# Patient Record
Sex: MALE | Race: OTHER | HISPANIC OR LATINO | Employment: FULL TIME | URBAN - METROPOLITAN AREA
[De-identification: names, ages, dates, MRNs, and addresses within clinical notes are randomized per-mention and may not be internally consistent; named-entity substitution may affect disease eponyms.]

---

## 2019-12-12 ENCOUNTER — OFFICE VISIT (OUTPATIENT)
Dept: FAMILY MEDICINE | Facility: CLINIC | Age: 44
End: 2019-12-12
Payer: COMMERCIAL

## 2019-12-12 VITALS
BODY MASS INDEX: 34.59 KG/M2 | WEIGHT: 261 LBS | OXYGEN SATURATION: 97 % | TEMPERATURE: 98 F | HEART RATE: 66 BPM | DIASTOLIC BLOOD PRESSURE: 90 MMHG | HEIGHT: 73 IN | SYSTOLIC BLOOD PRESSURE: 139 MMHG

## 2019-12-12 DIAGNOSIS — I10 ESSENTIAL HYPERTENSION: ICD-10-CM

## 2019-12-12 DIAGNOSIS — E05.90 HYPERTHYROIDISM: Primary | ICD-10-CM

## 2019-12-12 DIAGNOSIS — Z13.6 ENCOUNTER FOR LIPID SCREENING FOR CARDIOVASCULAR DISEASE: ICD-10-CM

## 2019-12-12 DIAGNOSIS — Z13.220 ENCOUNTER FOR LIPID SCREENING FOR CARDIOVASCULAR DISEASE: ICD-10-CM

## 2019-12-12 DIAGNOSIS — F32.A DEPRESSION, UNSPECIFIED DEPRESSION TYPE: ICD-10-CM

## 2019-12-12 DIAGNOSIS — K43.9 HERNIA OF ABDOMINAL WALL: ICD-10-CM

## 2019-12-12 DIAGNOSIS — M19.90 ARTHRITIS: ICD-10-CM

## 2019-12-12 DIAGNOSIS — M10.9 GOUT, UNSPECIFIED CAUSE, UNSPECIFIED CHRONICITY, UNSPECIFIED SITE: ICD-10-CM

## 2019-12-12 DIAGNOSIS — L60.0 INGROWN TOENAIL OF LEFT FOOT WITH INFECTION: ICD-10-CM

## 2019-12-12 PROCEDURE — 3008F PR BODY MASS INDEX (BMI) DOCUMENTED: ICD-10-PCS | Mod: S$GLB,,, | Performed by: NURSE PRACTITIONER

## 2019-12-12 PROCEDURE — 99203 OFFICE O/P NEW LOW 30 MIN: CPT | Mod: S$GLB,,, | Performed by: NURSE PRACTITIONER

## 2019-12-12 PROCEDURE — 99203 PR OFFICE/OUTPT VISIT, NEW, LEVL III, 30-44 MIN: ICD-10-PCS | Mod: S$GLB,,, | Performed by: NURSE PRACTITIONER

## 2019-12-12 PROCEDURE — 3008F BODY MASS INDEX DOCD: CPT | Mod: S$GLB,,, | Performed by: NURSE PRACTITIONER

## 2019-12-12 RX ORDER — LOSARTAN POTASSIUM 50 MG/1
50 TABLET ORAL DAILY
Qty: 30 TABLET | Refills: 1 | Status: SHIPPED | OUTPATIENT
Start: 2019-12-12 | End: 2020-04-13

## 2019-12-12 RX ORDER — INDOMETHACIN 50 MG/1
50 CAPSULE ORAL 2 TIMES DAILY
COMMUNITY
Start: 2016-02-24 | End: 2019-12-12 | Stop reason: SDUPTHER

## 2019-12-12 RX ORDER — INDOMETHACIN 50 MG/1
50 CAPSULE ORAL DAILY
Qty: 30 CAPSULE | Refills: 1 | Status: SHIPPED | OUTPATIENT
Start: 2019-12-12 | End: 2020-01-30 | Stop reason: SDUPTHER

## 2019-12-12 RX ORDER — FEBUXOSTAT 40 MG/1
40 TABLET, FILM COATED ORAL DAILY
Qty: 30 TABLET | Refills: 1 | Status: SHIPPED | OUTPATIENT
Start: 2019-12-12 | End: 2019-12-27

## 2019-12-12 RX ORDER — BACITRACIN 500 [USP'U]/G
OINTMENT TOPICAL 2 TIMES DAILY
Qty: 14 G | Refills: 0 | Status: SHIPPED | OUTPATIENT
Start: 2019-12-12 | End: 2020-01-30

## 2019-12-12 RX ORDER — LOSARTAN POTASSIUM 50 MG/1
50 TABLET ORAL DAILY
COMMUNITY
End: 2019-12-12 | Stop reason: SDUPTHER

## 2019-12-12 RX ORDER — ESCITALOPRAM OXALATE 10 MG/1
10 TABLET ORAL DAILY
Qty: 30 TABLET | Refills: 1 | Status: SHIPPED | OUTPATIENT
Start: 2019-12-12 | End: 2020-01-30 | Stop reason: SDUPTHER

## 2019-12-12 RX ORDER — IBUPROFEN 800 MG/1
800 TABLET ORAL EVERY 4 HOURS PRN
COMMUNITY
End: 2019-12-12 | Stop reason: SDUPTHER

## 2019-12-12 RX ORDER — METHIMAZOLE 10 MG/1
10 TABLET ORAL DAILY
Qty: 30 TABLET | Refills: 1 | Status: SHIPPED | OUTPATIENT
Start: 2019-12-12 | End: 2020-04-13

## 2019-12-12 RX ORDER — IBUPROFEN 800 MG/1
800 TABLET ORAL 3 TIMES DAILY PRN
Qty: 60 TABLET | Refills: 0 | Status: SHIPPED | OUTPATIENT
Start: 2019-12-12 | End: 2020-02-24 | Stop reason: ALTCHOICE

## 2019-12-12 RX ORDER — FEBUXOSTAT 40 MG/1
40 TABLET, FILM COATED ORAL DAILY
COMMUNITY
End: 2019-12-12 | Stop reason: SDUPTHER

## 2019-12-12 RX ORDER — SULFAMETHOXAZOLE AND TRIMETHOPRIM 800; 160 MG/1; MG/1
1 TABLET ORAL 2 TIMES DAILY
Qty: 10 TABLET | Refills: 0 | Status: SHIPPED | OUTPATIENT
Start: 2019-12-12 | End: 2019-12-17

## 2019-12-12 RX ORDER — METHIMAZOLE 10 MG/1
10 TABLET ORAL DAILY
COMMUNITY
End: 2019-12-12 | Stop reason: SDUPTHER

## 2019-12-12 NOTE — PATIENT INSTRUCTIONS
Gout Diet  Gout is a painful condition caused by an excess of uric acid, a waste product made by the body. Uric acid forms crystals that collect in the joints. The immune response to these crystals brings on symptoms of joint pain and swelling. This is called a gout attack. Often, medications and diet changes are combined to manage gout. Below are some guidelines for changing your diet to help you manage gout and prevent attacks. Your health care provider will help you determine the best eating plan for you.     Eating to manage gout  Weight loss for those who are overweight may help reduce gout attacks.  Eat less of these foods  Eating too many foods containing purines may raise the levels of uric acid in your body. This raises your risk for a gout attack. Try to limit these foods and drinks:  · Alcohol, such as beer and red wine. You may be told to avoid alcohol completely.  · Soft drinks that contain sugar or high fructose corn syrup  · Certain fish, including anchovies, sardines, fish eggs, and herring  · Shellfish  · Certain meats, such as red meat, hot dogs, luncheon meats, and turkey  · Organ meats, such as liver, kidneys, and sweetbreads  · Legumes, such as dried beans and peas  · Other high fat foods such as gravy, whole milk, and high fat cheeses  · Vegetables such as asparagus, cauliflower, spinach, and mushrooms used to be thought to contribute to an increased risk for a gout attack, but recent studies show that high purine vegetables don't increase the risk for a gout attack.  Eat more of these foods  Other foods may be helpful for people with gout. Add some of these foods to your diet:  · Cherries contain chemicals that may lower uric acid.  · Omega fatty acids. These are found in some fatty fish such as salmon, certain oils (flax, olive, or nut), and nuts themselves. Omega fatty acids may help prevent inflammation due to gout.  · Dairy products that are low-fat or fat-free, such as cheese and  yogurt  · Complex carbohydrate foods, including whole grains, brown rice, oats, and beans  · Coffee, in moderation  · Water, approximately 64 ounces per day  Follow-up care  Follow up with your healthcare provider as advised.  When to seek medical advice  Call your healthcare provider right away if any of these occur:  · Return of gout symptoms, usually at night:  · Severe pain, swelling, and heat in a joint, especially the base of the big toe  · Affected joint is hard to move  · Skin of the affected joint is purple or red  · Fever of 100.4°F (38°C) or higher  · Pain that doesn't get better even with prescribed medicine   Date Last Reviewed: 1/12/2016  © 4114-8761 [employer]. [address] [place] [address], [place] [address]. All rights reserved. This information is not intended as a substitute for professional medical care. Always follow your healthcare professional's instructions.      Depression  Depression is one of the most common mental health problems today. It is not just a state of unhappiness or sadness. It is a true disease. The cause seems to be related to a decrease in chemicals that transmit signals in the brain. Having a family history of depression, alcoholism, or suicide increases the risk. Chronic illness, chronic pain, migraine headaches and high emotional stress also increase the risk.  Depression is something we tend to recognize in others, but may have a hard time seeing in ourselves. It can show in many physical and emotional ways:  · Loss of appetite  · Over-eating  · Not being able to sleep  · Sleeping too much  · Tiredness not related to physical exertion  · Restlessness or irritability  · Slowness of movement or speech  · Feeling depressed or withdrawn  · Loss of interest in things you once enjoyed  · Trouble concentrating, poor memory, trouble making decisions  · Thoughts of harming or killing oneself, or thoughts that life is not worth living  · Low self-esteem  The treatment for  depression may include both medicine and psychotherapy. Antidepressants can reduce suffering and can improve the ability to function during the depressed period. Therapy can offer emotional support and help you understand emotional factors that may be causing the depression.  Home care  · On-going care and support helps people manage this disease.  Find a healthcare provider and therapist who meet your needs. Seek help when you feel like you may be getting ill.  · Be kind to yourself. Make it a point to do things that you enjoy (gardening, walking in nature, going to a movie, etc.). Reward yourself for small successes.  · Take care of your physical body. Eat a balanced diet (low in saturated fat and high in fruits and vegetables). Exercise at least 3 times a week for 30 minutes. Even mild-moderate exercise (like brisk walking) can make you feel better.  · Avoid alcohol, which can make depression worse.  · Take medicine as prescribed.  · Tell each of your healthcare providers about all of the prescription drugs, over-the-counter medicines, vitamins, and supplements you take. Certain supplements interact with medicines and can result in dangerous side effects. Ask your pharmacist when you have questions about drug interactions.  · Talk with your family and trusted friends about your feelings and thoughts. Ask them to help you recognize behavior changes early so you can get help and, if needed, medicine can be adjusted.  Follow-up care  Follow up with your healthcare provider, or as advised.  Call 911  Call 911 if you:  · Have suicidal thoughts, a suicide plan, and the means to carry out the plan  · Have trouble breathing  · Are very confused  · Feel very drowsy or have trouble awakening  · Faint or lose consciousness  · Have new chest pain that becomes more severe, lasts longer, or spreads into your shoulder, arm, neck, jaw or back  When to seek medical advice  Call your healthcare provider right away if any of these  occur:  · Feeling extreme depression, fear, anxiety, or anger toward yourself or others  · Feeling out of control  · Feeling that you may try to harm yourself or another  · Hearing voices that others do not hear  · Seeing things that others do not see  · Cant sleep or eat for 3 days in a row  · Friends or family express concern over your behavior and ask you to seek help  Date Last Reviewed: 9/29/2015  © 9950-2502 [employer]. [address] [place] [address], [place] [address]. All rights reserved. This information is not intended as a substitute for professional medical care. Always follow your healthcare professional's instructions.

## 2019-12-12 NOTE — PROGRESS NOTES
SUBJECTIVE:      Patient ID: Jose Fox is a 44 y.o. male.    Chief Complaint: Establish Care    Pt presents as a new patient to establish care with the practice but wants to see Dr. Duran. He has a PMH of hypertension, gout, arthritis, and hyperthyroidism. He has not seen a doctor since moving to the area in February of this year. He has a history of gout but isn't compliant with a gout diet. He is a  at the [employer]. He ran out of his blood pressure medicine so didn't take it this morning. He used to follow with an endocrinologist with his history of hyperthyroidism but hasn't been established with an endocrinologist since moving here. Reports ingrown toenail which he pulled away from the skin on his own and has been using Dr. Guillaume's ingrown toenail relief cream on it. Reports a lump on his abdomen which is sometimes slightly tender and feels it protrude more in different positions. He also reports some depression since moving to the area in February. Reports in the past he has been treated for this successfully with Lexapro daily but he stopped a couple years back. Otherwise doing well with no complaints. Denies abdominal pain, suicidal ideations or plan, CP, SOB, wheezing, nausea, vomiting, dizziness, numbness, weakness, palpitations, fevers, or any other concerns at this time.   Depression   Visit Type: initial  Onset of symptoms: 1 to 6 months ago  Progression since onset: unchanged  Patient presents with the following symptoms: anhedonia, depressed mood and malaise.  Patient is not experiencing: chest pain, choking sensation, compulsions, confusion, decreased concentration, dizziness, dry mouth, excessive worry, fatigue, feelings of hopelessness, feelings of worthlessness, hypersomnia, hyperventilation, impotence, insomnia, irritability, memory impairment, muscle tension, nausea, nervousness/anxiety, obsessions, palpitations, panic, psychomotor agitation, psychomotor retardation,  restlessness, shortness of breath, suicidal ideas, suicidal planning, thoughts of death, weight gain and weight loss.  Frequency of symptoms: most days   Severity: moderate   Exacerbated by: move to the area.  Risk factors: major life event  Patient has a history of: depression and hyperthyroidism  No history of: anemia, anxiety/panic attacks, arrhythmia, asthma, bipolar disorder, CAD, CHF, chronic lung disease, fibromyalgia, suicide attempt, mental illness and substance abuse  Treatment tried: SSRI      Foot Injury    The incident occurred at home. There was no injury mechanism. Pain location: L great toe. Pertinent negatives include no inability to bear weight, loss of motion, loss of sensation, muscle weakness, numbness or tingling. He reports no foreign bodies present. The symptoms are aggravated by palpation. Treatments tried: Dr. Guillaume's ingrown toenail cream. The treatment provided no relief.       Past Surgical History:   Procedure Laterality Date    LEG SURGERY       Family History   Problem Relation Age of Onset    Hypertension Mother     Hypertension Father     Diabetes Father       Social History     Socioeconomic History    Marital status: Single     Spouse name: Not on file    Number of children: Not on file    Years of education: Not on file    Highest education level: Not on file   Occupational History    Not on file   Social Needs    Financial resource strain: Not on file    Food insecurity:     Worry: Not on file     Inability: Not on file    Transportation needs:     Medical: Not on file     Non-medical: Not on file   Tobacco Use    Smoking status: Never Smoker    Smokeless tobacco: Never Used   Substance and Sexual Activity    Alcohol use: Never     Frequency: Never    Drug use: Never    Sexual activity: Not on file   Lifestyle    Physical activity:     Days per week: Not on file     Minutes per session: Not on file    Stress: Only a little   Relationships    Social  connections:     Talks on phone: Not on file     Gets together: Not on file     Attends Bahai service: Not on file     Active member of club or organization: Not on file     Attends meetings of clubs or organizations: Not on file     Relationship status: Not on file   Other Topics Concern    Not on file   Social History Narrative    Not on file     Current Outpatient Medications   Medication Sig Dispense Refill    febuxostat (ULORIC) 40 mg Tab Take 1 tablet (40 mg total) by mouth once daily. 30 tablet 1    ibuprofen (ADVIL,MOTRIN) 800 MG tablet Take 1 tablet (800 mg total) by mouth 3 (three) times daily as needed for Pain. With meals 60 tablet 0    indomethacin (INDOCIN) 50 MG capsule Take 1 capsule (50 mg total) by mouth once daily. With meal 30 capsule 1    losartan (COZAAR) 50 MG tablet Take 1 tablet (50 mg total) by mouth once daily. 30 tablet 1    methIMAzole (TAPAZOLE) 10 MG Tab Take 1 tablet (10 mg total) by mouth once daily. 30 tablet 1    bacitracin 500 unit/gram ointment Apply topically 2 (two) times daily. 14 g 0    escitalopram oxalate (LEXAPRO) 10 MG tablet Take 1 tablet (10 mg total) by mouth once daily. 30 tablet 1    sulfamethoxazole-trimethoprim 800-160mg (BACTRIM DS) 800-160 mg Tab Take 1 tablet by mouth 2 (two) times daily. for 5 days 10 tablet 0     No current facility-administered medications for this visit.      Review of patient's allergies indicates:  No Known Allergies   Past Medical History:   Diagnosis Date    Gout     Hypertension     Thyroid disease      Past Surgical History:   Procedure Laterality Date    LEG SURGERY         Review of Systems   Constitutional: Negative for activity change, appetite change, chills, fatigue, fever, irritability, unexpected weight change, weight gain and weight loss.   HENT: Negative for congestion, ear pain, mouth sores, nosebleeds, rhinorrhea, sinus pressure, sore throat and trouble swallowing.    Eyes: Negative for pain and visual  "disturbance.   Respiratory: Negative for apnea, cough, choking, chest tightness, shortness of breath, wheezing and stridor.    Cardiovascular: Negative for chest pain, palpitations and leg swelling.   Gastrointestinal: Negative for abdominal pain, anal bleeding, blood in stool, constipation, diarrhea, nausea and vomiting.        Lump on abdomen   Genitourinary: Negative for dysuria, flank pain, frequency, hematuria and impotence.   Musculoskeletal: Negative for arthralgias, myalgias and neck stiffness.   Skin: Positive for wound. Negative for color change and rash.   Neurological: Negative for dizziness, tingling, tremors, seizures, syncope, numbness and headaches.   Hematological: Negative for adenopathy.   Psychiatric/Behavioral: Positive for depression. Negative for confusion, decreased concentration, self-injury, sleep disturbance, substance abuse and suicidal ideas. The patient is not nervous/anxious and does not have insomnia.         Depression      OBJECTIVE:      Vitals:    12/12/19 0835 12/12/19 0937   BP: (!) 138/92 (!) 139/90   BP Location:  Left arm   Patient Position:  Sitting   Pulse: 66    Temp: 97.5 °F (36.4 °C)    SpO2: 97%    Weight: 118.4 kg (261 lb)    Height: 6' 1" (1.854 m)      Physical Exam   Constitutional: He is oriented to person, place, and time. He appears well-developed and well-nourished. No distress.   HENT:   Head: Normocephalic and atraumatic.   Right Ear: Hearing, tympanic membrane, external ear and ear canal normal.   Left Ear: Hearing, tympanic membrane, external ear and ear canal normal.   Nose: Nose normal. No mucosal edema or rhinorrhea.   Mouth/Throat: Uvula is midline, oropharynx is clear and moist and mucous membranes are normal. No oropharyngeal exudate, posterior oropharyngeal edema or posterior oropharyngeal erythema.   Eyes: Pupils are equal, round, and reactive to light. Conjunctivae, EOM and lids are normal. Right eye exhibits no discharge. Left eye exhibits no " discharge. No scleral icterus.   Neck: Trachea normal, normal range of motion, full passive range of motion without pain and phonation normal. Neck supple. No tracheal deviation present. No thyromegaly present.   Cardiovascular: Normal rate, regular rhythm, normal heart sounds, intact distal pulses and normal pulses. Exam reveals no gallop and no friction rub.   No murmur heard.  Pulmonary/Chest: Effort normal and breath sounds normal. No stridor. No respiratory distress. He has no decreased breath sounds. He has no wheezes. He has no rhonchi. He has no rales.   Abdominal: Soft. Normal appearance and bowel sounds are normal. He exhibits no distension and no mass. There is no hepatosplenomegaly. There is no tenderness. There is no rigidity and no guarding. A hernia is present.       Musculoskeletal: Normal range of motion. He exhibits no edema.   Feet:   Left Foot:   Skin Integrity: Positive for erythema and warmth.   Lymphadenopathy:     He has no cervical adenopathy.        Right: No supraclavicular adenopathy present.        Left: No supraclavicular adenopathy present.   Neurological: He is alert and oriented to person, place, and time.   Skin: Skin is warm, dry and intact. Capillary refill takes less than 2 seconds. No rash noted. He is not diaphoretic.   Warmth, redness and drainage noted to great L toe, open areas noted where pt pulled the toenail from the skin   Psychiatric: He has a normal mood and affect. His speech is normal and behavior is normal. Judgment and thought content normal. Cognition and memory are normal. He expresses no suicidal plans.   Vitals reviewed.     Assessment:       1. Hyperthyroidism    2. Essential hypertension    3. Ingrown toenail of left foot with infection    4. Depression, unspecified depression type    5. Gout, unspecified cause, unspecified chronicity, unspecified site    6. Arthritis    7. Encounter for lipid screening for cardiovascular disease    8. Hernia of abdominal  wall        Plan:       Hyperthyroidism  Will send to endocrinology for further follow up since he was seeing endocrinology prior to moving here. Checking thyroid panel for levels. Refills given for methimazole as he was previously taking.  -     Ambulatory referral/consult to Endocrinology; Future; Expected date: 12/12/2019  -     TSH; Future; Expected date: 12/12/2019  -     T4, free; Future; Expected date: 12/12/2019  -     T3, free; Future; Expected date: 12/12/2019  -     methIMAzole (TAPAZOLE) 10 MG Tab; Take 1 tablet (10 mg total) by mouth once daily.  Dispense: 30 tablet; Refill: 1    Essential hypertension  BP is borderline at this time, however, pt reports he did not take his medication this morning yet. Will continue him on losartan as previously prescribed. Checking labs. Will call with results. F/U with Dr. Duran in 4 weeks to assess BP control with compliance.  -     CBC auto differential; Future; Expected date: 12/12/2019  -     losartan (COZAAR) 50 MG tablet; Take 1 tablet (50 mg total) by mouth once daily.  Dispense: 30 tablet; Refill: 1    Ingrown toenail of left foot with infection  Referring to podiatry for further treatment. Treating with Bactrim x 5 days for purulent skin infection. Bacitracin twice daily with instructions to keep covered when in shoes or socks. Can remove bandage overnight. F/U if symptoms worsen or don't improve.  -     Ambulatory referral to Podiatry  -     bacitracin 500 unit/gram ointment; Apply topically 2 (two) times daily.  Dispense: 14 g; Refill: 0  -     sulfamethoxazole-trimethoprim 800-160mg (BACTRIM DS) 800-160 mg Tab; Take 1 tablet by mouth 2 (two) times daily. for 5 days  Dispense: 10 tablet; Refill: 0    Depression, unspecified depression type  Starting on 10mg Lexapro to initiate dosage again and assess for control of depression. F/U in 4 weeks with Dr. Duran.  -     escitalopram oxalate (LEXAPRO) 10 MG tablet; Take 1 tablet (10 mg total) by mouth once daily.   Dispense: 30 tablet; Refill: 1    Gout, unspecified cause, unspecified chronicity, unspecified site  Checking labs and will call with results. Refilling medications as previously taken. Reports he was started on Uloric because allopurinol wasn't effective for him. Instructed to follow gout diet.  -     Comprehensive metabolic panel; Future; Expected date: 12/12/2019  -     Uric acid; Future; Expected date: 12/12/2019  -     febuxostat (ULORIC) 40 mg Tab; Take 1 tablet (40 mg total) by mouth once daily.  Dispense: 30 tablet; Refill: 1  -     indomethacin (INDOCIN) 50 MG capsule; Take 1 capsule (50 mg total) by mouth once daily. With meal  Dispense: 30 capsule; Refill: 1    Arthritis  Refilled his ibuprofen which he takes for arthritic type pain. Instructed he should not be taking the indomethacin and ibuprofen at the same time. Verbalized understanding.  -     ibuprofen (ADVIL,MOTRIN) 800 MG tablet; Take 1 tablet (800 mg total) by mouth 3 (three) times daily as needed for Pain. With meals  Dispense: 60 tablet; Refill: 0    Encounter for lipid screening for cardiovascular disease  -     Lipid panel; Future; Expected date: 12/12/2019    Hernia of abdominal wall  Pt has a hernia of his abdominal wall just superior to his umbilicus. Sending referral to general surgery for their recommendations.  -     Ambulatory Referral to General Surgery        Follow up in about 4 weeks (around 1/9/2020) for F/U depression with Dr. Duran.      12/12/2019 Kennedi Horn, LEENA, FNP

## 2019-12-23 ENCOUNTER — TELEPHONE (OUTPATIENT)
Dept: FAMILY MEDICINE | Facility: CLINIC | Age: 44
End: 2019-12-23

## 2019-12-23 NOTE — TELEPHONE ENCOUNTER
----- Message from Miriam Bey, Patient Care Assistant sent at 12/23/2019 10:26 AM CST -----  Regarding: Referral Order  Contact: [phone]  Referral: for pt to see Dr. Rouse    Status: unable to contact pt    We attempted to call this patient four times to schedule an appointment for this referral to no avail. Please be advised, we will be removing this from our Queue.    Thank You,  Miriam

## 2019-12-27 ENCOUNTER — OFFICE VISIT (OUTPATIENT)
Dept: FAMILY MEDICINE | Facility: CLINIC | Age: 44
End: 2019-12-27
Payer: COMMERCIAL

## 2019-12-27 VITALS
WEIGHT: 258.88 LBS | SYSTOLIC BLOOD PRESSURE: 134 MMHG | DIASTOLIC BLOOD PRESSURE: 72 MMHG | HEIGHT: 73 IN | OXYGEN SATURATION: 98 % | BODY MASS INDEX: 34.31 KG/M2 | HEART RATE: 78 BPM | TEMPERATURE: 98 F

## 2019-12-27 DIAGNOSIS — M62.830 SPASM OF LUMBAR PARASPINOUS MUSCLE: ICD-10-CM

## 2019-12-27 DIAGNOSIS — M54.41 ACUTE RIGHT-SIDED LOW BACK PAIN WITH RIGHT-SIDED SCIATICA: Primary | ICD-10-CM

## 2019-12-27 PROCEDURE — 96372 THER/PROPH/DIAG INJ SC/IM: CPT | Mod: S$GLB,,, | Performed by: NURSE PRACTITIONER

## 2019-12-27 PROCEDURE — 3008F BODY MASS INDEX DOCD: CPT | Mod: S$GLB,,, | Performed by: NURSE PRACTITIONER

## 2019-12-27 PROCEDURE — 3008F PR BODY MASS INDEX (BMI) DOCUMENTED: ICD-10-PCS | Mod: S$GLB,,, | Performed by: NURSE PRACTITIONER

## 2019-12-27 PROCEDURE — 96372 PR INJECTION,THERAP/PROPH/DIAG2ST, IM OR SUBCUT: ICD-10-PCS | Mod: S$GLB,,, | Performed by: NURSE PRACTITIONER

## 2019-12-27 PROCEDURE — 99213 OFFICE O/P EST LOW 20 MIN: CPT | Mod: 25,S$GLB,, | Performed by: NURSE PRACTITIONER

## 2019-12-27 PROCEDURE — 99213 PR OFFICE/OUTPT VISIT, EST, LEVL III, 20-29 MIN: ICD-10-PCS | Mod: 25,S$GLB,, | Performed by: NURSE PRACTITIONER

## 2019-12-27 RX ORDER — METHYLPREDNISOLONE SODIUM SUCCINATE 125 MG/2ML
125 INJECTION INTRAMUSCULAR; INTRAVENOUS ONCE
Status: COMPLETED | OUTPATIENT
Start: 2019-12-27 | End: 2019-12-27

## 2019-12-27 RX ORDER — CYCLOBENZAPRINE HCL 10 MG
10 TABLET ORAL 3 TIMES DAILY PRN
Qty: 30 TABLET | Refills: 0 | Status: SHIPPED | OUTPATIENT
Start: 2019-12-27 | End: 2020-01-06

## 2019-12-27 RX ADMIN — METHYLPREDNISOLONE SODIUM SUCCINATE 125 MG: 125 INJECTION INTRAMUSCULAR; INTRAVENOUS at 01:12

## 2019-12-27 NOTE — PATIENT INSTRUCTIONS
Relieving Back Pain  Back pain is a common problem. You can strain back muscles by lifting too much weight or just by moving the wrong way. Back strain can be uncomfortable, even painful. And it can take weeks or months to improve. To help yourself feel better and prevent future back strains, try these tips.  Important Note: Do not give aspirin to children or teens without first discussing it with your healthcare provider.      ? Ice    Ice reduces muscle pain and swelling. It helps most during the first 24 to 48 hours after an injury.  · Wrap an ice pack or a bag of frozen peas in a thin towel. (Never place ice directly on your skin.)  · Place the ice where your back hurts the most.  · Dont ice for more than 20 minutes at a time.  · You can use ice several times a day.  ? Medicines  Over-the-counter pain relievers can include acetaminophen and anti-inflammatory medicines, which includes aspirin or ibuprofen. They can help ease discomfort. Some also reduce swelling.  · Tell your healthcare provider about any medicines you are already taking.  · Take medicines only as directed.  ? Heat  After the first 48 hours, heat can relax sore muscles and improve blood flow.  · Try a warm bath or shower. Or use a heating pad set on low. To prevent a burn, keep a cloth between you and the heating pad.  · Dont use a heating pad for more than 15 minutes at a time. Never sleep on a heating pad.  Date Last Reviewed: 9/1/2015  © 2693-0030 [employer]. [address] [place] [address]. All rights reserved. This information is not intended as a substitute for professional medical care. Always follow your healthcare professional's instructions.        Back Spasm (No Trauma)    Spasm of the back muscles can occur after a sudden forceful twisting or bending force (such as in a car accident), after a simple awkward movement, or after lifting something heavy with poor body positioning. In any case, muscle spasm  adds to the pain. Sleeping in an awkward position or on a poor quality mattress can also cause this. Some people respond to emotional stress by tensing the muscles of their back.  Pain that continues may need further evaluation or other types of treatment such as physical therapy.  You don't always need X-rays for the initial evaluation of back pain, unless you had a physical injury such as from a car accident or fall. If your pain continues and doesn't respond to medical treatment, X-rays and other tests may then be done.   Home care  · As soon as possible, start sitting or walking again to avoid problems from prolonged bed rest (muscle weakness, worsening back stiffness and pain, blood clots in the legs).  · When in bed, try to find a position of comfort. A firm mattress is best. Try lying flat on your back with pillows under your knees. You can also try lying on your side with your knees bent up toward your chest and a pillow between your knees.  · Avoid prolonged sitting, long car rides, or travel. This puts more stress on the lower back than standing or walking.   · During the first 24 to 72 hours after an injury or flare-up, apply an ice pack to the painful area for 20 minutes, then remove it for 20 minutes. Do this over a period of 60 to 90 minutes or several times a day. This will reduce swelling and pain. Always wrap ice packs in a thin towel.  · You can start with ice, then switch to heat. Heat (hot shower, hot bath, or heating pad) reduces pain, and works well for muscle spasms. Apply heat to the painful area for 20 minutes, then remove it for 20 minutes. Do this over a period of 60 to 90 minutes or several times a day. Do not sleep on a heating pad as it can burn or damage skin.  · Alternate ice and heat therapies.  · Be aware of safe lifting methods and do not lift anything over 15 pounds until all the pain is gone.  Gentle stretching will help your back heal faster. Do this simple routine 2 to 3 times  a day until your back is feeling better.  · Lie on your back with your knees bent and both feet on the ground  · Slowly raise your left knee to your chest as you flatten your lower back against the floor. Hold for 20 to 30 seconds.  · Relax and repeat the exercise with your right knee.  · Do 2 to 3 of these exercises for each leg.  · Repeat, hugging both knees to your chest at the same time.  · Do not bounce, but use a gentle pull.  Medicines  Talk to your doctor before using medicine, especially if you have other medical problems or are taking other medicines.  You may use acetaminophen or ibuprofen to control pain, unless your healthcare provider prescribed another pain medicine. If you have a chronic condition such as diabetes, liver or kidney disease, stomach ulcer, or gastrointestinal bleeding, or are taking blood thinners, talk with your healthcare provider before taking any medicines.  Be careful if you are given prescription pain medicine, narcotics, or medicine for muscle spasm. They can cause drowsiness, affect your coordination, reflexes, or judgment. Do not drive or operate heavy machinery when taking these medicines. Take pain medicine only as prescribed by your healthcare provider.  Follow-up care  Follow up with your doctor, or as advised. Physical therapy or further tests may be needed.  If X-rays were taken, they may be reviewed by a radiologist. You will be notified of any new findings that may affect your care.  Call 911  Seek emergency medical care if any of these occur:  · Trouble breathing  · Confusion  · Drowsiness or trouble awakening  · Fainting or loss of consciousness  · Rapid or very slow heart rate  · Loss of bowel or bladder control  When to seek medical advice  Call your healthcare provider right away if any of these occur:  · Pain becomes worse or spreads to your legs  · Weakness or numbness in one or both legs  · Numbness in the groin or genital area  · Unexplained fever over 100.4ºF  (38.0ºC)  · Burning or pain when passing urine  Date Last Reviewed: 6/1/2016  © 1679-0857 [employer]. [address] [place] [address], [place] [address]. All rights reserved. This information is not intended as a substitute for professional medical care. Always follow your healthcare professional's instructions.        Exercises to Strengthen Your Lower Back  Strong lower back and abdominal muscles work together to support your spine. The exercises below will help strengthen the lower back. It is important that you begin exercising slowly and increase levels gradually.  Always begin any exercise program with stretching. If you feel pain while doing any of these exercises, stop and talk to your doctor about a more specific exercise program that better suits your condition.   Low back stretch  The point of stretching is to make you more flexible and increase your range of motion. Stretch only as much as you are able. Stretch slowly. Do not push your stretch to the limit. If at any point you feel pain while stretching, this is your (temporary) limit.  · Lie on your back with your knees bent and both feet on the ground.  · Slowly raise your left knee to your chest as you flatten your lower back against the floor. Hold for 5 seconds.  · Relax and repeat the exercise with your right knee.  · Do 10 of these exercises for each leg.  · Repeat hugging both knees to your chest at the same time.  Building lower back strength  Start your exercise routine with 10 to 30 minutes a day, 1 to 3 times a day.  Initial exercises  Lying on your back:  1. Ankle pumps: Move your foot up and down, towards your head, and then away. Repeat 10 times with each foot.  2. Heel slides: Slowly bend your knee, drawing the heel of your foot towards you. Then slide your heel/foot from you, straightening your knee. Do not lift your foot off the floor (this is not a leg lift).  3. Abdominal contraction: Bend your knees and put your hands on your  stomach. Tighten your stomach muscles. Hold for 5 seconds, then relax. Repeat 10 times.  4. Straight leg raise: Bend one leg at the knee and keep the other leg straight. Tighten your stomach muscles. Slowly lift your straight leg 6 to 12 inches off the floor and hold for up to 5 seconds. Repeat 10 times on each side.  Standin. Wall squats: Stand with your back against the wall. Move your feet about 12 inches away from the wall. Tighten your stomach muscles, and slowly bend your knees until they are at about a 45 degree angle. Do not go down too far. Hold about 5 seconds. Then slowly return to your starting position. Repeat 10 times.  2. Heel raises: Stand facing the wall. Slowly raise the heels of your feet up and down, while keeping your toes on the floor. If you have trouble balancing, you can touch the wall with your hands. Repeat 10 times.  More advanced exercises  When you feel comfortable enough, try these exercises.  1. Kneeling lumbar extension: Begin on your hands and knees. At the same time, raise and straighten your right arm and left leg until they are parallel to the ground. Hold for 2 seconds and come back slowly to a starting position. Repeat with left arm and right leg, alternating 10 times.  2. Prone lumbar extension: Lie face down, arms extended overhead, palms on the floor. At the same time, raise your right arm and left leg as high as comfortably possible. Hold for 10 seconds and slowly return to start. Repeat with left arm and right leg, alternating 10 times. Gradually build up to 20 times. (Advanced: Repeat this exercise raising both arms and both legs a few inches off the floor at the same time. Hold for 5 seconds and release.)  3. Pelvic tilt: Lie on the floor on your back with your knees bent at 90 degrees. Your feet should be flat on the floor. Inhale, exhale, then slowly contract your abdominal muscles bringing your navel toward your spine. Let your pelvis rock back until your lower  back is flat on the floor. Hold for 10 seconds while breathing smoothly.  4. Abdominal crunch: Perform a pelvic tilt (above) flattening your lower back against the floor. Holding the tension in your abdominal muscles, take another breath and raise your shoulder blades off the ground (this is not a full sit-up). Keep your head in line with your body (dont bend your neck forward). Hold for 2 seconds, then slowly lower.  Date Last Reviewed: 6/1/2016  © 3882-4724 [employer]. [address] [place] [address]. All rights reserved. This information is not intended as a substitute for professional medical care. Always follow your healthcare professional's instructions.

## 2019-12-27 NOTE — PROGRESS NOTES
SUBJECTIVE:      Patient ID: Jose Fox is a 44 y.o. male.    Chief Complaint: Spasms (right lower back x 2 weeks, no known injury)    Established patient here for c/o lower back spasms and sharp pain over the last few weeks. He denies any trauma, falls or injury preceding the pain. He has been taking NSAIDS prn with some improvement.     Back Pain   This is a new problem. The current episode started 1 to 4 weeks ago. The problem occurs intermittently. The problem has been waxing and waning since onset. The pain is present in the lumbar spine (right > left ). The quality of the pain is described as cramping and stabbing. The pain does not radiate. The pain is at a severity of 8/10. The pain is worse during the night. The symptoms are aggravated by bending, standing and twisting. Stiffness is present in the morning. Pertinent negatives include no abdominal pain, bladder incontinence, bowel incontinence, chest pain, dysuria, fever, headaches, leg pain, numbness, paresthesias, pelvic pain, tingling or weakness. Risk factors include lack of exercise and obesity. He has tried NSAIDs (stretching ) for the symptoms. The treatment provided mild relief.       Family History   Problem Relation Age of Onset    Hypertension Mother     Hypertension Father     Diabetes Father       Social History     Socioeconomic History    Marital status: Single     Spouse name: Not on file    Number of children: Not on file    Years of education: Not on file    Highest education level: Not on file   Occupational History    Not on file   Social Needs    Financial resource strain: Not on file    Food insecurity:     Worry: Not on file     Inability: Not on file    Transportation needs:     Medical: Not on file     Non-medical: Not on file   Tobacco Use    Smoking status: Never Smoker    Smokeless tobacco: Never Used   Substance and Sexual Activity    Alcohol use: Never     Frequency: Never    Drug use: Never    Sexual  activity: Not on file   Lifestyle    Physical activity:     Days per week: Not on file     Minutes per session: Not on file    Stress: Only a little   Relationships    Social connections:     Talks on phone: Not on file     Gets together: Not on file     Attends Rastafari service: Not on file     Active member of club or organization: Not on file     Attends meetings of clubs or organizations: Not on file     Relationship status: Not on file   Other Topics Concern    Not on file   Social History Narrative    Not on file     Current Outpatient Medications   Medication Sig Dispense Refill    bacitracin 500 unit/gram ointment Apply topically 2 (two) times daily. 14 g 0    escitalopram oxalate (LEXAPRO) 10 MG tablet Take 1 tablet (10 mg total) by mouth once daily. 30 tablet 1    ibuprofen (ADVIL,MOTRIN) 800 MG tablet Take 1 tablet (800 mg total) by mouth 3 (three) times daily as needed for Pain. With meals 60 tablet 0    indomethacin (INDOCIN) 50 MG capsule Take 1 capsule (50 mg total) by mouth once daily. With meal 30 capsule 1    losartan (COZAAR) 50 MG tablet Take 1 tablet (50 mg total) by mouth once daily. 30 tablet 1    methIMAzole (TAPAZOLE) 10 MG Tab Take 1 tablet (10 mg total) by mouth once daily. 30 tablet 1    cyclobenzaprine (FLEXERIL) 10 MG tablet Take 1 tablet (10 mg total) by mouth 3 (three) times daily as needed for Muscle spasms. 30 tablet 0     No current facility-administered medications for this visit.      Review of patient's allergies indicates:  No Known Allergies   Past Medical History:   Diagnosis Date    Gout     Hypertension     Thyroid disease      Past Surgical History:   Procedure Laterality Date    LEG SURGERY         Review of Systems   Constitutional: Negative for appetite change, chills, fever and unexpected weight change.   HENT: Negative for congestion and sore throat.    Respiratory: Negative for shortness of breath.    Cardiovascular: Negative for chest pain and leg  "swelling.   Gastrointestinal: Negative for abdominal pain, blood in stool, bowel incontinence, diarrhea, nausea and vomiting.   Genitourinary: Negative for bladder incontinence, dysuria, flank pain, frequency, hematuria, pelvic pain and urgency.   Musculoskeletal: Positive for back pain. Negative for arthralgias and myalgias.   Skin: Negative for rash and wound.   Neurological: Negative for tingling, weakness, numbness, headaches and paresthesias.      OBJECTIVE:      Vitals:    12/27/19 1328   BP: 134/72   BP Location: Left arm   Patient Position: Sitting   BP Method: X-Large (Manual)   Pulse: 78   Temp: 98.2 °F (36.8 °C)   TempSrc: Oral   SpO2: 98%   Weight: 117.4 kg (258 lb 14.4 oz)   Height: 6' 1" (1.854 m)     Physical Exam   Constitutional: He is oriented to person, place, and time. He appears well-developed and well-nourished. No distress.   HENT:   Head: Normocephalic.   Mouth/Throat: Oropharynx is clear and moist. No oropharyngeal exudate.   Eyes: Pupils are equal, round, and reactive to light.   Cardiovascular: Normal rate, regular rhythm and normal heart sounds.   No murmur heard.  Pulmonary/Chest: Effort normal and breath sounds normal. He has no wheezes. He has no rales.   Abdominal: Soft. Bowel sounds are normal.   Musculoskeletal:        Lumbar back: He exhibits decreased range of motion (with extension/lat rotation at mid range ) and spasm (right lower lumbar paraspinal ). He exhibits no tenderness, no swelling, no edema, no deformity, no laceration and no pain.   Mild Pos SLR right    Neurological: He is alert and oriented to person, place, and time. He has normal strength. No cranial nerve deficit or sensory deficit. He exhibits normal muscle tone. Coordination normal.   Reflex Scores:       Patellar reflexes are 1+ on the right side and 1+ on the left side.       Achilles reflexes are 1+ on the right side and 1+ on the left side.  Skin: Skin is warm and dry. No rash noted.   Psychiatric: He has a " normal mood and affect. His behavior is normal.   Nursing note and vitals reviewed.     Assessment:       1. Acute right-sided low back pain with right-sided sciatica    2. Spasm of lumbar paraspinous muscle        Plan:       Acute right-sided low back pain with right-sided sciatica  -     methylPREDNISolone sodium succinate injection 125 mg    Spasm of lumbar paraspinous muscle  -     cyclobenzaprine (FLEXERIL) 10 MG tablet; Take 1 tablet (10 mg total) by mouth 3 (three) times daily as needed for Muscle spasms.  Dispense: 30 tablet; Refill: 0    *will tx of IM steroids and continue NSAIDS and prn muscle relaxers sent today; he refuses PT referral today- will do stretching, ice and heat and if pain persists, we will send to PT     Follow up if symptoms worsen or fail to improve.      12/27/2019 LEENA Watson, FNP

## 2019-12-27 NOTE — LETTER
December 27, 2019      [facility]  [address]  [facility] [address]  Phone: [phone]  Fax: [phone]       Patient: Jose Fox   YOB: 1975  Date of Visit: 12/27/2019    To Whom It May Concern:    Sanjeev Fox  was at [facility] on 12/27/2019. He may return to work/school on 12/27/19 with light duty and no heavy lifting x 2 weeks. If you have any questions or concerns, or if I can be of further assistance, please do not hesitate to contact me.    Sincerely,    VIOLET Beaulieu

## 2019-12-28 LAB
ALBUMIN SERPL-MCNC: 4.5 G/DL (ref 3.5–5.5)
ALBUMIN/GLOB SERPL: 1.5 {RATIO} (ref 1.2–2.2)
ALP SERPL-CCNC: 77 IU/L (ref 39–117)
ALT SERPL-CCNC: 26 IU/L (ref 0–44)
AST SERPL-CCNC: 25 IU/L (ref 0–40)
BASOPHILS # BLD AUTO: 0.1 X10E3/UL (ref 0–0.2)
BASOPHILS NFR BLD AUTO: 1 %
BILIRUB SERPL-MCNC: 0.9 MG/DL (ref 0–1.2)
BUN SERPL-MCNC: 15 MG/DL (ref 6–24)
BUN/CREAT SERPL: 12 (ref 9–20)
CALCIUM SERPL-MCNC: 9.4 MG/DL (ref 8.7–10.2)
CHLORIDE SERPL-SCNC: 105 MMOL/L (ref 96–106)
CHOLEST SERPL-MCNC: 174 MG/DL (ref 100–199)
CO2 SERPL-SCNC: 23 MMOL/L (ref 20–29)
CREAT SERPL-MCNC: 1.3 MG/DL (ref 0.76–1.27)
EOSINOPHIL # BLD AUTO: 0.2 X10E3/UL (ref 0–0.4)
EOSINOPHIL NFR BLD AUTO: 3 %
ERYTHROCYTE [DISTWIDTH] IN BLOOD BY AUTOMATED COUNT: 14.6 % (ref 12.3–15.4)
GLOBULIN SER CALC-MCNC: 3 G/DL (ref 1.5–4.5)
GLUCOSE SERPL-MCNC: 114 MG/DL (ref 65–99)
HCT VFR BLD AUTO: 46.4 % (ref 37.5–51)
HDLC SERPL-MCNC: 32 MG/DL
HGB BLD-MCNC: 15.7 G/DL (ref 13–17.7)
IMM GRANULOCYTES # BLD AUTO: 0.1 X10E3/UL (ref 0–0.1)
IMM GRANULOCYTES NFR BLD AUTO: 1 %
LDLC SERPL CALC-MCNC: 121 MG/DL (ref 0–99)
LYMPHOCYTES # BLD AUTO: 2.6 X10E3/UL (ref 0.7–3.1)
LYMPHOCYTES NFR BLD AUTO: 31 %
MCH RBC QN AUTO: 30.3 PG (ref 26.6–33)
MCHC RBC AUTO-ENTMCNC: 33.8 G/DL (ref 31.5–35.7)
MCV RBC AUTO: 90 FL (ref 79–97)
MONOCYTES # BLD AUTO: 0.6 X10E3/UL (ref 0.1–0.9)
MONOCYTES NFR BLD AUTO: 7 %
NEUTROPHILS # BLD AUTO: 4.9 X10E3/UL (ref 1.4–7)
NEUTROPHILS NFR BLD AUTO: 57 %
PLATELET # BLD AUTO: 236 X10E3/UL (ref 150–450)
POTASSIUM SERPL-SCNC: 4.7 MMOL/L (ref 3.5–5.2)
PROT SERPL-MCNC: 7.5 G/DL (ref 6–8.5)
RBC # BLD AUTO: 5.18 X10E6/UL (ref 4.14–5.8)
SODIUM SERPL-SCNC: 142 MMOL/L (ref 134–144)
T3FREE SERPL-MCNC: 2.9 PG/ML (ref 2–4.4)
T4 FREE SERPL-MCNC: 1.46 NG/DL (ref 0.82–1.77)
TRIGL SERPL-MCNC: 104 MG/DL (ref 0–149)
TSH SERPL DL<=0.005 MIU/L-ACNC: 2.53 UIU/ML (ref 0.45–4.5)
URATE SERPL-MCNC: 10.4 MG/DL (ref 3.7–8.6)
VLDLC SERPL CALC-MCNC: 21 MG/DL (ref 5–40)
WBC # BLD AUTO: 8.4 X10E3/UL (ref 3.4–10.8)

## 2019-12-30 ENCOUNTER — TELEPHONE (OUTPATIENT)
Dept: FAMILY MEDICINE | Facility: CLINIC | Age: 44
End: 2019-12-30

## 2019-12-30 NOTE — TELEPHONE ENCOUNTER
Called patient and left message via voicemail, instructing patient to give the clinic a call in regards to his recent lab work.

## 2019-12-30 NOTE — TELEPHONE ENCOUNTER
----- Message from VIOLET Roque sent at 12/30/2019  1:41 PM CST -----  Please call patient. Labs look ok but shows an increased uric acid level. He must remain compliant with his gout medications as prescribed and continue to follow a gout diet closely. F/U with Dr. Duran as scheduled.

## 2019-12-30 NOTE — PROGRESS NOTES
Please call patient. Labs look ok but shows an increased uric acid level. He must remain compliant with his gout medications as prescribed and continue to follow a gout diet closely. F/U with Dr. Duran as scheduled.

## 2020-01-02 ENCOUNTER — OFFICE VISIT (OUTPATIENT)
Dept: PODIATRY | Facility: CLINIC | Age: 45
End: 2020-01-02
Payer: COMMERCIAL

## 2020-01-02 VITALS
HEIGHT: 73 IN | HEART RATE: 67 BPM | BODY MASS INDEX: 34.19 KG/M2 | WEIGHT: 258 LBS | DIASTOLIC BLOOD PRESSURE: 89 MMHG | SYSTOLIC BLOOD PRESSURE: 131 MMHG

## 2020-01-02 DIAGNOSIS — L60.0 INGROWN NAIL: Primary | ICD-10-CM

## 2020-01-02 PROCEDURE — 3008F BODY MASS INDEX DOCD: CPT | Mod: S$GLB,,, | Performed by: PODIATRIST

## 2020-01-02 PROCEDURE — 3075F PR MOST RECENT SYSTOLIC BLOOD PRESS GE 130-139MM HG: ICD-10-PCS | Mod: S$GLB,,, | Performed by: PODIATRIST

## 2020-01-02 PROCEDURE — 3079F DIAST BP 80-89 MM HG: CPT | Mod: S$GLB,,, | Performed by: PODIATRIST

## 2020-01-02 PROCEDURE — 11730 AVULSION NAIL PLATE SIMPLE 1: CPT | Mod: TA,S$GLB,, | Performed by: PODIATRIST

## 2020-01-02 PROCEDURE — 3008F PR BODY MASS INDEX (BMI) DOCUMENTED: ICD-10-PCS | Mod: S$GLB,,, | Performed by: PODIATRIST

## 2020-01-02 PROCEDURE — 99203 OFFICE O/P NEW LOW 30 MIN: CPT | Mod: 25,S$GLB,, | Performed by: PODIATRIST

## 2020-01-02 PROCEDURE — 99203 PR OFFICE/OUTPT VISIT, NEW, LEVL III, 30-44 MIN: ICD-10-PCS | Mod: 25,S$GLB,, | Performed by: PODIATRIST

## 2020-01-02 PROCEDURE — 3075F SYST BP GE 130 - 139MM HG: CPT | Mod: S$GLB,,, | Performed by: PODIATRIST

## 2020-01-02 PROCEDURE — 3079F PR MOST RECENT DIASTOLIC BLOOD PRESSURE 80-89 MM HG: ICD-10-PCS | Mod: S$GLB,,, | Performed by: PODIATRIST

## 2020-01-02 PROCEDURE — 11730 NAIL REMOVAL: ICD-10-PCS | Mod: TA,S$GLB,, | Performed by: PODIATRIST

## 2020-01-02 NOTE — LETTER
January 2, 2020      Kennedi Horn, FNP  [address] [facility]  [address]  [place] [address]           [facility] - Podiatry  [address]  [place] [address]  Phone: [phone]  Fax: [phone]          Patient: Jose Fox   MR Number: [id number]   YOB: 1975   Date of Visit: 1/2/2020       Dear Kennedi Horn:    Thank you for referring Jose Fox to me for evaluation. Attached you will find relevant portions of my assessment and plan of care.    If you have questions, please do not hesitate to call me. I look forward to following Jose Fox along with you.    Sincerely,    Otoniel Owens, EVELIN    Enclosure  CC:  No Recipients    If you would like to receive this communication electronically, please contact [email or web address] or [phone] to request more information on [employer] Link access.    For providers and/or their staff who would like to refer a patient to [facility], please contact us through our one-stop-shop provider referral line, [facility], at [phone].    If you feel you have received this communication in error or would no longer like to receive these types of communications, please e-mail [email or web address]

## 2020-01-02 NOTE — PROGRESS NOTES
[address] [facility] [address]  [place] [address]  Phone: [phone]   [phone]    Patient's PCP:Willie Duran MD  Referring Provider: Kennedi Horn    Subjective:      Chief Complaint:: Ingrown Toenail (left first toenail)    PHANI Fox is a 44 y.o. male who presents with a complaint of  Ingrow first left nail lasting for awhile. Onset of the symptoms was pain.  Current symptoms include inflammation, discharge, warmth to touch, sensitivity to applied pressure .  Aggravating factors prolonged walking, close toe shoe. Symptoms have decreased.Treatment to date have included trimming, antibiotics, cream. Patients rates pain 3/10 on pain scale.      There were no vitals filed for this visit.  Shoe Size: 12    Past Surgical History:   Procedure Laterality Date    LEG SURGERY       Past Medical History:   Diagnosis Date    Gout     Hypertension     Thyroid disease      Family History   Problem Relation Age of Onset    Hypertension Mother     Hypertension Father     Diabetes Father         Social History:   Marital Status: Single  Alcohol History:  reports that he does not drink alcohol.  Tobacco History:  reports that he has never smoked. He has never used smokeless tobacco.  Drug History:  reports that he does not use drugs.    Review of patient's allergies indicates:  No Known Allergies    Current Outpatient Medications   Medication Sig Dispense Refill    bacitracin 500 unit/gram ointment Apply topically 2 (two) times daily. 14 g 0    cyclobenzaprine (FLEXERIL) 10 MG tablet Take 1 tablet (10 mg total) by mouth 3 (three) times daily as needed for Muscle spasms. 30 tablet 0    escitalopram oxalate (LEXAPRO) 10 MG tablet Take 1 tablet (10 mg total) by mouth once daily. 30 tablet 1    ibuprofen (ADVIL,MOTRIN) 800 MG tablet Take 1 tablet (800 mg total) by mouth 3 (three) times daily as needed for Pain. With meals 60 tablet 0    indomethacin (INDOCIN) 50 MG capsule Take 1 capsule (50 mg  total) by mouth once daily. With meal 30 capsule 1    losartan (COZAAR) 50 MG tablet Take 1 tablet (50 mg total) by mouth once daily. 30 tablet 1    methIMAzole (TAPAZOLE) 10 MG Tab Take 1 tablet (10 mg total) by mouth once daily. 30 tablet 1     No current facility-administered medications for this visit.        Review of Systems      Objective:        Physical Exam:   Foot Exam    General  General Appearance: appears stated age and healthy   Orientation: alert and oriented to person, place, and time   Affect: appropriate   Gait: unimpaired       Right Foot/Ankle     Neurovascular  Dorsalis pedis: 2+  Posterior tibial: 2+  Saphenous nerve sensation: normal  Tibial nerve sensation: normal  Superficial peroneal nerve sensation: normal  Deep peroneal nerve sensation: normal  Sural nerve sensation: normal      Left Foot/Ankle      Neurovascular  Dorsalis pedis: 2+  Posterior tibial: 2+  Saphenous nerve sensation: normal  Tibial nerve sensation: normal  Superficial peroneal nerve sensation: normal  Deep peroneal nerve sensation: normal  Sural nerve sensation: normal          Physical Exam   Cardiovascular:   Pulses:       Dorsalis pedis pulses are 2+ on the right side, and 2+ on the left side.        Posterior tibial pulses are 2+ on the right side, and 2+ on the left side.   Musculoskeletal:        Feet:            Imaging:            Assessment:       1. Ingrown nail - Left Foot      Plan:   Ingrown nail - Left Foot     Since is is 1st occurrence of an ingrown toenail and since that has resolved somewhat with soaks and home care with decide the dated do just a partial avulsion.  It regrows and causes problems again that will do a matricectomy.  No follow-ups on file.    Nail Removal  Date/Time: 1/2/2020 10:00 AM  Performed by: Otoniel Owens DPM  Authorized by: Otoniel Owens DPM     Consent Done?:  Yes (Written)    Location:  Left foot  Location detail:  Left big toe  Anesthesia:  Digital block  Local  anesthetic: lidocaine 2% without epinephrine  Anesthetic total (ml):  5    Amount removed:  Partial  Nail removed location: Lateral border.  Wedge excision of skin of nail fold: No    Nail bed sutured?: No    Nail matrix removed:  None  Removed nail replaced and anchored: No    Dressing applied:  4x4, gauze roll and antibiotic ointment  Patient tolerance:  Patient tolerated the procedure well with no immediate complications     He will change dressing daily apply Neosporin and soak 1st toe.  Once the avulsion site is dry he can discontinue dressings.  He is to return as needed.     -     Counseling:     I provided patient education verbally regarding:   Patient diagnosis, treatment options, as well as alternatives, risks, and benefits.     This note was created using Dragon voice recognition software that occasionally misinterpreted phrases or words.

## 2020-01-08 ENCOUNTER — TELEPHONE (OUTPATIENT)
Dept: FAMILY MEDICINE | Facility: CLINIC | Age: 45
End: 2020-01-08

## 2020-01-08 NOTE — TELEPHONE ENCOUNTER
Pt called back about labs. Pt verbalized understanding. He also stated he is suppose to be on Uloric 40 mg but his insurance company said he has to have something cheap sent in so it will be covered.

## 2020-01-09 NOTE — TELEPHONE ENCOUNTER
Is he having symptoms of a gout flare right now? Also, is he still taking the uloric or has he been off it and if he has been off it, how long?

## 2020-01-10 ENCOUNTER — TELEPHONE (OUTPATIENT)
Dept: FAMILY MEDICINE | Facility: CLINIC | Age: 45
End: 2020-01-10

## 2020-01-10 DIAGNOSIS — Z87.39 PERSONAL HISTORY OF GOUT: Primary | ICD-10-CM

## 2020-01-10 RX ORDER — ALLOPURINOL 100 MG/1
100 TABLET ORAL DAILY
Qty: 30 TABLET | Refills: 0 | Status: SHIPPED | OUTPATIENT
Start: 2020-01-10 | End: 2020-01-30

## 2020-01-10 RX ORDER — COLCHICINE 0.6 MG/1
0.6 TABLET ORAL DAILY
Qty: 14 TABLET | Refills: 0 | Status: SHIPPED | OUTPATIENT
Start: 2020-01-10 | End: 2020-05-27 | Stop reason: SDUPTHER

## 2020-01-10 NOTE — TELEPHONE ENCOUNTER
Called pt. He is having no gout sx currently. He has been off of Uloric for 2 months now. Pt stated Uloric helps keep his uric acid levels down.

## 2020-01-10 NOTE — PROGRESS NOTES
Restarting allopurinol as preventive gout medication since Uloric is no longer covered by insurance. Allopurinol 100mg once daily. Also starting colchicine 0.6mg once daily for two weeks to avoid gout flare up with restarting preventive medication. [facility]. Watch diet and exercise to lose weight which will help decrease gout flare ups. F/U with Dr. Duran on 1/30/2020 as scheduled.

## 2020-01-30 ENCOUNTER — OFFICE VISIT (OUTPATIENT)
Dept: FAMILY MEDICINE | Facility: CLINIC | Age: 45
End: 2020-01-30
Payer: COMMERCIAL

## 2020-01-30 VITALS
HEIGHT: 73 IN | BODY MASS INDEX: 34.72 KG/M2 | DIASTOLIC BLOOD PRESSURE: 85 MMHG | SYSTOLIC BLOOD PRESSURE: 136 MMHG | WEIGHT: 262 LBS | HEART RATE: 78 BPM

## 2020-01-30 DIAGNOSIS — I10 ESSENTIAL HYPERTENSION: Primary | ICD-10-CM

## 2020-01-30 DIAGNOSIS — F32.A DEPRESSION, UNSPECIFIED DEPRESSION TYPE: ICD-10-CM

## 2020-01-30 DIAGNOSIS — M10.9 GOUT, UNSPECIFIED CAUSE, UNSPECIFIED CHRONICITY, UNSPECIFIED SITE: ICD-10-CM

## 2020-01-30 PROCEDURE — 99213 OFFICE O/P EST LOW 20 MIN: CPT | Mod: S$GLB,,, | Performed by: INTERNAL MEDICINE

## 2020-01-30 PROCEDURE — 3075F PR MOST RECENT SYSTOLIC BLOOD PRESS GE 130-139MM HG: ICD-10-PCS | Mod: S$GLB,,, | Performed by: INTERNAL MEDICINE

## 2020-01-30 PROCEDURE — 3008F BODY MASS INDEX DOCD: CPT | Mod: S$GLB,,, | Performed by: INTERNAL MEDICINE

## 2020-01-30 PROCEDURE — 3079F PR MOST RECENT DIASTOLIC BLOOD PRESSURE 80-89 MM HG: ICD-10-PCS | Mod: S$GLB,,, | Performed by: INTERNAL MEDICINE

## 2020-01-30 PROCEDURE — 3075F SYST BP GE 130 - 139MM HG: CPT | Mod: S$GLB,,, | Performed by: INTERNAL MEDICINE

## 2020-01-30 PROCEDURE — 3079F DIAST BP 80-89 MM HG: CPT | Mod: S$GLB,,, | Performed by: INTERNAL MEDICINE

## 2020-01-30 PROCEDURE — 3008F PR BODY MASS INDEX (BMI) DOCUMENTED: ICD-10-PCS | Mod: S$GLB,,, | Performed by: INTERNAL MEDICINE

## 2020-01-30 PROCEDURE — 99213 PR OFFICE/OUTPT VISIT, EST, LEVL III, 20-29 MIN: ICD-10-PCS | Mod: S$GLB,,, | Performed by: INTERNAL MEDICINE

## 2020-01-30 RX ORDER — INDOMETHACIN 50 MG/1
50 CAPSULE ORAL DAILY
Qty: 30 CAPSULE | Refills: 1 | Status: SHIPPED | OUTPATIENT
Start: 2020-01-30 | End: 2020-02-24

## 2020-01-30 RX ORDER — ESCITALOPRAM OXALATE 10 MG/1
15 TABLET ORAL DAILY
Qty: 45 TABLET | Refills: 5 | Status: SHIPPED | OUTPATIENT
Start: 2020-01-30 | End: 2020-05-06

## 2020-01-30 NOTE — PATIENT INSTRUCTIONS
Depression  Depression is one of the most common mental health problems today. It is not just a state of unhappiness or sadness. It is a true disease. The cause seems to be related to a decrease in chemicals that transmit signals in the brain. Having a family history of depression, alcoholism, or suicide increases the risk. Chronic illness, chronic pain, migraine headaches and high emotional stress also increase the risk.  Depression is something we tend to recognize in others, but may have a hard time seeing in ourselves. It can show in many physical and emotional ways:  · Loss of appetite  · Over-eating  · Not being able to sleep  · Sleeping too much  · Tiredness not related to physical exertion  · Restlessness or irritability  · Slowness of movement or speech  · Feeling depressed or withdrawn  · Loss of interest in things you once enjoyed  · Trouble concentrating, poor memory, trouble making decisions  · Thoughts of harming or killing oneself, or thoughts that life is not worth living  · Low self-esteem  The treatment for depression may include both medicine and psychotherapy. Antidepressants can reduce suffering and can improve the ability to function during the depressed period. Therapy can offer emotional support and help you understand emotional factors that may be causing the depression.  Home care  · On-going care and support helps people manage this disease.  Find a healthcare provider and therapist who meet your needs. Seek help when you feel like you may be getting ill.  · Be kind to yourself. Make it a point to do things that you enjoy (gardening, walking in nature, going to a movie, etc.). Reward yourself for small successes.  · Take care of your physical body. Eat a balanced diet (low in saturated fat and high in fruits and vegetables). Exercise at least 3 times a week for 30 minutes. Even mild-moderate exercise (like brisk walking) can make you feel better.  · Avoid alcohol, which can make  depression worse.  · Take medicine as prescribed.  · Tell each of your healthcare providers about all of the prescription drugs, over-the-counter medicines, vitamins, and supplements you take. Certain supplements interact with medicines and can result in dangerous side effects. Ask your pharmacist when you have questions about drug interactions.  · Talk with your family and trusted friends about your feelings and thoughts. Ask them to help you recognize behavior changes early so you can get help and, if needed, medicine can be adjusted.  Follow-up care  Follow up with your healthcare provider, or as advised.  Call 911  Call 911 if you:  · Have suicidal thoughts, a suicide plan, and the means to carry out the plan  · Have trouble breathing  · Are very confused  · Feel very drowsy or have trouble awakening  · Faint or lose consciousness  · Have new chest pain that becomes more severe, lasts longer, or spreads into your shoulder, arm, neck, jaw or back  When to seek medical advice  Call your healthcare provider right away if any of these occur:  · Feeling extreme depression, fear, anxiety, or anger toward yourself or others  · Feeling out of control  · Feeling that you may try to harm yourself or another  · Hearing voices that others do not hear  · Seeing things that others do not see  · Cant sleep or eat for 3 days in a row  · Friends or family express concern over your behavior and ask you to seek help  Date Last Reviewed: 9/29/2015  © 3106-3940 [employer]. [address] [place] [address]. All rights reserved. This information is not intended as a substitute for professional medical care. Always follow your healthcare professional's instructions.        Depression Affects Your Mind and Body  Everyone feels sad or blue from time to time for a few days or weeks. Depression is when these feelings don't go away and they interfere with daily life.  Depression is a real illness that can develop  at any age. It is one of the most common mental health problems in the [place] Depression makes you feel sad, helpless, and hopeless. It gets in the way of your life and relationships. It inhibits your ability to think and act. But, with help, you can feel better again.     When I was depressed, I felt awful. I was so tired all the time I could hardly think, but at night I couldnt fall asleep. My head hurt. My stomach hurt. I didnt know what was wrong with me.   Depression affects your whole body  Brain chemicals affect your body as well as your mood. So depression may do more than just make you feel low. You may also feel bad physically. Depression can:  · Cause trouble with mental tasks such as remembering, concentrating, or making decisions  · Make you feel nervous and jumpy  · Cause trouble sleeping. Or you may sleep too much  · Change your appetite  · Cause headaches, stomachaches, or other aches and pains  · Drain your body of energy  Depression and other illness  It is common for people who have chronic health problems to also have depression. It can often be hard to tell which one caused the other. A person might become depressed after finding out they have a health problem. But some studies suggest being depressed may make certain health problems more likely. And some depressed people stop taking care of themselves. This may make them more likely to get sick.  Date Last Reviewed: 1/1/2017  © 2514-9183 [employer]. [address] [place] [address], [place] [address]. All rights reserved. This information is not intended as a substitute for professional medical care. Always follow your healthcare professional's instructions.

## 2020-01-30 NOTE — PROGRESS NOTES
Subjective:       Patient ID: Jose Fox is a 44 y.o. male.    Chief Complaint: Depression; Gout; and Hypertension    Mr. Phan is a pleasant 44-year-old  gentleman who comes for follow-up.  Previously was seen by my nurse practitioner  Kennedi and he was prescribed Lexapro 10 mg.  He has been on Lexapro because of feeling of depression and some degree of sadness in his life.  More than 10 years ago his 1st wife was murdered and he had to raise 2 children.  Thereafter he had  his 2nd wife and the relationship did not not her to long and they were .  He has 3 children now including a 18-year-old daughter from this 2nd wife.    He works as a  for a local [employer] at [place].  He is also helping out his 23-year-old son who had problems with addiction but is recovering fairly well at this point.  Financially he is doing okay.  He is in a relationship with another female at this point which is modest as far as intimacy is concerned.    With Lexapro his feeling of depression is 7/10 and before that it was 5/10.  He agrees that he might do well with slight increase in the dosage of medication.  He denies any major side effects like sexual dysfunction or weight gain.    His gout symptoms seem to be doing okay.  He takes indomethacin as needed.  He could not tolerate allopurinol.  Blood pressures are stable at this point.  He is compliant to his medications.  No structured exercise program at this point.    Depression   Visit Type: follow-up  Patient presents with the following symptoms: anhedonia.  Patient is not experiencing: confusion, nervousness/anxiety, palpitations and shortness of breath.  Frequency of symptoms: most days   Severity: moderate     Hypertension   This is a chronic problem. The current episode started more than 1 year ago. The problem is controlled. Pertinent negatives include no chest pain, palpitations or shortness of breath. Risk factors for coronary artery  disease include male gender, obesity and dyslipidemia. The current treatment provides moderate improvement. Compliance problems include psychosocial issues.        Past Medical History:   Diagnosis Date    Gout     Hypertension     Thyroid disease      Social History     Socioeconomic History    Marital status: Single     Spouse name: Not on file    Number of children: 3    Years of education: Not on file    Highest education level: Not on file   Occupational History    Not on file   Social Needs    Financial resource strain: Not on file    Food insecurity:     Worry: Not on file     Inability: Not on file    Transportation needs:     Medical: Not on file     Non-medical: Not on file   Tobacco Use    Smoking status: Never Smoker    Smokeless tobacco: Never Used   Substance and Sexual Activity    Alcohol use: Never     Frequency: Never    Drug use: Never    Sexual activity: Yes     Partners: Female   Lifestyle    Physical activity:     Days per week: Not on file     Minutes per session: Not on file    Stress: Only a little   Relationships    Social connections:     Talks on phone: Not on file     Gets together: Not on file     Attends Church service: Not on file     Active member of club or organization: Not on file     Attends meetings of clubs or organizations: Not on file     Relationship status: Not on file   Other Topics Concern    Not on file   Social History Narrative    23.18    25      Past Surgical History:   Procedure Laterality Date    LEG SURGERY       Family History   Problem Relation Age of Onset    Hypertension Mother     Hypertension Father     Diabetes Father        Review of Systems   Constitutional: Negative for activity change, appetite change, fatigue and unexpected weight change.   HENT: Negative for congestion, sneezing and trouble swallowing.    Eyes: Negative for pain and visual disturbance.   Respiratory: Negative for cough, chest tightness and shortness of  "breath.    Cardiovascular: Negative for chest pain, palpitations and leg swelling.        HTN   Gastrointestinal: Negative for abdominal distention, abdominal pain, blood in stool, constipation and diarrhea.   Endocrine: Negative for cold intolerance, heat intolerance, polydipsia, polyphagia and polyuria.        GOUT     Genitourinary: Negative for dysuria, hematuria and scrotal swelling.   Musculoskeletal: Negative for arthralgias, back pain and gait problem.   Skin: Negative for pallor, rash and wound.   Allergic/Immunologic: Negative for environmental allergies, food allergies and immunocompromised state.   Neurological: Negative for dizziness, seizures, speech difficulty, light-headedness and numbness.   Hematological: Negative for adenopathy. Does not bruise/bleed easily.   Psychiatric/Behavioral: Positive for behavioral problems and depression. Negative for agitation and confusion. The patient is not nervous/anxious.         Depressive anxiety         Objective:      Blood pressure 136/85, pulse 78, height 6' 1" (1.854 m), weight 118.8 kg (262 lb). Body mass index is 34.57 kg/m².  Physical Exam   Constitutional: He is oriented to person, place, and time. He appears well-developed.   HENT:   Head: Normocephalic and atraumatic.   Nose: Nose normal.   Mouth/Throat: Oropharynx is clear and moist. No oropharyngeal exudate.   Eyes: Conjunctivae and EOM are normal.   Neck: Normal range of motion. Neck supple. No JVD present. No tracheal deviation present. No thyromegaly present.   Cardiovascular: Normal rate, regular rhythm and normal heart sounds. Exam reveals no gallop and no friction rub.   No murmur heard.  Pulmonary/Chest: Effort normal and breath sounds normal. No respiratory distress. He has no wheezes.   Abdominal: Soft. Bowel sounds are normal. He exhibits no distension. There is no tenderness.   Neurological: He is alert and oriented to person, place, and time. He has normal reflexes.   Skin: Skin is warm " and dry.   Psychiatric: He has a normal mood and affect.   Nursing note and vitals reviewed.        Assessment:       1. Essential hypertension    2. Depression, unspecified depression type    3. Gout, unspecified cause, unspecified chronicity, unspecified site           Office Visit on 12/12/2019   Component Date Value Ref Range Status    TSH 12/27/2019 2.530  0.450 - 4.500 uIU/mL Final    T4, Free 12/27/2019 1.46  0.82 - 1.77 ng/dL Final    T3, Free 12/27/2019 2.9  2.0 - 4.4 pg/mL Final    WBC 12/27/2019 8.4  3.4 - 10.8 x10E3/uL Final    RBC 12/27/2019 5.18  4.14 - 5.80 x10E6/uL Final    Hemoglobin 12/27/2019 15.7  13.0 - 17.7 g/dL Final    Hematocrit 12/27/2019 46.4  37.5 - 51.0 % Final    Mean Corpuscular Volume 12/27/2019 90  79 - 97 fL Final    Mean Corpuscular Hemoglobin 12/27/2019 30.3  26.6 - 33.0 pg Final    Mean Corpuscular Hemoglobin Conc 12/27/2019 33.8  31.5 - 35.7 g/dL Final    RDW 12/27/2019 14.6  12.3 - 15.4 % Final    Platelets 12/27/2019 236  150 - 450 x10E3/uL Final    Neutrophils 12/27/2019 57  Not Estab. % Final    Lymph% 12/27/2019 31  Not Estab. % Final    Mono% 12/27/2019 7  Not Estab. % Final    Eosinophil% 12/27/2019 3  Not Estab. % Final    Basophil% 12/27/2019 1  Not Estab. % Final    Neutrophils Absolute 12/27/2019 4.9  1.4 - 7.0 x10E3/uL Final    Lymph # 12/27/2019 2.6  0.7 - 3.1 x10E3/uL Final    Mono # 12/27/2019 0.6  0.1 - 0.9 x10E3/uL Final    Eos # 12/27/2019 0.2  0.0 - 0.4 x10E3/uL Final    Baso # 12/27/2019 0.1  0.0 - 0.2 x10E3/uL Final    Immature Granulocytes 12/27/2019 1  Not Estab. % Final    Immature Grans (Abs) 12/27/2019 0.1  0.0 - 0.1 x10E3/uL Final    Glucose 12/27/2019 114* 65 - 99 mg/dL Final    BUN, Bld 12/27/2019 15  6 - 24 mg/dL Final    Creatinine 12/27/2019 1.30* 0.76 - 1.27 mg/dL Final    eGFR if non African American 12/27/2019 65  >59 mL/min/1.73 Final    eGFR if  12/27/2019 76  >59 mL/min/1.73 Final     BUN/Creatinine Ratio 12/27/2019 12  9 - 20 Final    Sodium 12/27/2019 142  134 - 144 mmol/L Final    Potassium 12/27/2019 4.7  3.5 - 5.2 mmol/L Final    Chloride 12/27/2019 105  96 - 106 mmol/L Final    CO2 12/27/2019 23  20 - 29 mmol/L Final    Calcium 12/27/2019 9.4  8.7 - 10.2 mg/dL Final    Total Protein 12/27/2019 7.5  6.0 - 8.5 g/dL Final    Albumin 12/27/2019 4.5  3.5 - 5.5 g/dL Final    Globulin, Total 12/27/2019 3.0  1.5 - 4.5 g/dL Final    Albumin/Globulin Ratio 12/27/2019 1.5  1.2 - 2.2 Final    Total Bilirubin 12/27/2019 0.9  0.0 - 1.2 mg/dL Final    Alkaline Phosphatase 12/27/2019 77  39 - 117 IU/L Final    AST 12/27/2019 25  0 - 40 IU/L Final    ALT 12/27/2019 26  0 - 44 IU/L Final    Uric Acid 12/27/2019 10.4* 3.7 - 8.6 mg/dL Final    Cholesterol 12/27/2019 174  100 - 199 mg/dL Final    Triglycerides 12/27/2019 104  0 - 149 mg/dL Final    HDL 12/27/2019 32* >39 mg/dL Final    VLDL Cholesterol Sam 12/27/2019 21  5 - 40 mg/dL Final    LDL Calculated 12/27/2019 121* 0 - 99 mg/dL Final         Plan:           Essential hypertension    Depression, unspecified depression type  -     escitalopram oxalate (LEXAPRO) 10 MG tablet; Take 1.5 tablets (15 mg total) by mouth once daily.  Dispense: 45 tablet; Refill: 5    Gout, unspecified cause, unspecified chronicity, unspecified site  -     indomethacin (INDOCIN) 50 MG capsule; Take 1 capsule (50 mg total) by mouth once daily. With meal  Dispense: 30 capsule; Refill: 1     I will increase Lexapro to 15 mg per day.    Recommended exercise.  Recommended life issues including working towards goals with reasonable expectations.  Try to keep interpersonal relationship with his children.    Patient has been advised to watch diet and exercise. Avoid fried and fatty food. Compliance to medications and follow up urged.          Current Outpatient Medications:     colchicine (COLCRYS) 0.6 mg tablet, Take 1 tablet (0.6 mg total) by mouth once daily.,  Disp: 14 tablet, Rfl: 0    escitalopram oxalate (LEXAPRO) 10 MG tablet, Take 1.5 tablets (15 mg total) by mouth once daily., Disp: 45 tablet, Rfl: 5    ibuprofen (ADVIL,MOTRIN) 800 MG tablet, Take 1 tablet (800 mg total) by mouth 3 (three) times daily as needed for Pain. With meals, Disp: 60 tablet, Rfl: 0    indomethacin (INDOCIN) 50 MG capsule, Take 1 capsule (50 mg total) by mouth once daily. With meal, Disp: 30 capsule, Rfl: 1    losartan (COZAAR) 50 MG tablet, Take 1 tablet (50 mg total) by mouth once daily., Disp: 30 tablet, Rfl: 1    methIMAzole (TAPAZOLE) 10 MG Tab, Take 1 tablet (10 mg total) by mouth once daily., Disp: 30 tablet, Rfl: 1

## 2020-02-24 DIAGNOSIS — M10.9 GOUT, UNSPECIFIED CAUSE, UNSPECIFIED CHRONICITY, UNSPECIFIED SITE: ICD-10-CM

## 2020-02-24 RX ORDER — INDOMETHACIN 50 MG/1
CAPSULE ORAL
Qty: 30 CAPSULE | Refills: 1 | Status: SHIPPED | OUTPATIENT
Start: 2020-02-24 | End: 2020-03-25 | Stop reason: SDUPTHER

## 2020-03-25 ENCOUNTER — OFFICE VISIT (OUTPATIENT)
Dept: FAMILY MEDICINE | Facility: CLINIC | Age: 45
End: 2020-03-25
Payer: COMMERCIAL

## 2020-03-25 VITALS
SYSTOLIC BLOOD PRESSURE: 137 MMHG | BODY MASS INDEX: 34.99 KG/M2 | DIASTOLIC BLOOD PRESSURE: 95 MMHG | WEIGHT: 264 LBS | HEART RATE: 98 BPM | HEIGHT: 73 IN | TEMPERATURE: 100 F

## 2020-03-25 DIAGNOSIS — I10 ESSENTIAL HYPERTENSION: ICD-10-CM

## 2020-03-25 DIAGNOSIS — M1A.0710 CHRONIC IDIOPATHIC GOUT INVOLVING TOE OF RIGHT FOOT WITHOUT TOPHUS: Primary | ICD-10-CM

## 2020-03-25 DIAGNOSIS — F32.9 REACTIVE DEPRESSION: ICD-10-CM

## 2020-03-25 DIAGNOSIS — M54.50 ACUTE LEFT-SIDED LOW BACK PAIN WITHOUT SCIATICA: ICD-10-CM

## 2020-03-25 PROCEDURE — 3008F PR BODY MASS INDEX (BMI) DOCUMENTED: ICD-10-PCS | Mod: S$GLB,,, | Performed by: INTERNAL MEDICINE

## 2020-03-25 PROCEDURE — 99214 OFFICE O/P EST MOD 30 MIN: CPT | Mod: S$GLB,,, | Performed by: INTERNAL MEDICINE

## 2020-03-25 PROCEDURE — 3075F PR MOST RECENT SYSTOLIC BLOOD PRESS GE 130-139MM HG: ICD-10-PCS | Mod: S$GLB,,, | Performed by: INTERNAL MEDICINE

## 2020-03-25 PROCEDURE — 3080F PR MOST RECENT DIASTOLIC BLOOD PRESSURE >= 90 MM HG: ICD-10-PCS | Mod: S$GLB,,, | Performed by: INTERNAL MEDICINE

## 2020-03-25 PROCEDURE — 3080F DIAST BP >= 90 MM HG: CPT | Mod: S$GLB,,, | Performed by: INTERNAL MEDICINE

## 2020-03-25 PROCEDURE — 3075F SYST BP GE 130 - 139MM HG: CPT | Mod: S$GLB,,, | Performed by: INTERNAL MEDICINE

## 2020-03-25 PROCEDURE — 3008F BODY MASS INDEX DOCD: CPT | Mod: S$GLB,,, | Performed by: INTERNAL MEDICINE

## 2020-03-25 PROCEDURE — 99214 PR OFFICE/OUTPT VISIT, EST, LEVL IV, 30-39 MIN: ICD-10-PCS | Mod: S$GLB,,, | Performed by: INTERNAL MEDICINE

## 2020-03-25 RX ORDER — METHOCARBAMOL 750 MG/1
750 TABLET, FILM COATED ORAL 2 TIMES DAILY PRN
Qty: 20 TABLET | Refills: 1 | Status: SHIPPED | OUTPATIENT
Start: 2020-03-25 | End: 2020-04-04

## 2020-03-25 RX ORDER — INDOMETHACIN 50 MG/1
50 CAPSULE ORAL
Qty: 30 CAPSULE | Refills: 2 | Status: SHIPPED | OUTPATIENT
Start: 2020-03-25 | End: 2020-05-28 | Stop reason: SDUPTHER

## 2020-03-25 RX ORDER — FEBUXOSTAT 40 MG/1
40 TABLET, FILM COATED ORAL DAILY
Qty: 90 TABLET | Refills: 1 | Status: SHIPPED | OUTPATIENT
Start: 2020-03-25 | End: 2020-05-06

## 2020-03-25 NOTE — PROGRESS NOTES
Subjective:       Patient ID: Jose Fox is a 45 y.o. male.    Chief Complaint: Back Pain; Gout; Hypertension; and Depression    Mr. Sylvester Fox is a pleasant 45-year-old gentleman who comes for evaluation.  He has been experiencing a back pain on the left lateral side which goes up and down.  He has been working in his yd recently and had to bend and stretch.  He denies any numbness in the legs or paresthesias.    He also is experiencing pain in the right 5th toe.  Historically he is known to have gouty arthritis.  His last uric acid level was greater than 8.  He was given indomethacin 50 mg at the rate of 1 tablet today which does not help him.  He feels he should be taking some more tablets.    He was also given prescription for allopurinol up to 300 mg per day which does not seem to help him prevent the attacks of gout.  In past when [place] was [place] another [place], he was given prescription for Uloric which seem to help him.  ([place])    Back Pain   This is a new problem. The current episode started in the past 7 days. The problem occurs constantly. The problem is unchanged. The pain is present in the thoracic spine, lumbar spine and costovertebral angle. The quality of the pain is described as aching. The pain does not radiate. The pain is at a severity of 4/10. The pain is moderate. The symptoms are aggravated by bending, position and twisting. Pertinent negatives include no abdominal pain, bladder incontinence, bowel incontinence, chest pain, dysuria, headaches, leg pain, numbness, pelvic pain, perianal numbness, tingling or weakness.   Hypertension   This is a chronic problem. The current episode started more than 1 year ago. The problem has been waxing and waning since onset. The problem is uncontrolled. Pertinent negatives include no chest pain, headaches, palpitations, peripheral edema or shortness of breath. Risk factors for coronary artery disease include male gender and obesity. Past treatments include  angiotensin blockers. The current treatment provides moderate improvement. Compliance problems include psychosocial issues.  There is no history of coarctation of the aorta or a hypertension causing med.   Depression   Visit Type: follow-up  Patient presents with the following symptoms: anhedonia, depressed mood and irritability.  Patient is not experiencing: choking sensation, confusion, nervousness/anxiety, palpitations and shortness of breath.  Frequency of symptoms: most days   Severity: moderate   Compliance with medications: Better with Lexapro 15 mg per day.            Past Medical History:   Diagnosis Date    Gout     Hypertension     Thyroid disease      Social History     Socioeconomic History    Marital status: Single     Spouse name: Not on file    Number of children: 3    Years of education: Not on file    Highest education level: Not on file   Occupational History    Occupation: [employer]      Comment: [employer]   Social Needs    Financial resource strain: Not on file    Food insecurity:     Worry: Not on file     Inability: Not on file    Transportation needs:     Medical: Not on file     Non-medical: Not on file   Tobacco Use    Smoking status: Never Smoker    Smokeless tobacco: Never Used   Substance and Sexual Activity    Alcohol use: Never     Frequency: Never    Drug use: Never    Sexual activity: Yes     Partners: Female   Lifestyle    Physical activity:     Days per week: Not on file     Minutes per session: Not on file    Stress: Only a little   Relationships    Social connections:     Talks on phone: Not on file     Gets together: Not on file     Attends Islam service: Not on file     Active member of club or organization: Not on file     Attends meetings of clubs or organizations: Not on file     Relationship status: Not on file   Other Topics Concern    Not on file   Social History Narrative    23.18    25      Past Surgical History:   Procedure Laterality  Date    LEG SURGERY       Family History   Problem Relation Age of Onset    Hypertension Mother     Hypertension Father     Diabetes Father        Review of Systems   Constitutional: Positive for irritability. Negative for activity change, appetite change, fatigue and unexpected weight change.   HENT: Negative for congestion, sneezing and trouble swallowing.    Eyes: Negative for pain and visual disturbance.   Respiratory: Negative for cough, choking, chest tightness and shortness of breath.    Cardiovascular: Negative for chest pain, palpitations and leg swelling.        HTN-blood pressures are somewhat uncontrolled today.   Gastrointestinal: Negative for abdominal distention, abdominal pain, blood in stool, bowel incontinence, constipation and diarrhea.   Endocrine: Negative for cold intolerance, heat intolerance, polydipsia, polyphagia and polyuria.        GOUT arthritis involving the toes, elbows and other joints in past.  Family history of gouty arthritis.     Genitourinary: Negative for bladder incontinence, dysuria, hematuria, pelvic pain and scrotal swelling.   Musculoskeletal: Positive for back pain. Negative for arthralgias and gait problem.   Skin: Negative for pallor, rash and wound.   Allergic/Immunologic: Negative for environmental allergies, food allergies and immunocompromised state.   Neurological: Negative for dizziness, tingling, seizures, speech difficulty, weakness, light-headedness, numbness and headaches.   Hematological: Negative for adenopathy. Does not bruise/bleed easily.   Psychiatric/Behavioral: Positive for behavioral problems and depression. Negative for agitation and confusion. The patient is not nervous/anxious.         Depressive anxiety-depression seems to be better with Lexapro 15 mg.    Discarding the effects of COVID 19, and his recent medical problems he rates his happiness as 7-8/10.  He is taking Lexapro.  Before taking Lexapro he was 3-4/10.         Objective:      Blood  "pressure (!) 137/95, pulse 98, temperature 99.6 °F (37.6 °C), height 6' 1" (1.854 m), weight 119.7 kg (264 lb). Body mass index is 34.83 kg/m².  Physical Exam   Constitutional: He appears well-developed.   HENT:   Head: Normocephalic and atraumatic.   Nose: Nose normal.   Mouth/Throat: Oropharynx is clear and moist. No oropharyngeal exudate.   Eyes: Conjunctivae and EOM are normal.   Neck: Normal range of motion. Neck supple. No JVD present. No tracheal deviation present. No thyromegaly present.   Cardiovascular: Normal rate, regular rhythm and normal heart sounds. Exam reveals no gallop and no friction rub.   No murmur heard.  Pulmonary/Chest: Effort normal and breath sounds normal. No respiratory distress. He has no wheezes.   Abdominal: Soft. Bowel sounds are normal. He exhibits no distension. There is no tenderness.   Musculoskeletal:   Patient is walking with a limp.   Neurological: He is alert. He has normal reflexes. He displays no tremor. He exhibits normal muscle tone. He displays no seizure activity.   SLR is negative bilaterally.   Skin: Skin is warm and dry.   Psychiatric: He has a normal mood and affect. His speech is not rapid and/or pressured. He is not agitated and not slowed. Cognition and memory are normal.   Somewhat uncomfortable and antalgic demeanor because of back pain.  Walking with a limp.   Nursing note and vitals reviewed.        Assessment:       1. 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 Essential hypertension    3. Acute left-sided low back pain without sciatica    4. Reactive depression           No visits with results within 3 Month(s) from this visit.   Latest known visit with results is:   Office Visit on 12/12/2019   Component Date Value Ref Range Status    TSH 12/27/2019 2.530  0.450 - 4.500 uIU/mL Final    T4, Free 12/27/2019 1.46  0.82 - 1.77 ng/dL Final    T3, Free 12/27/2019 2.9  2.0 - 4.4 pg/mL Final    WBC 12/27/2019 8.4  3.4 - 10.8 " x10E3/uL Final    RBC 12/27/2019 5.18  4.14 - 5.80 x10E6/uL Final    Hemoglobin 12/27/2019 15.7  13.0 - 17.7 g/dL Final    Hematocrit 12/27/2019 46.4  37.5 - 51.0 % Final    Mean Corpuscular Volume 12/27/2019 90  79 - 97 fL Final    Mean Corpuscular Hemoglobin 12/27/2019 30.3  26.6 - 33.0 pg Final    Mean Corpuscular Hemoglobin Conc 12/27/2019 33.8  31.5 - 35.7 g/dL Final    RDW 12/27/2019 14.6  12.3 - 15.4 % Final    Platelets 12/27/2019 236  150 - 450 x10E3/uL Final    Neutrophils 12/27/2019 57  Not Estab. % Final    Lymph% 12/27/2019 31  Not Estab. % Final    Mono% 12/27/2019 7  Not Estab. % Final    Eosinophil% 12/27/2019 3  Not Estab. % Final    Basophil% 12/27/2019 1  Not Estab. % Final    Neutrophils Absolute 12/27/2019 4.9  1.4 - 7.0 x10E3/uL Final    Lymph # 12/27/2019 2.6  0.7 - 3.1 x10E3/uL Final    Mono # 12/27/2019 0.6  0.1 - 0.9 x10E3/uL Final    Eos # 12/27/2019 0.2  0.0 - 0.4 x10E3/uL Final    Baso # 12/27/2019 0.1  0.0 - 0.2 x10E3/uL Final    Immature Granulocytes 12/27/2019 1  Not Estab. % Final    Immature Grans (Abs) 12/27/2019 0.1  0.0 - 0.1 x10E3/uL Final    Glucose 12/27/2019 114* 65 - 99 mg/dL Final    BUN, Bld 12/27/2019 15  6 - 24 mg/dL Final    Creatinine 12/27/2019 1.30* 0.76 - 1.27 mg/dL Final    eGFR if non African American 12/27/2019 65  >59 mL/min/1.73 Final    eGFR if  12/27/2019 76  >59 mL/min/1.73 Final    BUN/Creatinine Ratio 12/27/2019 12  9 - 20 Final    Sodium 12/27/2019 142  134 - 144 mmol/L Final    Potassium 12/27/2019 4.7  3.5 - 5.2 mmol/L Final    Chloride 12/27/2019 105  96 - 106 mmol/L Final    CO2 12/27/2019 23  20 - 29 mmol/L Final    Calcium 12/27/2019 9.4  8.7 - 10.2 mg/dL Final    Total Protein 12/27/2019 7.5  6.0 - 8.5 g/dL Final    Albumin 12/27/2019 4.5  3.5 - 5.5 g/dL Final    Globulin, Total 12/27/2019 3.0  1.5 - 4.5 g/dL Final    Albumin/Globulin Ratio 12/27/2019 1.5  1.2 - 2.2 Final    Total Bilirubin  12/27/2019 0.9  0.0 - 1.2 mg/dL Final    Alkaline Phosphatase 12/27/2019 77  39 - 117 IU/L Final    AST 12/27/2019 25  0 - 40 IU/L Final    ALT 12/27/2019 26  0 - 44 IU/L Final    Uric Acid 12/27/2019 10.4* 3.7 - 8.6 mg/dL Final    Cholesterol 12/27/2019 174  100 - 199 mg/dL Final    Triglycerides 12/27/2019 104  0 - 149 mg/dL Final    HDL 12/27/2019 32* >39 mg/dL Final    VLDL Cholesterol Sam 12/27/2019 21  5 - 40 mg/dL Final    LDL Calculated 12/27/2019 121* 0 - 99 mg/dL Final         Plan:           Chronic idiopathic gout involving toe of right foot without tophus  -     febuxostat (ULORIC) 40 mg Tab; Take 1 tablet (40 mg total) by mouth once daily.  Dispense: 90 tablet; Refill: 1  -     indomethacin (INDOCIN) 50 MG capsule; Take 1 capsule (50 mg total) by mouth 3 (three) times daily with meals.  Dispense: 30 capsule; Refill: 2    Essential hypertension    Acute left-sided low back pain without sciatica  -     methocarbamoL (ROBAXIN) 750 MG Tab; Take 1 tablet (750 mg total) by mouth 2 (two) times daily as needed. Take medication upper twice a day as needed for muscle spasm and back aches.  Do not drive on this medication.  Dispense: 20 tablet; Refill: 1    Reactive depression      Patient will be given indomethacin up to 3 times a day till his gout attack subsides.  After that he can start Uloric to prevent gout.  Some of the foods that aggravate gout are sea foods, shrimp, shellfish, mushrooms and red meats.  This with the dehydration and alcohol can aggravate gout.  There is a family history of gout.    Blood pressures are somewhat elevated and I have advised him to monitor his blood pressures at home  .    I am going to give him a muscle relaxer for his back pain and he will do some stretch exercises and gentle exercises for stretch and flexibility.  Avoid bending excessively.  Try not to do any significant strenuous activities like vacuuming the home or climbing up the attic with heavy  furniture.    As far as depression is concerned, he if seems to be more stable at this point.  He rates his happiness as 7-8/10.  Previously it was 3-4/10.  Will continue with Lexapro 15 mg per day.  Will review situation on depression at follow-up also.    Patient has been advised to watch diet and exercise. Avoid fried and fatty food. Compliance to medications and follow up urged.    Advised Mr. Fox  Low back posture maintenance, exercises-stretch. Watch for any bowel or bladder disturbance. Notifiy physician if any weakness or numbness develops. Back pain exercises given with explanation.    Follow-up in 6 weeks time to review blood pressures and back pain.    Spent kavita 25 minutes with patient which involved review of pts medical conditions, labs, medications and with 50% of time face-to-face discussion about medical problems, management and any applicable changes.      Current Outpatient Medications:     colchicine (COLCRYS) 0.6 mg tablet, Take 1 tablet (0.6 mg total) by mouth once daily., Disp: 14 tablet, Rfl: 0    escitalopram oxalate (LEXAPRO) 10 MG tablet, Take 1.5 tablets (15 mg total) by mouth once daily., Disp: 45 tablet, Rfl: 5    indomethacin (INDOCIN) 50 MG capsule, Take 1 capsule (50 mg total) by mouth 3 (three) times daily with meals., Disp: 30 capsule, Rfl: 2    losartan (COZAAR) 50 MG tablet, Take 1 tablet (50 mg total) by mouth once daily., Disp: 30 tablet, Rfl: 1    methIMAzole (TAPAZOLE) 10 MG Tab, Take 1 tablet (10 mg total) by mouth once daily., Disp: 30 tablet, Rfl: 1    febuxostat (ULORIC) 40 mg Tab, Take 1 tablet (40 mg total) by mouth once daily., Disp: 90 tablet, Rfl: 1    methocarbamoL (ROBAXIN) 750 MG Tab, Take 1 tablet (750 mg total) by mouth 2 (two) times daily as needed. Take medication upper twice a day as needed for muscle spasm and back aches.  Do not drive on this medication., Disp: 20 tablet, Rfl: 1

## 2020-03-25 NOTE — PATIENT INSTRUCTIONS
Back Care Tips    Caring for your back  These are things you can do to prevent a recurrence of acute back pain and to reduce symptoms from chronic back pain:  · Maintain a healthy weight. If you are overweight, losing weight will help most types of back pain.  · Exercise is an important part of recovery from most types of back pain. The muscles behind and in front of the spine support the back. This means strengthening both the back muscles and the abdominal muscles will provide better support for your spine.   · Swimming and brisk walking are good overall exercises to improve your fitness level.  · Practice safe lifting methods (below).  · Practice good posture when sitting, standing and walking. Avoid prolonged sitting. This puts more stress on the lower back than standing or walking.  · Wear quality shoes with sufficient arch support. Foot and ankle alignment can affect back symptoms. Women should avoid wearing high heels.  · Therapeutic massage can help relax the back muscles without stretching them.  · During the first 24 to 72 hours after an acute injury or flare-up of chronic back pain, apply an ice pack to the painful area for 20 minutes and then remove it for 20 minutes, over a period of 60 to 90 minutes, or several times a day. As a safety precaution, do not use a heating pad at bedtime. Sleeping on a heating pad can lead to skin burns or tissue damage.  · You can alternate ice and heat therapies.  Medications  Talk to your healthcare provider before using medicines, especially if you have other medical problems or are taking other medicines.  · You may use acetaminophen or ibuprofen to control pain, unless your healthcare provider prescribed other pain medicine. If you have chronic conditions like diabetes, liver or kidney disease, stomach ulcers, or gastrointestinal bleeding, or are taking blood thinners, talk with your healthcare provider before taking any medicines.  · Be careful if you are given  prescription pain medicines, narcotics, or medicine for muscle spasm. They can cause drowsiness, affect your coordination, reflexes, and judgment. Do not drive or operate heavy machinery while taking these types of medicines. Take prescription pain medicine only as prescribed by your healthcare provider.  Lumbar stretch  Here is a simple stretching exercise that will help relax muscle spasm and keep your back more limber. If exercise makes your back pain worse, dont do it.  · Lie on your back with your knees bent and both feet on the ground.  · Slowly raise your left knee to your chest as you flatten your lower back against the floor. Hold for 5 seconds.  · Relax and repeat the exercise with your right knee.  · Do 10 of these exercises for each leg.  Safe lifting method  · Dont bend over at the waist to lift an object off the floor.  Instead, bend your knees and hips in a squat.   · Keep your back and head upright  · Hold the object close to your body, directly in front of you.  · Straighten your legs to lift the object.   · Lower the object to the floor in the reverse fashion.  · If you must slide something across the floor, push it.  Posture tips  Sitting  Sit in chairs with straight backs or low-back support. Keep your knees lower than your hips, with your feet flat on the floor.  When driving, sit up straight. Adjust the seat forward so you are not leaning toward the steering wheel.  A small pillow or rolled towel behind your lower back may help if you are driving long distances.   Standing  When standing for long periods, shift most of your weight to one leg at a time. Alternate legs every few minutes.   Sleeping  The best way to sleep is on your side with your knees bent. Put a low pillow under your head to support your neck in a neutral spine position. Avoid thick pillows that bend your neck to one side. Put a pillow between your legs to further relax your lower back. If you sleep on your back, put pillows  under your knees to support your legs in a slightly flexed position. Use a firm mattress. If your mattress sags, replace it, or use a 1/2-inch plywood board under the mattress to add support.  Follow-up care  Follow up with your healthcare provider, or as advised.  If X-rays, a CT scan or an MRI scan were taken, they will be reviewed by a radiologist. You will be notified of any new findings that may affect your care.  Call 911  Seek emergency medical care if any of the following occur:  · Trouble breathing  · Confusion  · Very drowsy  · Fainting or loss of consciousness  · Rapid or very slow heart rate  · Loss of  bowel or bladder control  When to seek medical care  Call your healthcare provider if any of the following occur:  · Pain becomes worse or spreads to your arms or legs  · Weakness or numbness in one or both arms or legs  · Numbness in the groin area  Date Last Reviewed: 6/1/2016  © 2216-5079 [employer]. [address] [place] [address]. All rights reserved. This information is not intended as a substitute for professional medical care. Always follow your healthcare professional's instructions.        Relieving Back Pain  Back pain is a common problem. You can strain back muscles by lifting too much weight or just by moving the wrong way. Back strain can be uncomfortable, even painful. And it can take weeks or months to improve. To help yourself feel better and prevent future back strains, try these tips.  Important Note: Do not give aspirin to children or teens without first discussing it with your healthcare provider.      ? Ice    Ice reduces muscle pain and swelling. It helps most during the first 24 to 48 hours after an injury.  · Wrap an ice pack or a bag of frozen peas in a thin towel. (Never place ice directly on your skin.)  · Place the ice where your back hurts the most.  · Dont ice for more than 20 minutes at a time.  · You can use ice several times a  day.  ? Medicines  Over-the-counter pain relievers can include acetaminophen and anti-inflammatory medicines, which includes aspirin or ibuprofen. They can help ease discomfort. Some also reduce swelling.  · Tell your healthcare provider about any medicines you are already taking.  · Take medicines only as directed.  ? Heat  After the first 48 hours, heat can relax sore muscles and improve blood flow.  · Try a warm bath or shower. Or use a heating pad set on low. To prevent a burn, keep a cloth between you and the heating pad.  · Dont use a heating pad for more than 15 minutes at a time. Never sleep on a heating pad.  Date Last Reviewed: 9/1/2015  © 2822-4940 [employer]. [address] [place] [address], [place] [address]. All rights reserved. This information is not intended as a substitute for professional medical care. Always follow your healthcare professional's instructions.        Back Safety: Basics of Good Posture  Good posture helps protect you from injury. It also increases your comfort. Aim for good posture throughout the day.    Check your posture  The human body works best when it is properly aligned. To improve your standing posture, follow these steps:  · Take a moment to close your eyes and feel your body. Then breathe deeply and relax your shoulders, hips, and knees.  · Now, from the very top of your head, lift up just a bit. Think of a line linking your ears, shoulders, hips, and ankles. Adjust your body to follow the line. You may need to relax your hips and tuck your buttocks under a bit.  · Next, take a look at yourself in a mirror. Is one ear, shoulder, or hip higher than the other? They should be level.  Check how you sit  When you sit properly, pressure on your back is reduced. Try these steps:  · Sit so that the curve of your lower back fits easily against the chair. Keep your gaze level.  · Support your feet. They should be flat on the floor or on a footrest. Your knees should be  level with your hips.  · Adjust the chair height as needed. Sit so your forearms are level with the work surface.  Proper posture helps  When your back is aligned, its more likely to stay safe throughout the day.  · Standing in place. Rest one foot on a stool or low box to ease pressure on your lower back. Switch feet often. If you can, adjust the height of your work surface so your neck and shoulders arent under strain.  · Driving. Sit close enough to the steering wheel to keep your knees slightly bent. For comfort, your knees should be level with your hips or just a bit lower. Sit as straight as you can. The curve of your lower back should be fully supported.  · Walking. Stand tall and walk with your head up. Let your arms swing while you walk. This helps relax muscles. Wear shoes that fit and support your feet. If you will be standing or walking for a long time, dont wear high heels.  · Sitting and sleeping. Choose your furniture with care. Make sure its not causing or increasing your back pain. Chairs should allow for comfortable, correct sitting posture. Use pillows for added support if needed. Your bed should support your backs natural curves without being too hard or too soft.  Date Last Reviewed: 10/18/2015  © 0913-3554 [email or web address]. [address] [place] [address], [place] [address]. All rights reserved. This information is not intended as a substitute for professional medical care. Always follow your healthcare professional's instructions.        Back Safety: Poor Posture Hurts  An unhealthy spine often starts with bad habits. Poor movement patterns and posture problems are common causes of back pain. Disk, bone, nerve, and soft tissue problems can all be affected by poor posture. They can lead to pain, stiffness, and other symptoms.    Poor posture backfires  Poor posture can cause pain. Too much slouching puts increased pressure on the disks. An excessive lumbar curve can overload and inflame  the vertebrae. As a result, the back muscles may tighten or spasm to splint and protect the spine. This adds to the pain you feel.    Proper posture: The key to safe movement  Your spine bears your weight throughout the day. This is true whether youre sleeping, standing, or bending. Certain positions strain your spine more than others. But by maintaining proper posture in all positions, you can reduce the stress on your spine.       To improve your standing posture, follow these steps:  · Breathe deeply.  · Relax your shoulders, hips, and ankles. · Think of the ears, shoulders, hips, and ankles as a series of dots. Now, adjust your body to connect the dots in a straight line.  · Tuck your buttocks in just a bit if you need to.      Date Last Reviewed: 10/28/2015  © 0529-6197 [employer]. [address] [place] [address]. All rights reserved. This information is not intended as a substitute for professional medical care. Always follow your healthcare professional's instructions.        Back Exercises: Hip Lift    To start, lie on your back with your knees bent and feet flat on the floor. Dont press your neck or lower back to the floor. Breathe deeply. You should feel comfortable and relaxed in this position:  · Tighten your abdomen and buttocks.  · Slowly raise your hips upward. Be careful not to arch your back.  · Hold for 5 seconds. Lower your hips to the floor.  · Repeat 10 times.  For your safety, check with your healthcare provider before starting an exercise program.   Date Last Reviewed: 8/16/2015  © 2180-6654 [employer]. [address] [place] [address]. All rights reserved. This information is not intended as a substitute for professional medical care. Always follow your healthcare professional's instructions.        Eating to Prevent Gout  Gout is a painful form of arthritis caused by an excess of uric acid. This is a waste product made by the body. It  builds up in the body and forms crystals that collect in the joints, bringing on a gout attack. Alcohol and certain foods can trigger a gout attack. Below are some guidelines for changing your diet to help you manage gout. Your healthcare provider can work with you to determine the best eating plan for you. Know that diet is only one part of managing gout. Take your medicines as prescribed and follow the other guidelines your healthcare provider has given you.  Foods to limit  Eating too many foods containing purines may increase the levels of uric acid in your body and increase your risk for a gout attack. It may be best to limit these high-purine foods:  · Alcohol (beer, red wine). You may be told to avoid alcohol completely.  · Certain fish (anchovies, sardines, fish roes, herring, tuna, mussels, codfish, scallops, trout, and kushal)  · Certain meats (red meat, processed meat, gupta, turkey, wild game, and goose)  · Sauces and gravies made with meat  · Organ meats (such as liver, kidneys, sweetbreads, and tripe)  · Legumes (such as dried beans, peas)  · Mushrooms, spinach, asparagus, and cauliflower  · Yeast and yeast extract supplements  Foods to try  Some foods may be helpful for people with gout. You may want to try adding some of the following foods to your diet:  · Dark berries: These include blueberries, blackberries, and cherries. These berries contain chemicals that may lower uric acid.  · Tofu: Tofu, which is made from soy, is a good source of protein. Studies have shown that it may be a better choice than meat for people with gout.  · Omega fatty acids: These acids are found in fatty fish (such as salmon), certain oils (such as flax, olive, or nut oils), or nuts. They may help prevent inflammation due to gout.  The following guidelines are recommended by the American Medical Association for people with gout. Your diet should be:  · High in fiber, whole grains, fruits, and vegetables.  · Low in protein  (15% of calories should come from protein. Choose lean sources such as soy, lean meats, and poultry).  · Low in fat (no more than 30% of calories should come from fat, with only 10% coming from animal fat).   Date Last Reviewed: 6/17/2015  © 6954-5091 [employer]. [address] [place] [address], [place] [address]. All rights reserved. This information is not intended as a substitute for professional medical care. Always follow your healthcare professional's instructions.        Gout Diet  Gout is a painful condition caused by an excess of uric acid, a waste product made by the body. Uric acid forms crystals that collect in the joints. The immune response to these crystals brings on symptoms of joint pain and swelling. This is called a gout attack. Often, medications and diet changes are combined to manage gout. Below are some guidelines for changing your diet to help you manage gout and prevent attacks. Your health care provider will help you determine the best eating plan for you.     Eating to manage gout  Weight loss for those who are overweight may help reduce gout attacks.  Eat less of these foods  Eating too many foods containing purines may raise the levels of uric acid in your body. This raises your risk for a gout attack. Try to limit these foods and drinks:  · Alcohol, such as beer and red wine. You may be told to avoid alcohol completely.  · Soft drinks that contain sugar or high fructose corn syrup  · Certain fish, including anchovies, sardines, fish eggs, and herring  · Shellfish  · Certain meats, such as red meat, hot dogs, luncheon meats, and turkey  · Organ meats, such as liver, kidneys, and sweetbreads  · Legumes, such as dried beans and peas  · Other high fat foods such as gravy, whole milk, and high fat cheeses  · Vegetables such as asparagus, cauliflower, spinach, and mushrooms used to be thought to contribute to an increased risk for a gout attack, but recent studies show that high purine  vegetables don't increase the risk for a gout attack.  Eat more of these foods  Other foods may be helpful for people with gout. Add some of these foods to your diet:  · Cherries contain chemicals that may lower uric acid.  · Omega fatty acids. These are found in some fatty fish such as salmon, certain oils (flax, olive, or nut), and nuts themselves. Omega fatty acids may help prevent inflammation due to gout.  · Dairy products that are low-fat or fat-free, such as cheese and yogurt  · Complex carbohydrate foods, including whole grains, brown rice, oats, and beans  · Coffee, in moderation  · Water, approximately 64 ounces per day  Follow-up care  Follow up with your healthcare provider as advised.  When to seek medical advice  Call your healthcare provider right away if any of these occur:  · Return of gout symptoms, usually at night:  · Severe pain, swelling, and heat in a joint, especially the base of the big toe  · Affected joint is hard to move  · Skin of the affected joint is purple or red  · Fever of 100.4°F (38°C) or higher  · Pain that doesn't get better even with prescribed medicine   Date Last Reviewed: 1/12/2016  © 7497-7969 [email or web address]. [address] [place] [address], [place] [address]. All rights reserved. This information is not intended as a substitute for professional medical care. Always follow your healthcare professional's instructions.

## 2020-04-06 ENCOUNTER — OFFICE VISIT (OUTPATIENT)
Dept: FAMILY MEDICINE | Facility: CLINIC | Age: 45
End: 2020-04-06
Payer: COMMERCIAL

## 2020-04-06 VITALS
HEART RATE: 86 BPM | BODY MASS INDEX: 35.65 KG/M2 | HEIGHT: 73 IN | WEIGHT: 269 LBS | SYSTOLIC BLOOD PRESSURE: 150 MMHG | OXYGEN SATURATION: 96 % | TEMPERATURE: 98 F | DIASTOLIC BLOOD PRESSURE: 70 MMHG

## 2020-04-06 DIAGNOSIS — R05.9 COUGH: Primary | ICD-10-CM

## 2020-04-06 DIAGNOSIS — R09.82 POST-NASAL DRAINAGE: ICD-10-CM

## 2020-04-06 PROCEDURE — 3078F PR MOST RECENT DIASTOLIC BLOOD PRESSURE < 80 MM HG: ICD-10-PCS | Mod: S$GLB,,, | Performed by: NURSE PRACTITIONER

## 2020-04-06 PROCEDURE — 99213 OFFICE O/P EST LOW 20 MIN: CPT | Mod: S$GLB,,, | Performed by: NURSE PRACTITIONER

## 2020-04-06 PROCEDURE — 3008F PR BODY MASS INDEX (BMI) DOCUMENTED: ICD-10-PCS | Mod: S$GLB,,, | Performed by: NURSE PRACTITIONER

## 2020-04-06 PROCEDURE — 3077F SYST BP >= 140 MM HG: CPT | Mod: S$GLB,,, | Performed by: NURSE PRACTITIONER

## 2020-04-06 PROCEDURE — 3008F BODY MASS INDEX DOCD: CPT | Mod: S$GLB,,, | Performed by: NURSE PRACTITIONER

## 2020-04-06 PROCEDURE — 99213 PR OFFICE/OUTPT VISIT, EST, LEVL III, 20-29 MIN: ICD-10-PCS | Mod: S$GLB,,, | Performed by: NURSE PRACTITIONER

## 2020-04-06 PROCEDURE — 3077F PR MOST RECENT SYSTOLIC BLOOD PRESSURE >= 140 MM HG: ICD-10-PCS | Mod: S$GLB,,, | Performed by: NURSE PRACTITIONER

## 2020-04-06 PROCEDURE — 3078F DIAST BP <80 MM HG: CPT | Mod: S$GLB,,, | Performed by: NURSE PRACTITIONER

## 2020-04-06 RX ORDER — FLUTICASONE PROPIONATE 50 MCG
1 SPRAY, SUSPENSION (ML) NASAL 2 TIMES DAILY
Qty: 16 G | Refills: 1 | Status: SHIPPED | OUTPATIENT
Start: 2020-04-06 | End: 2020-05-06

## 2020-04-06 RX ORDER — BENZONATATE 100 MG/1
200 CAPSULE ORAL 3 TIMES DAILY PRN
Qty: 60 CAPSULE | Refills: 0 | Status: SHIPPED | OUTPATIENT
Start: 2020-04-06 | End: 2020-04-16

## 2020-04-06 NOTE — PROGRESS NOTES
Subjective:       Patient ID: Jose Fox is a 45 y.o. male.    Chief Complaint: Cough    Cough   This is a new problem. The current episode started 1 to 4 weeks ago. The problem has been gradually improving. The problem occurs every few minutes. The cough is non-productive. Associated symptoms include nasal congestion, postnasal drip and rhinorrhea. Pertinent negatives include no chest pain, chills, ear congestion, ear pain, fever, headaches, heartburn, hemoptysis, myalgias, rash, sore throat, shortness of breath, sweats, weight loss or wheezing. The symptoms are aggravated by lying down. He has tried rest for the symptoms. The treatment provided no relief.   Sinus Problem   This is a new problem. The current episode started 1 to 4 weeks ago. The problem has been gradually improving since onset. There has been no fever. He is experiencing no pain. Associated symptoms include congestion and coughing. Pertinent negatives include no chills, diaphoresis, ear pain, headaches, hoarse voice, neck pain, shortness of breath, sinus pressure, sneezing, sore throat or swollen glands. Past treatments include saline sprays and sitting up. The treatment provided mild relief.     Review of Systems   Constitutional: Positive for activity change. Negative for appetite change, chills, diaphoresis, fatigue, fever, unexpected weight change and weight loss.        Obesity   HENT: Positive for congestion, postnasal drip and rhinorrhea. Negative for ear discharge, ear pain, hearing loss, hoarse voice, sinus pressure, sneezing, sore throat, trouble swallowing and voice change.    Eyes: Negative for photophobia, pain and visual disturbance.   Respiratory: Positive for cough. Negative for hemoptysis, chest tightness, shortness of breath and wheezing.    Cardiovascular: Negative for chest pain, palpitations and leg swelling.   Gastrointestinal: Negative for abdominal pain, constipation, diarrhea, heartburn, nausea and vomiting.    Endocrine: Negative for cold intolerance and heat intolerance.   Genitourinary: Negative for difficulty urinating, dysuria and flank pain.   Musculoskeletal: Negative for arthralgias, gait problem, myalgias and neck pain.   Skin: Negative for rash.   Allergic/Immunologic: Negative for immunocompromised state.   Neurological: Negative for dizziness, weakness and headaches.   Hematological: Negative for adenopathy.   Psychiatric/Behavioral: Negative for agitation, confusion, self-injury and suicidal ideas.       Past Medical History:   Diagnosis Date    Gout     Hypertension     Thyroid disease       Past Surgical History:   Procedure Laterality Date    LEG SURGERY         Family History   Problem Relation Age of Onset    Hypertension Mother     Hypertension Father     Diabetes Father        Social History     Socioeconomic History    Marital status: Single     Spouse name: Not on file    Number of children: 3    Years of education: Not on file    Highest education level: Not on file   Occupational History    Occupation: [employer]      Comment: [employer]   Social Needs    Financial resource strain: Not on file    Food insecurity:     Worry: Not on file     Inability: Not on file    Transportation needs:     Medical: Not on file     Non-medical: Not on file   Tobacco Use    Smoking status: Never Smoker    Smokeless tobacco: Never Used   Substance and Sexual Activity    Alcohol use: Never     Frequency: Never    Drug use: Never    Sexual activity: Yes     Partners: Female   Lifestyle    Physical activity:     Days per week: Not on file     Minutes per session: Not on file    Stress: Only a little   Relationships    Social connections:     Talks on phone: Not on file     Gets together: Not on file     Attends Religion service: Not on file     Active member of club or organization: Not on file     Attends meetings of clubs or organizations: Not on file     Relationship status: Not on file  "  Other Topics Concern    Not on file   Social History Narrative    23.18    25        Current Outpatient Medications   Medication Sig Dispense Refill    colchicine (COLCRYS) 0.6 mg tablet Take 1 tablet (0.6 mg total) by mouth once daily. 14 tablet 0    escitalopram oxalate (LEXAPRO) 10 MG tablet Take 1.5 tablets (15 mg total) by mouth once daily. 45 tablet 5    febuxostat (ULORIC) 40 mg Tab Take 1 tablet (40 mg total) by mouth once daily. 90 tablet 1    indomethacin (INDOCIN) 50 MG capsule Take 1 capsule (50 mg total) by mouth 3 (three) times daily with meals. 30 capsule 2    losartan (COZAAR) 50 MG tablet Take 1 tablet (50 mg total) by mouth once daily. 30 tablet 1    methIMAzole (TAPAZOLE) 10 MG Tab Take 1 tablet (10 mg total) by mouth once daily. 30 tablet 1    benzonatate (TESSALON) 100 MG capsule Take 2 capsules (200 mg total) by mouth 3 (three) times daily as needed. 60 capsule 0    fluticasone propionate (FLONASE) 50 mcg/actuation nasal spray 1 spray (50 mcg total) by Each Nostril route 2 (two) times daily. 16 g 1     No current facility-administered medications for this visit.        Review of patient's allergies indicates:   Allergen Reactions    Allopurinol analogues Other (See Comments)     Abdominal cramps     Objective:      Blood pressure (!) 150/70, pulse 86, temperature 97.8 °F (36.6 °C), height 6' 1" (1.854 m), weight 122 kg (269 lb), SpO2 96 %. Body mass index is 35.49 kg/m².   Physical Exam   Constitutional: He is oriented to person, place, and time. He appears well-developed.   Obesity   HENT:   Head: Normocephalic and atraumatic.   Right Ear: Ear canal normal. A middle ear effusion is present.   Left Ear: Ear canal normal. A middle ear effusion is present.   Nose: Mucosal edema present.   Mouth/Throat: Uvula is midline and mucous membranes are normal. Oropharyngeal exudate (Clear postnasal drainage noted) present. No posterior oropharyngeal erythema.   Eyes: Pupils are equal, round, " and reactive to light. Conjunctivae, EOM and lids are normal.   Neck: Normal range of motion. Neck supple.   Cardiovascular: Normal rate, regular rhythm, S1 normal, S2 normal, normal heart sounds, intact distal pulses and normal pulses.   No murmur heard.  Pulmonary/Chest: Effort normal and breath sounds normal. No respiratory distress.   Abdominal: Soft. Bowel sounds are normal. There is no tenderness.   Musculoskeletal: Normal range of motion.   Lymphadenopathy:     He has no cervical adenopathy.        Right cervical: No superficial cervical adenopathy present.       Left cervical: No superficial cervical adenopathy present.   Neurological: He is alert and oriented to person, place, and time.   Skin: Skin is warm and dry. No rash noted.   Psychiatric: He has a normal mood and affect. His speech is normal and behavior is normal. Judgment and thought content normal.   Nursing note and vitals reviewed.          Assessment:       1. Cough    2. Post-nasal drainage        Plan:       Jose was seen today for cough.    Diagnoses and all orders for this visit:    Cough  -     benzonatate (TESSALON) 100 MG capsule; Take 2 capsules (200 mg total) by mouth 3 (three) times daily as needed.    Post-nasal drainage  -     fluticasone propionate (FLONASE) 50 mcg/actuation nasal spray; 1 spray (50 mcg total) by Each Nostril route 2 (two) times daily.       Patient may return to work.  Note provided.

## 2020-04-06 NOTE — LETTER
April 6, 2020      [facility]  [address]  [facility] [address]  Phone: [phone]  Fax: [phone]       Patient: Jose Fox   YOB: 1975  Date of Visit: 04/06/2020    To Whom It May Concern:    Sanjeev Fox  was at [facility] on 04/06/2020. He may return to work/school on 04/07/2020 with no restrictions. If you have any questions or concerns, or if I can be of further assistance, please do not hesitate to contact me.    Sincerely,    JENNIFER Torres

## 2020-04-13 DIAGNOSIS — E05.90 HYPERTHYROIDISM: ICD-10-CM

## 2020-04-13 DIAGNOSIS — I10 ESSENTIAL HYPERTENSION: ICD-10-CM

## 2020-04-13 RX ORDER — METHIMAZOLE 10 MG/1
TABLET ORAL
Qty: 30 TABLET | Refills: 5 | Status: SHIPPED | OUTPATIENT
Start: 2020-04-13 | End: 2021-10-29

## 2020-04-13 RX ORDER — LOSARTAN POTASSIUM 50 MG/1
TABLET ORAL
Qty: 30 TABLET | Refills: 5 | Status: SHIPPED | OUTPATIENT
Start: 2020-04-13 | End: 2020-05-29 | Stop reason: SDUPTHER

## 2020-04-29 DIAGNOSIS — M19.90 ARTHRITIS: ICD-10-CM

## 2020-04-29 RX ORDER — IBUPROFEN 800 MG/1
TABLET ORAL
Qty: 60 TABLET | Refills: 0 | Status: SHIPPED | OUTPATIENT
Start: 2020-04-29 | End: 2020-05-27 | Stop reason: SDUPTHER

## 2020-05-06 ENCOUNTER — OFFICE VISIT (OUTPATIENT)
Dept: FAMILY MEDICINE | Facility: CLINIC | Age: 45
End: 2020-05-06
Payer: COMMERCIAL

## 2020-05-06 ENCOUNTER — LAB VISIT (OUTPATIENT)
Dept: LAB | Facility: HOSPITAL | Age: 45
End: 2020-05-06
Attending: INTERNAL MEDICINE
Payer: COMMERCIAL

## 2020-05-06 ENCOUNTER — PATIENT MESSAGE (OUTPATIENT)
Dept: FAMILY MEDICINE | Facility: CLINIC | Age: 45
End: 2020-05-06

## 2020-05-06 VITALS
TEMPERATURE: 99 F | HEART RATE: 80 BPM | BODY MASS INDEX: 34.99 KG/M2 | DIASTOLIC BLOOD PRESSURE: 88 MMHG | SYSTOLIC BLOOD PRESSURE: 151 MMHG | WEIGHT: 264 LBS | HEIGHT: 73 IN

## 2020-05-06 DIAGNOSIS — M10.9 GOUT, UNSPECIFIED CAUSE, UNSPECIFIED CHRONICITY, UNSPECIFIED SITE: ICD-10-CM

## 2020-05-06 DIAGNOSIS — K42.9 UMBILICAL HERNIA WITHOUT OBSTRUCTION AND WITHOUT GANGRENE: ICD-10-CM

## 2020-05-06 DIAGNOSIS — E05.90 HYPERTHYROIDISM: ICD-10-CM

## 2020-05-06 DIAGNOSIS — I10 ESSENTIAL HYPERTENSION: ICD-10-CM

## 2020-05-06 DIAGNOSIS — M10.071 IDIOPATHIC GOUT INVOLVING TOE OF RIGHT FOOT, UNSPECIFIED CHRONICITY: ICD-10-CM

## 2020-05-06 DIAGNOSIS — I10 ESSENTIAL HYPERTENSION: Primary | ICD-10-CM

## 2020-05-06 DIAGNOSIS — R10.33 PERIUMBILICAL PAIN: ICD-10-CM

## 2020-05-06 LAB
ALBUMIN SERPL BCP-MCNC: 4.1 G/DL (ref 3.5–5.2)
ALP SERPL-CCNC: 68 U/L (ref 55–135)
ALT SERPL W/O P-5'-P-CCNC: 43 U/L (ref 10–44)
ANION GAP SERPL CALC-SCNC: 5 MMOL/L (ref 8–16)
AST SERPL-CCNC: 41 U/L (ref 10–40)
BASOPHILS # BLD AUTO: 0.08 K/UL (ref 0–0.2)
BASOPHILS NFR BLD: 1 % (ref 0–1.9)
BILIRUB SERPL-MCNC: 0.9 MG/DL (ref 0.1–1)
BUN SERPL-MCNC: 16 MG/DL (ref 6–20)
CALCIUM SERPL-MCNC: 9.1 MG/DL (ref 8.7–10.5)
CHLORIDE SERPL-SCNC: 110 MMOL/L (ref 95–110)
CO2 SERPL-SCNC: 26 MMOL/L (ref 23–29)
CREAT SERPL-MCNC: 1.1 MG/DL (ref 0.5–1.4)
DIFFERENTIAL METHOD: ABNORMAL
EOSINOPHIL # BLD AUTO: 0.3 K/UL (ref 0–0.5)
EOSINOPHIL NFR BLD: 3.1 % (ref 0–8)
ERYTHROCYTE [DISTWIDTH] IN BLOOD BY AUTOMATED COUNT: 13 % (ref 11.5–14.5)
EST. GFR  (AFRICAN AMERICAN): >60 ML/MIN/1.73 M^2
EST. GFR  (NON AFRICAN AMERICAN): >60 ML/MIN/1.73 M^2
GLUCOSE SERPL-MCNC: 109 MG/DL (ref 70–110)
HCT VFR BLD AUTO: 43.9 % (ref 40–54)
HGB BLD-MCNC: 14.7 G/DL (ref 14–18)
IMM GRANULOCYTES # BLD AUTO: 0.06 K/UL (ref 0–0.04)
IMM GRANULOCYTES NFR BLD AUTO: 0.7 % (ref 0–0.5)
LYMPHOCYTES # BLD AUTO: 2.3 K/UL (ref 1–4.8)
LYMPHOCYTES NFR BLD: 27.5 % (ref 18–48)
MCH RBC QN AUTO: 29.9 PG (ref 27–31)
MCHC RBC AUTO-ENTMCNC: 33.5 G/DL (ref 32–36)
MCV RBC AUTO: 89 FL (ref 82–98)
MONOCYTES # BLD AUTO: 0.7 K/UL (ref 0.3–1)
MONOCYTES NFR BLD: 8.7 % (ref 4–15)
NEUTROPHILS # BLD AUTO: 4.9 K/UL (ref 1.8–7.7)
NEUTROPHILS NFR BLD: 59 % (ref 38–73)
NRBC BLD-RTO: 0 /100 WBC
PLATELET # BLD AUTO: 236 K/UL (ref 150–350)
PMV BLD AUTO: 9.9 FL (ref 9.2–12.9)
POTASSIUM SERPL-SCNC: 3.6 MMOL/L (ref 3.5–5.1)
PROT SERPL-MCNC: 7.5 G/DL (ref 6–8.4)
RBC # BLD AUTO: 4.91 M/UL (ref 4.6–6.2)
SODIUM SERPL-SCNC: 141 MMOL/L (ref 136–145)
URATE SERPL-MCNC: 6.7 MG/DL (ref 3.4–7)
WBC # BLD AUTO: 8.37 K/UL (ref 3.9–12.7)

## 2020-05-06 PROCEDURE — 3079F PR MOST RECENT DIASTOLIC BLOOD PRESSURE 80-89 MM HG: ICD-10-PCS | Mod: S$GLB,,, | Performed by: INTERNAL MEDICINE

## 2020-05-06 PROCEDURE — 3077F SYST BP >= 140 MM HG: CPT | Mod: S$GLB,,, | Performed by: INTERNAL MEDICINE

## 2020-05-06 PROCEDURE — 3008F PR BODY MASS INDEX (BMI) DOCUMENTED: ICD-10-PCS | Mod: S$GLB,,, | Performed by: INTERNAL MEDICINE

## 2020-05-06 PROCEDURE — 99214 PR OFFICE/OUTPT VISIT, EST, LEVL IV, 30-39 MIN: ICD-10-PCS | Mod: S$GLB,,, | Performed by: INTERNAL MEDICINE

## 2020-05-06 PROCEDURE — 3079F DIAST BP 80-89 MM HG: CPT | Mod: S$GLB,,, | Performed by: INTERNAL MEDICINE

## 2020-05-06 PROCEDURE — 84550 ASSAY OF BLOOD/URIC ACID: CPT

## 2020-05-06 PROCEDURE — 80053 COMPREHEN METABOLIC PANEL: CPT

## 2020-05-06 PROCEDURE — 85025 COMPLETE CBC W/AUTO DIFF WBC: CPT

## 2020-05-06 PROCEDURE — 36415 COLL VENOUS BLD VENIPUNCTURE: CPT

## 2020-05-06 PROCEDURE — 3008F BODY MASS INDEX DOCD: CPT | Mod: S$GLB,,, | Performed by: INTERNAL MEDICINE

## 2020-05-06 PROCEDURE — 99214 OFFICE O/P EST MOD 30 MIN: CPT | Mod: S$GLB,,, | Performed by: INTERNAL MEDICINE

## 2020-05-06 PROCEDURE — 3077F PR MOST RECENT SYSTOLIC BLOOD PRESSURE >= 140 MM HG: ICD-10-PCS | Mod: S$GLB,,, | Performed by: INTERNAL MEDICINE

## 2020-05-06 NOTE — Clinical Note
Dear Dr Simeon you kindly accommodate this gentleman earlier for on and off periumbilical pain with underlying umbilical hernia.  He states that sometimes the belly button gets stone akosua.  He had appointment with you earlier but was possibly postpone due to COVID-19 pandemic environment.Please let me know if you plan to do a CT scan at least with contrast.  He has slightly abnormal renal functions and I am checking it again.Kind regardsDr. Renee MAHONEY

## 2020-05-06 NOTE — PATIENT INSTRUCTIONS
Taking Your Blood Pressure  Blood pressure is the force of blood against the artery wall as it moves from the heart through the blood vessels. You can take your own blood pressure reading using a digital monitor. Take your readings the same each time, using the same arm. Take readings as often as your healthcare provider instructs.  About blood pressure monitors  Blood pressure monitors are designed for certain ages and cases. You can find monitors for older adults, for pregnant women, and for children. Make sure the one you choose is the right one for your age and situation.  The American Heart Association recommends an automatic cuff monitor that fits on your upper arm (bicep). The cuff should fit your arm size. A cuff thats too large or too small will not give an accurate reading. Measure around your upper arm to find your size.  Monitors that attach to your finger or wrist are not as accurate as monitors for your upper arm.  Ask your healthcare provider for help in choosing a monitor. Bring your monitor to your next provider visit if you need help in using it the correct way.  The steps below are general instructions for using an automatic digital monitor.  Step 1. Relax    · Take your blood pressure at the same time every day, such as in the morning or evening, or at the time your healthcare provider recommends.  · Wait at least a half-hour after smoking, eating, or exercising. Don't drink coffee, tea, soda, or other caffeinated beverages before checking your blood pressure.  · Sit comfortably at a table with both feet on the floor. Do not cross your legs or feet. Place the monitor near you.  · Rest for a few minutes before you begin.  Step 2. Wrap the cuff    · Place your arm on the table, palm up. Your arm should be at the level of your heart. Wrap the cuff around your upper arm, just above your elbow. Its best done on bare skin, not over clothing. Most cuffs will indicate where the brachial artery (the  blood vessel in the middle of the arm at the inner side of the elbow) should line up with the cuff. Look in your monitor's instruction booklet for an illustration. You can also bring your cuff to your healthcare provider and have them show you how to correctly place the cuff.  Step 3. Inflate the cuff    · Push the button that starts the pump.  · The cuff will tighten, then loosen.  · The numbers will change. When they stop changing, your blood pressure reading will appear.  · Take 2 or 3 readings one minute apart.  Step 4. Write down the results of each reading    · Write down your blood pressure numbers for each reading. Note the date and time. Keep your results in one place, such as a notebook. Even if your monitor has a built-in memory, keep a hard copy of the readings.  · Remove the cuff from your arm. Turn off the machine.  · Bring your blood pressure records with your healthcare providers at each visit.  · If you start a new blood pressure medicine, note the day you started the new medicine. Also note the day if you change the dose of your medicine. This information goes on your blood pressure recording sheet. This will help your healthcare provider monitor how well the medicine changes are working.  · Ask your healthcare provider what numbers should prompt you to call him or her. Also ask what numbers should prompt you to get help right away.  Date Last Reviewed: 11/1/2016 © 2000-2017 [employer]. [address] [place] [id number]. All rights reserved. This information is not intended as a substitute for professional medical care. Always follow your healthcare professional's instructions.      Guidelines for General Prevention of COVID-19    o Take steps to protect yourself from COVID-19. Perform hand hygiene frequently. Wash your hands often with soap and water for at least 20 seconds of use and alcohol-based hand , covering all surfaces of your hands and rubbing them  together until they feel dry.  o Avoid touching your eyes, nose, and mouth with unwashed hands.  o Avoid close contact with people and stay home if youre sick, except to get medical care.   o Cover coughs and sneezes with a tissue, or use the inside of your elbow. Immediately wash your hands or use hand .     For more information, see CDC link below:    https://www.cdc.gov/coronavirus/2019-ncov/hcp/guidance-prevent-spread.html#precautions

## 2020-05-06 NOTE — PROGRESS NOTES
"Subjective:       Patient ID: Jose Fox is a 45 y.o. male.    Chief Complaint: Back Pain; Gout; Hernia; Hypertension; and Thyroid Problem    Mr. Sylvester Fox is a 45-year-old gentleman who comes for follow-up.  History of gout has been noted.  He used to be [facility] but apparently the cost of this medication prevents him from accepting it.  It is not easily covered by his insurance.  Hence he is taking colchicine more or less every day and as a backup he might take some indomethacin.  Recently he has not had attack of gout.    Blood pressures are slightly elevated.  He is taking losartan 50 mg of blood pressure.    Due to currently prevailing COVID-19 pandemic he is off work and staying mostly at home.  He works as a  at the [employer] in [facility].    He has been experiencing periumbilical pain on and off which is more frequent.  Sometimes it becomes "stone hard".  He has not taken a picture of it.  We had set up an appointment for him with general surgery but due to COVID-19 pandemic probably it is delayed.    He also complains of low back pain which waxes and wanes.  Not much series today.  No numbness or paresthesias in the legs.    Last time we had observed that his renal functions were slightly abnormal.  I would like to repeat his kidney tests again.        Back Pain   This is a recurrent problem. The current episode started more than 1 year ago. The problem occurs constantly. The problem has been waxing and waning since onset. The pain is moderate. Associated symptoms include abdominal pain. Pertinent negatives include no chest pain, dysuria, fever, headaches, numbness, weakness or weight loss. Risk factors include lack of exercise, sedentary lifestyle and obesity.   Abdominal Pain   This is a recurrent problem. The current episode started more than 1 month ago. The onset quality is gradual. The problem occurs every several days. The problem has been gradually worsening. The pain is located " in the epigastric region. The pain is at a severity of 8/10. The pain is severe. The quality of the pain is aching. The abdominal pain radiates to the periumbilical region. Associated symptoms include arthralgias and myalgias. Pertinent negatives include no anorexia, belching, constipation, diarrhea, dysuria, fever, flatus, frequency, headaches, hematochezia, hematuria, melena, nausea, vomiting or weight loss. The pain is aggravated by movement. The pain is relieved by nothing. He has tried nothing for the symptoms. The treatment provided no relief. Prior diagnostic workup includes CT scan. His past medical history is significant for GERD. There is no history of abdominal surgery, Crohn's disease, gallstones, irritable bowel syndrome, pancreatitis, PUD or ulcerative colitis. Patient's medical history includes kidney stones. Patient's medical history does not include UTI.   Hypertension   This is a chronic problem. The current episode started more than 1 year ago. The problem is uncontrolled. Associated symptoms include malaise/fatigue. Pertinent negatives include no chest pain, headaches, palpitations or shortness of breath. Risk factors for coronary artery disease include sedentary lifestyle, male gender and obesity. Past treatments include angiotensin blockers. The current treatment provides moderate improvement. Compliance problems include psychosocial issues.  Identifiable causes of hypertension include a thyroid problem.   Thyroid Problem   Presents for follow-up visit. Patient reports no anxiety, cold intolerance, constipation, diarrhea, fatigue, heat intolerance, hoarse voice, palpitations or weight loss. The symptoms have been stable.    As far as gout and hyperuricemia is concerned, this seems to be stable at this point.  It is unclear if he is really is watching his diet.  He is taking methimazole for hyperthyroidism.  Compliance and follow-up with endocrinologist is uncertain at this point.    Past  Medical History:   Diagnosis Date    Gout     Hypertension     Thyroid disease      Social History     Socioeconomic History    Marital status: Single     Spouse name: Not on file    Number of children: 3    Years of education: Not on file    Highest education level: Not on file   Occupational History    Occupation: [employer]      Comment: [employer]   Social Needs    Financial resource strain: Somewhat hard    Food insecurity:     Worry: Sometimes true     Inability: Never true    Transportation needs:     Medical: No     Non-medical: No   Tobacco Use    Smoking status: Never Smoker    Smokeless tobacco: Never Used   Substance and Sexual Activity    Alcohol use: Never     Frequency: Never     Drinks per session: Patient refused     Binge frequency: Never    Drug use: Never    Sexual activity: Yes     Partners: Female   Lifestyle    Physical activity:     Days per week: 5 days     Minutes per session: 100 min    Stress: To some extent   Relationships    Social connections:     Talks on phone: More than three times a week     Gets together: Never     Attends Gnosticist service: Not on file     Active member of club or organization: No     Attends meetings of clubs or organizations: Never     Relationship status: Living with partner   Other Topics Concern    Not on file   Social History Narrative    23.18    25      Past Surgical History:   Procedure Laterality Date    LEG SURGERY       Family History   Problem Relation Age of Onset    Hypertension Mother     Hypertension Father     Diabetes Father        Review of Systems   Constitutional: Positive for malaise/fatigue. Negative for activity change, appetite change, fatigue, fever, unexpected weight change and weight loss.   HENT: Negative for congestion, hoarse voice, sneezing and trouble swallowing.    Eyes: Negative for pain and visual disturbance.   Respiratory: Negative for cough, choking, chest tightness and shortness of breath.   "  Cardiovascular: Negative for chest pain, palpitations and leg swelling.        HTN-blood pressures are somewhat uncontrolled today.   Gastrointestinal: Positive for abdominal pain. Negative for abdominal distention, anorexia, blood in stool, constipation, diarrhea, flatus, hematochezia, melena, nausea and vomiting.        Periumbilical pain and sometimes the belly button turns stone hard.   Endocrine: Negative for cold intolerance, heat intolerance, polydipsia, polyphagia and polyuria.        GOUT arthritis involving the toes, elbows and other joints in past.  Family history of gouty arthritis.     Genitourinary: Negative for dysuria, frequency, hematuria and scrotal swelling.   Musculoskeletal: Positive for arthralgias, back pain and myalgias. Negative for gait problem.        Gouty arthritis.   Skin: Negative for pallor, rash and wound.   Allergic/Immunologic: Negative for environmental allergies, food allergies and immunocompromised state.   Neurological: Negative for dizziness, seizures, speech difficulty, weakness, light-headedness, numbness and headaches.   Hematological: Negative for adenopathy. Does not bruise/bleed easily.   Psychiatric/Behavioral: Positive for behavioral problems. Negative for agitation and confusion. The patient is not nervous/anxious.         Depressive anxiety-depression seems to be better with Lexapro 15 mg.    Discarding the effects of COVID 19, and his recent medical problems he rates his happiness as 7-8/10.  He is taking Lexapro.  Before taking Lexapro he was 3-4/10.         Objective:      Blood pressure (!) 151/88, pulse 80, temperature 98.6 °F (37 °C), height 6' 1" (1.854 m), weight 119.7 kg (264 lb). Body mass index is 34.83 kg/m².  Physical Exam   Constitutional: He appears well-developed.   HENT:   Head: Normocephalic and atraumatic.   Nose: Nose normal.   Mouth/Throat: Oropharynx is clear and moist. No oropharyngeal exudate.   Eyes: Conjunctivae and EOM are normal.   Neck: " Normal range of motion. Neck supple. No JVD present. No tracheal deviation present. No thyromegaly present.   Cardiovascular: Normal rate, regular rhythm and normal heart sounds. Exam reveals no gallop and no friction rub.   No murmur heard.  Pulmonary/Chest: Effort normal and breath sounds normal. No respiratory distress. He has no wheezes.   Abdominal: Soft. Bowel sounds are normal. He exhibits no distension. There is tenderness in the periumbilical area. Rigidity:  Slight bulge in the periumbilical region.  No tenderness today.       Musculoskeletal: He exhibits no tenderness or deformity.   Neurological: He is alert.   Skin: Skin is warm and dry.   Psychiatric: He has a normal mood and affect. His speech is not rapid and/or pressured. He is not agitated and not slowed. Cognition and memory are normal.   Somewhat uncomfortable and antalgic demeanor because of back pain.  Walking with a limp.   Nursing note and vitals reviewed.        Assessment:       1. Essential hypertension    2. Idiopathic gout involving toe of right foot, unspecified chronicity    3. Periumbilical pain    4. Umbilical hernia without obstruction and without gangrene    5. Hyperthyroidism           No visits with results within 3 Month(s) from this visit.   Latest known visit with results is:   Office Visit on 12/12/2019   Component Date Value Ref Range Status    TSH 12/27/2019 2.530  0.450 - 4.500 uIU/mL Final    T4, Free 12/27/2019 1.46  0.82 - 1.77 ng/dL Final    T3, Free 12/27/2019 2.9  2.0 - 4.4 pg/mL Final    WBC 12/27/2019 8.4  3.4 - 10.8 x10E3/uL Final    RBC 12/27/2019 5.18  4.14 - 5.80 x10E6/uL Final    Hemoglobin 12/27/2019 15.7  13.0 - 17.7 g/dL Final    Hematocrit 12/27/2019 46.4  37.5 - 51.0 % Final    Mean Corpuscular Volume 12/27/2019 90  79 - 97 fL Final    Mean Corpuscular Hemoglobin 12/27/2019 30.3  26.6 - 33.0 pg Final    Mean Corpuscular Hemoglobin Conc 12/27/2019 33.8  31.5 - 35.7 g/dL Final    RDW 12/27/2019  14.6  12.3 - 15.4 % Final    Platelets 12/27/2019 236  150 - 450 x10E3/uL Final    Neutrophils 12/27/2019 57  Not Estab. % Final    Lymph% 12/27/2019 31  Not Estab. % Final    Mono% 12/27/2019 7  Not Estab. % Final    Eosinophil% 12/27/2019 3  Not Estab. % Final    Basophil% 12/27/2019 1  Not Estab. % Final    Neutrophils Absolute 12/27/2019 4.9  1.4 - 7.0 x10E3/uL Final    Lymph # 12/27/2019 2.6  0.7 - 3.1 x10E3/uL Final    Mono # 12/27/2019 0.6  0.1 - 0.9 x10E3/uL Final    Eos # 12/27/2019 0.2  0.0 - 0.4 x10E3/uL Final    Baso # 12/27/2019 0.1  0.0 - 0.2 x10E3/uL Final    Immature Granulocytes 12/27/2019 1  Not Estab. % Final    Immature Grans (Abs) 12/27/2019 0.1  0.0 - 0.1 x10E3/uL Final    Glucose 12/27/2019 114* 65 - 99 mg/dL Final    BUN, Bld 12/27/2019 15  6 - 24 mg/dL Final    Creatinine 12/27/2019 1.30* 0.76 - 1.27 mg/dL Final    eGFR if non African American 12/27/2019 65  >59 mL/min/1.73 Final    eGFR if  12/27/2019 76  >59 mL/min/1.73 Final    BUN/Creatinine Ratio 12/27/2019 12  9 - 20 Final    Sodium 12/27/2019 142  134 - 144 mmol/L Final    Potassium 12/27/2019 4.7  3.5 - 5.2 mmol/L Final    Chloride 12/27/2019 105  96 - 106 mmol/L Final    CO2 12/27/2019 23  20 - 29 mmol/L Final    Calcium 12/27/2019 9.4  8.7 - 10.2 mg/dL Final    Total Protein 12/27/2019 7.5  6.0 - 8.5 g/dL Final    Albumin 12/27/2019 4.5  3.5 - 5.5 g/dL Final    Globulin, Total 12/27/2019 3.0  1.5 - 4.5 g/dL Final    Albumin/Globulin Ratio 12/27/2019 1.5  1.2 - 2.2 Final    Total Bilirubin 12/27/2019 0.9  0.0 - 1.2 mg/dL Final    Alkaline Phosphatase 12/27/2019 77  39 - 117 IU/L Final    AST 12/27/2019 25  0 - 40 IU/L Final    ALT 12/27/2019 26  0 - 44 IU/L Final    Uric Acid 12/27/2019 10.4* 3.7 - 8.6 mg/dL Final    Cholesterol 12/27/2019 174  100 - 199 mg/dL Final    Triglycerides 12/27/2019 104  0 - 149 mg/dL Final    HDL 12/27/2019 32* >39 mg/dL Final    VLDL Cholesterol Sam  12/27/2019 21  5 - 40 mg/dL Final    LDL Calculated 12/27/2019 121* 0 - 99 mg/dL Final         Plan:           Essential hypertension  -     Comprehensive metabolic panel; Future; Expected date: 05/06/2020    Idiopathic gout involving toe of right foot, unspecified chronicity  -     Uric acid; Future; Expected date: 05/06/2020    Periumbilical pain  -     Ambulatory referral/consult to General Surgery; Future; Expected date: 05/13/2020  -     CBC auto differential; Future; Expected date: 05/06/2020    Umbilical hernia without obstruction and without gangrene  -     Ambulatory referral/consult to General Surgery; Future; Expected date: 05/13/2020    Hyperthyroidism      Patient's blood pressures are elevated.  Degree of control of blood pressure at home is uncertain.    Gout symptoms are stable.    Periumbilical pain/umbilical pain which gets worse sometimes needs to be attended at this point for fear of incarceration or strangulation.  He already had a general surgery appointment which got delayed because of currently prevailing COVID-19 pandemic.    I will repeat labs again.  I will be watching for his kidney functions and I am concerned that long-term use of colchicine and indomethacin might affect his kidneys.  He could not tolerate allopurinol.  He is not very particular about monitoring his blood pressures.    I have sent a message to the general surgeon Dr. Villafuerte for an urgent appointment if possible.    Discussed patient's current medications.    I have reviewed his medications and use medications prevent uric acid formation will be again reviewed follow-up coverage will be again.    Status of thyroid medication also be reviewed again.    As mentioned above, the most important thing is to address is on and off abdominal pain with some concerns about strangulation and incarceration umbilical hernia.    Follow-up in 6 weeks.    Spent kavita 25 minutes with patient which involved review of pts medical  conditions, labs, medications and with 50% of time face-to-face discussion about medical problems, management and any applicable changes.      Current Outpatient Medications:     colchicine (COLCRYS) 0.6 mg tablet, Take 1 tablet (0.6 mg total) by mouth once daily., Disp: 14 tablet, Rfl: 0    ibuprofen (ADVIL,MOTRIN) 800 MG tablet, TAKE 1 TABLET(800 MG) BY MOUTH THREE TIMES DAILY WITH MEALS AS NEEDED FOR PAIN, Disp: 60 tablet, Rfl: 0    indomethacin (INDOCIN) 50 MG capsule, Take 1 capsule (50 mg total) by mouth 3 (three) times daily with meals., Disp: 30 capsule, Rfl: 2    losartan (COZAAR) 50 MG tablet, TAKE 1 TABLET(50 MG) BY MOUTH EVERY DAY, Disp: 30 tablet, Rfl: 5    methIMAzole (TAPAZOLE) 10 MG Tab, TAKE 1 TABLET(10 MG) BY MOUTH EVERY DAY, Disp: 30 tablet, Rfl: 5

## 2020-05-14 ENCOUNTER — OFFICE VISIT (OUTPATIENT)
Dept: SURGERY | Facility: CLINIC | Age: 45
End: 2020-05-14
Payer: COMMERCIAL

## 2020-05-14 ENCOUNTER — DOCUMENTATION ONLY (OUTPATIENT)
Dept: SURGERY | Facility: CLINIC | Age: 45
End: 2020-05-14

## 2020-05-14 VITALS
BODY MASS INDEX: 34.99 KG/M2 | TEMPERATURE: 99 F | HEIGHT: 73 IN | SYSTOLIC BLOOD PRESSURE: 139 MMHG | DIASTOLIC BLOOD PRESSURE: 86 MMHG | HEART RATE: 73 BPM | WEIGHT: 264 LBS

## 2020-05-14 DIAGNOSIS — R10.33 PERIUMBILICAL PAIN: ICD-10-CM

## 2020-05-14 DIAGNOSIS — K42.9 UMBILICAL HERNIA WITHOUT OBSTRUCTION AND WITHOUT GANGRENE: Primary | ICD-10-CM

## 2020-05-14 PROCEDURE — 99203 PR OFFICE/OUTPT VISIT, NEW, LEVL III, 30-44 MIN: ICD-10-PCS | Mod: S$GLB,,, | Performed by: SURGERY

## 2020-05-14 PROCEDURE — 99203 OFFICE O/P NEW LOW 30 MIN: CPT | Mod: S$GLB,,, | Performed by: SURGERY

## 2020-05-14 NOTE — PROGRESS NOTES
Subjective:       Patient ID: Jose Fox is a 45 y.o. male.    Chief Complaint: Other (Referred by Dr. Duran to eval poss umbilical hernia)      HPI:  Patient referred to the office with an umbilical hernia.  Patient has had the hernia for a year or 2.  Is starting to become little bit more comfortable.  He has no obstructive symptoms.  He has no history of prior repair.    Past Medical History:   Diagnosis Date    Gout     Hypertension     Hypothyroidism     Thyroid disease      Past Surgical History:   Procedure Laterality Date    CARDIAC ELECTROPHYSIOLOGY STUDY AND ABLATION  2010    LEG SURGERY Left      Review of patient's allergies indicates:   Allergen Reactions    Allopurinol analogues Other (See Comments)     Abdominal cramps     Medication List with Changes/Refills   Current Medications    COLCHICINE (COLCRYS) 0.6 MG TABLET    Take 1 tablet (0.6 mg total) by mouth once daily.    IBUPROFEN (ADVIL,MOTRIN) 800 MG TABLET    TAKE 1 TABLET(800 MG) BY MOUTH THREE TIMES DAILY WITH MEALS AS NEEDED FOR PAIN    INDOMETHACIN (INDOCIN) 50 MG CAPSULE    Take 1 capsule (50 mg total) by mouth 3 (three) times daily with meals.    LOSARTAN (COZAAR) 50 MG TABLET    TAKE 1 TABLET(50 MG) BY MOUTH EVERY DAY    METHIMAZOLE (TAPAZOLE) 10 MG TAB    TAKE 1 TABLET(10 MG) BY MOUTH EVERY DAY     Family History   Problem Relation Age of Onset    Hypertension Mother     Hypertension Father     Diabetes Father      Social History     Socioeconomic History    Marital status: Single     Spouse name: Not on file    Number of children: 3    Years of education: Not on file    Highest education level: Not on file   Occupational History    Occupation:       Comment: [employer]   Social Needs    Financial resource strain: Somewhat hard    Food insecurity:     Worry: Sometimes true     Inability: Never true    Transportation needs:     Medical: No     Non-medical: No   Tobacco Use    Smoking status: Never  Smoker    Smokeless tobacco: Never Used   Substance and Sexual Activity    Alcohol use: Never     Frequency: Never     Drinks per session: Patient refused     Binge frequency: Never    Drug use: Never    Sexual activity: Yes     Partners: Female   Lifestyle    Physical activity:     Days per week: 5 days     Minutes per session: 100 min    Stress: To some extent   Relationships    Social connections:     Talks on phone: More than three times a week     Gets together: Never     Attends Nondenominational service: Not on file     Active member of club or organization: No     Attends meetings of clubs or organizations: Never     Relationship status: Living with partner   Other Topics Concern    Not on file   Social History Narrative    23.18    25          Review of Systems   Constitutional: Negative for appetite change, chills, fever and unexpected weight change.   HENT: Negative for hearing loss, rhinorrhea, sore throat and voice change.    Eyes: Negative for photophobia and visual disturbance.   Respiratory: Negative for cough, choking and shortness of breath.    Cardiovascular: Negative for chest pain, palpitations and leg swelling.   Gastrointestinal: Negative for abdominal pain, blood in stool, constipation, diarrhea, nausea and vomiting.   Endocrine: Negative for cold intolerance, heat intolerance and polyphagia.   Genitourinary: Negative for dysuria.   Musculoskeletal: Negative for arthralgias and back pain.   Skin: Negative for color change.   Neurological: Negative for dizziness, seizures, syncope and headaches.   Hematological: Negative for adenopathy. Does not bruise/bleed easily.       Objective:      Physical Exam   Constitutional: He is oriented to person, place, and time. He appears well-developed and well-nourished.  Non-toxic appearance. No distress.   HENT:   Head: Normocephalic and atraumatic. Head is without abrasion and without laceration.   Right Ear: External ear normal.   Left Ear: External ear  normal.   Nose: Nose normal.   Mouth/Throat: Oropharynx is clear and moist.   Eyes: Pupils are equal, round, and reactive to light. EOM are normal.   Neck: Trachea normal and phonation normal. Neck supple. No tracheal deviation and normal range of motion present.   Cardiovascular: Normal rate and regular rhythm.   Pulmonary/Chest: Effort normal. No accessory muscle usage. No tachypnea. No respiratory distress.   Abdominal: Soft. Normal appearance. He exhibits no distension. There is no hepatosplenomegaly. There is no tenderness. There is no rigidity, no rebound and no guarding. A hernia is present. Hernia confirmed positive in the ventral area.       Reducible umbilical hernia   Lymphadenopathy:        Right: No inguinal adenopathy present.        Left: No inguinal adenopathy present.   Neurological: He is alert and oriented to person, place, and time. Coordination and gait normal.   Skin: Skin is warm and intact.   Psychiatric: He has a normal mood and affect. His speech is normal and behavior is normal.       Assessment/Plan:   Jose was seen today for other.    Diagnoses and all orders for this visit:    Umbilical hernia without obstruction and without gangrene          Impression/Treatment Plan:  Patient has an umbilical hernia.  It is reducible.  We discussed the risks and benefits of hernia repair.  He would like to proceed with repair but in a couple months.  He will return to the office in 2 months to preop and discuss a definitive date.        I discussed the proposed procedures the the patient including risks, benefits, indications, alternatives and special concerns.  The patient appears to understand and agrees to go ahead with surgery.  I have made no promises, warranties or verbal agreements beyond what was discussed above.

## 2020-05-14 NOTE — PROGRESS NOTES
Pt saw  5/14. Pt surg tentatively sched for 6/22.. Pt will call at the beginning of June to sched office visit & preop.    Pt verbalized understanding that office visit & surg have to be w/in 30 days of one another.

## 2020-05-14 NOTE — LETTER
May 31, 2020      Willie Duran MD  [address]  [address]  [place] [address]           [facility]  [address]  [place] [address]  Phone: [phone]  Fax: [phone]          Patient: Jose Fox   MR Number: [id number]   YOB: 1975   Date of Visit: 5/14/2020       Dear Dr. Willie Duran:    Thank you for referring Jose Fox to me for evaluation. Attached you will find relevant portions of my assessment and plan of care.    If you have questions, please do not hesitate to call me. I look forward to following Jose Fox along with you.    Sincerely,    Pietro Rouse III, MD    Enclosure  CC:  No Recipients    If you would like to receive this communication electronically, please contact [email or web address] or [phone] to request more information on [facility] Link access.    For providers and/or their staff who would like to refer a patient to [facility], please contact us through our one-stop-shop provider referral line, [facility], at [phone].    If you feel you have received this communication in error or would no longer like to receive these types of communications, please e-mail [email or web address]

## 2020-05-27 ENCOUNTER — TELEPHONE (OUTPATIENT)
Dept: FAMILY MEDICINE | Facility: CLINIC | Age: 45
End: 2020-05-27

## 2020-05-27 DIAGNOSIS — Z87.39 PERSONAL HISTORY OF GOUT: ICD-10-CM

## 2020-05-27 DIAGNOSIS — M1A.0710 CHRONIC IDIOPATHIC GOUT INVOLVING TOE OF RIGHT FOOT WITHOUT TOPHUS: ICD-10-CM

## 2020-05-27 DIAGNOSIS — M19.90 ARTHRITIS: ICD-10-CM

## 2020-05-27 RX ORDER — COLCHICINE 0.6 MG/1
0.6 TABLET ORAL DAILY
Qty: 14 TABLET | Refills: 0 | Status: SHIPPED | OUTPATIENT
Start: 2020-05-27 | End: 2021-10-29

## 2020-05-27 RX ORDER — IBUPROFEN 800 MG/1
TABLET ORAL
Qty: 60 TABLET | Refills: 0 | Status: SHIPPED | OUTPATIENT
Start: 2020-05-27 | End: 2020-05-29

## 2020-05-28 RX ORDER — INDOMETHACIN 50 MG/1
50 CAPSULE ORAL
Qty: 30 CAPSULE | Refills: 2 | Status: CANCELLED | OUTPATIENT
Start: 2020-05-28

## 2020-05-29 ENCOUNTER — OFFICE VISIT (OUTPATIENT)
Dept: FAMILY MEDICINE | Facility: CLINIC | Age: 45
End: 2020-05-29
Payer: COMMERCIAL

## 2020-05-29 VITALS
OXYGEN SATURATION: 98 % | SYSTOLIC BLOOD PRESSURE: 144 MMHG | WEIGHT: 265.31 LBS | BODY MASS INDEX: 35.16 KG/M2 | HEIGHT: 73 IN | TEMPERATURE: 98 F | HEART RATE: 77 BPM | DIASTOLIC BLOOD PRESSURE: 96 MMHG

## 2020-05-29 DIAGNOSIS — M1A.0710 CHRONIC IDIOPATHIC GOUT INVOLVING TOE OF RIGHT FOOT WITHOUT TOPHUS: ICD-10-CM

## 2020-05-29 DIAGNOSIS — I10 ESSENTIAL HYPERTENSION: ICD-10-CM

## 2020-05-29 DIAGNOSIS — M10.9 ACUTE GOUT OF RIGHT HAND, UNSPECIFIED CAUSE: Primary | ICD-10-CM

## 2020-05-29 PROCEDURE — 3008F PR BODY MASS INDEX (BMI) DOCUMENTED: ICD-10-PCS | Mod: S$GLB,,, | Performed by: NURSE PRACTITIONER

## 2020-05-29 PROCEDURE — 3008F BODY MASS INDEX DOCD: CPT | Mod: S$GLB,,, | Performed by: NURSE PRACTITIONER

## 2020-05-29 PROCEDURE — 3080F DIAST BP >= 90 MM HG: CPT | Mod: S$GLB,,, | Performed by: NURSE PRACTITIONER

## 2020-05-29 PROCEDURE — 3077F SYST BP >= 140 MM HG: CPT | Mod: S$GLB,,, | Performed by: NURSE PRACTITIONER

## 2020-05-29 PROCEDURE — 99214 OFFICE O/P EST MOD 30 MIN: CPT | Mod: S$GLB,,, | Performed by: NURSE PRACTITIONER

## 2020-05-29 PROCEDURE — 99214 PR OFFICE/OUTPT VISIT, EST, LEVL IV, 30-39 MIN: ICD-10-PCS | Mod: S$GLB,,, | Performed by: NURSE PRACTITIONER

## 2020-05-29 PROCEDURE — 3077F PR MOST RECENT SYSTOLIC BLOOD PRESSURE >= 140 MM HG: ICD-10-PCS | Mod: S$GLB,,, | Performed by: NURSE PRACTITIONER

## 2020-05-29 PROCEDURE — 3080F PR MOST RECENT DIASTOLIC BLOOD PRESSURE >= 90 MM HG: ICD-10-PCS | Mod: S$GLB,,, | Performed by: NURSE PRACTITIONER

## 2020-05-29 RX ORDER — LOSARTAN POTASSIUM 50 MG/1
75 TABLET ORAL DAILY
Qty: 45 TABLET | Refills: 5 | Status: SHIPPED | OUTPATIENT
Start: 2020-05-29 | End: 2020-12-17

## 2020-05-29 RX ORDER — INDOMETHACIN 50 MG/1
50 CAPSULE ORAL
Qty: 30 CAPSULE | Refills: 2 | Status: SHIPPED | OUTPATIENT
Start: 2020-05-29 | End: 2020-05-29 | Stop reason: SDUPTHER

## 2020-05-29 RX ORDER — INDOMETHACIN 50 MG/1
50 CAPSULE ORAL 3 TIMES DAILY PRN
Qty: 90 CAPSULE | Refills: 0 | Status: SHIPPED | OUTPATIENT
Start: 2020-05-29 | End: 2020-09-16

## 2020-05-29 NOTE — PROGRESS NOTES
SUBJECTIVE:      Patient ID: Jose Fox is a 45 y.o. male.    Chief Complaint: Gout (recent flare up yesterday)    Established patient of Dr. Duran here today for complaint pain in his right 5th finger since yesterday.  He has a past medical history of gout and feels this is a flare up after eating red meat.  He denies any trauma, injury, falls or other known causes.  He is currently taking Colcrys 0.6 mg for gout prophylaxis which works okay for him.  He is out of his indomethacin he takes as needed for pain during a flare up and he needs a refill today.  He knows he will lose his insurance benefits soon and is asking for a larger quantity of pills he can keep for the future.    Hypertension   This is a chronic problem. The current episode started more than 1 year ago. The problem is uncontrolled. Pertinent negatives include no anxiety, blurred vision, chest pain, headaches, neck pain, palpitations, peripheral edema or shortness of breath. Agents associated with hypertension include NSAIDs. Risk factors for coronary artery disease include male gender, obesity and family history. Past treatments include angiotensin blockers. The current treatment provides mild improvement. Compliance problems include exercise and diet.    Hand Pain    Incident onset: x 1 day. There was no injury mechanism. The pain is present in the right fingers. The quality of the pain is described as aching and stabbing. The pain does not radiate. The pain is at a severity of 8/10. The pain has been constant since the incident. Pertinent negatives include no chest pain, muscle weakness, numbness or tingling. He has tried elevation and ice for the symptoms.       Family History   Problem Relation Age of Onset    Hypertension Mother     Hypertension Father     Diabetes Father       Social History     Socioeconomic History    Marital status: Single     Spouse name: Not on file    Number of children: 3    Years of education: Not on file     Highest education level: Not on file   Occupational History    Occupation: [employer]      Comment: [employer] side   Social Needs    Financial resource strain: Somewhat hard    Food insecurity:     Worry: Sometimes true     Inability: Never true    Transportation needs:     Medical: No     Non-medical: No   Tobacco Use    Smoking status: Never Smoker    Smokeless tobacco: Never Used   Substance and Sexual Activity    Alcohol use: Never     Frequency: Never     Drinks per session: Patient refused     Binge frequency: Never    Drug use: Never    Sexual activity: Yes     Partners: Female   Lifestyle    Physical activity:     Days per week: 5 days     Minutes per session: 100 min    Stress: To some extent   Relationships    Social connections:     Talks on phone: More than three times a week     Gets together: Never     Attends Ca[employer] service: Not on file     Active member of club or organization: No     Attends meetings of clubs or organizations: Never     Relationship status: Living with partner   Other Topics Concern    Not on file   Social History Narrative    23.18    25      Current Outpatient Medications   Medication Sig Dispense Refill    colchicine (COLCRYS) 0.6 mg tablet Take 1 tablet (0.6 mg total) by mouth once daily. 14 tablet 0    losartan (COZAAR) 50 MG tablet Take 1.5 tablets (75 mg total) by mouth once daily. 45 tablet 5    methIMAzole (TAPAZOLE) 10 MG Tab TAKE 1 TABLET(10 MG) BY MOUTH EVERY DAY 30 tablet 5    indomethacin (INDOCIN) 50 MG capsule Take 1 capsule (50 mg total) by mouth 3 (three) times daily as needed. 90 capsule 0     No current facility-administered medications for this visit.      Review of patient's allergies indicates:   Allergen Reactions    Allopurinol analogues Other (See Comments)     Abdominal cramps      Past Medical History:   Diagnosis Date    Gout     Hypertension     Hypothyroidism     Thyroid disease      Past Surgical History:   Procedure  "Laterality Date    CARDIAC ELECTROPHYSIOLOGY STUDY AND ABLATION  2010    LEG SURGERY Left        Review of Systems   Constitutional: Negative for activity change, chills, diaphoresis, fever and unexpected weight change.   HENT: Negative for congestion, hearing loss, rhinorrhea and trouble swallowing.    Eyes: Negative for blurred vision, discharge and visual disturbance.   Respiratory: Negative for chest tightness, shortness of breath and wheezing.    Cardiovascular: Negative for chest pain, palpitations and leg swelling.   Gastrointestinal: Negative for abdominal pain, blood in stool, constipation, diarrhea and vomiting.   Endocrine: Negative for cold intolerance, heat intolerance, polydipsia and polyuria.   Genitourinary: Negative for difficulty urinating, frequency, hematuria and urgency.   Musculoskeletal: Positive for arthralgias and joint swelling. Negative for myalgias and neck pain.   Skin: Negative for color change, rash and wound.   Neurological: Negative for dizziness, tingling, tremors, weakness, numbness and headaches.   Hematological: Negative for adenopathy. Does not bruise/bleed easily.   Psychiatric/Behavioral: Negative for confusion and dysphoric mood.      OBJECTIVE:      Vitals:    05/29/20 0839 05/29/20 0859   BP: (!) 142/92 (!) 144/96   BP Location: Left arm    Patient Position: Sitting    BP Method: Large (Manual)    Pulse: 77    Temp: 97.8 °F (36.6 °C)    TempSrc: Oral    SpO2: 98%    Weight: 120.3 kg (265 lb 4.8 oz)    Height: 6' 1" (1.854 m)      Physical Exam   Constitutional: He is oriented to person, place, and time. He appears well-developed. No distress.   HENT:   Head: Normocephalic and atraumatic.   Mouth/Throat: Oropharynx is clear and moist. No oropharyngeal exudate.   Eyes: Pupils are equal, round, and reactive to light. Conjunctivae and EOM are normal.   Neck: Normal range of motion. Neck supple.   Cardiovascular: Normal rate, regular rhythm and normal heart sounds.   No murmur " heard.  Pulmonary/Chest: Effort normal and breath sounds normal. No respiratory distress. He has no wheezes. He has no rales.   Abdominal: Soft. Bowel sounds are normal. There is no tenderness.   Musculoskeletal: He exhibits no edema.        Right hand: He exhibits decreased range of motion, tenderness and swelling. He exhibits no bony tenderness, normal two-point discrimination, normal capillary refill, no deformity and no laceration. Normal sensation noted. Normal strength noted.        Hands:  Lymphadenopathy:     He has no cervical adenopathy.   Neurological: He is alert and oriented to person, place, and time.   Skin: Skin is warm and dry. Capillary refill takes less than 2 seconds. No rash noted.   Psychiatric: He has a normal mood and affect. His behavior is normal. Judgment and thought content normal.   Nursing note and vitals reviewed.     Assessment:       1. Acute gout of right hand, unspecified cause    2. Chronic idiopathic gout involving toe of right foot without tophus    3. Essential hypertension        Plan:       Acute gout of right hand, unspecified cause   *discussed diet and medications     Chronic idiopathic gout involving toe of right foot without tophus  -     indomethacin (INDOCIN) 50 MG capsule; Take 1 capsule (50 mg total) by mouth 3 (three) times daily as needed.  Dispense: 90 capsule; Refill: 0    Essential hypertension  -     losartan (COZAAR) 50 MG tablet; Take 1.5 tablets (75 mg total) by mouth once daily.  Dispense: 45 tablet; Refill: 5   *BP uncontrolled- will increase HTN medication and he will f/u as planned in 3 weeks with PCP      Follow up if symptoms worsen or fail to improve.      5/29/2020 LEENA Watson, FNP

## 2020-05-29 NOTE — PATIENT INSTRUCTIONS
Treating Gout Attacks     Raising the joint above the level of your heart can help reduce gout symptoms.     Gout is a disease that affects the joints. It is caused by excess uric acid in your blood stream that may lead to crystals forming in your joints. Left untreated, it can lead to painful foot and joint deformities and even kidney problems. But, by treating gout early, you can relieve pain and help prevent future problems. Gout can usually be treated with medication and proper diet. In severe cases, surgery may be needed.  Gout attacks are painful and often happen more than once. Taking medications may reduce pain and prevent attacks in the future. There are also some things you can do at home to relieve symptoms.  Medications for gout  Your healthcare provider may prescribe a daily medication to reduce levels of uric acid. Reducing your uric acid levels may help prevent gout attacks. Allopurinol is one commonly used medication taken daily to reduce uric acid levels. Other medications can help relieve pain and swelling during an acute attack. Medicines such as NSAIDs (nonsteroidal anti-inflammatory medicines), steroids, and colchicine may be prescribed for intermittent use to relieve an acute gout attack. Be sure to take your medication as directed.  What you can do  Below are some things you can do at home to relieve gout symptoms. Your healthcare provider may have other tips.  · Rest the painful joint as much as you can.  · Raise the painful joint so it is at a level higher than your heart.  · Use ice for 10 minutes every 1-2 hours as possible.  How can I prevent gout?  With a little effort, you may be able to prevent gout attacks in the future. Here are some things you can do:  · Avoid foods high in purines  ¨ Certain meats (red meat, processed meat, turkey)  ¨ Organ meats (kidney, liver, sweetbread)  ¨ Shellfish (lobster, crab, shrimp, scallop, mussel)  ¨ Certain fish (anchovy, sardine, herring,  mackerel)  · Take any medications prescribed by your healthcare provider.  · Lose weight if you need to.  · Reduce high fructose corn syrup in meals and drinks.  · Reduce or eliminate consumption of alcohol, particularly beer, but also red wine and spirits.  · Control blood pressure, diabetes, and cholesterol.  · Drink plenty of water to help flush uric acid from your body.  Date Last Reviewed: 2/1/2016 © 2000-2017 [employer]. [address] [place] [address], [place] [address]. All rights reserved. This information is not intended as a substitute for professional medical care. Always follow your healthcare professional's instructions.        Eating to Prevent Gout  Gout is a painful form of arthritis caused by an excess of uric acid. This is a waste product made by the body. It builds up in the body and forms crystals that collect in the joints, bringing on a gout attack. Alcohol and certain foods can trigger a gout attack. Below are some guidelines for changing your diet to help you manage gout. Your healthcare provider can work with you to determine the best eating plan for you. Know that diet is only one part of managing gout. Take your medicines as prescribed and follow the other guidelines your healthcare provider has given you.  Foods to limit  Eating too many foods containing purines may increase the levels of uric acid in your body and increase your risk for a gout attack. It may be best to limit these high-purine foods:  · Alcohol (beer, red wine). You may be told to avoid alcohol completely.  · Certain fish (anchovies, sardines, fish roes, herring, tuna, mussels, codfish, scallops, trout, and kushal)  · Certain meats (red meat, processed meat, gupta, turkey, wild game, and goose)  · Sauces and gravies made with meat  · Organ meats (such as liver, kidneys, sweetbreads, and tripe)  · Legumes (such as dried beans, peas)  · Mushrooms, spinach, asparagus, and cauliflower  · Yeast and yeast extract  supplements  Foods to try  Some foods may be helpful for people with gout. You may want to try adding some of the following foods to your diet:  · Dark berries: These include blueberries, blackberries, and cherries. These berries contain chemicals that may lower uric acid.  · Tofu: Tofu, which is made from soy, is a good source of protein. Studies have shown that it may be a better choice than meat for people with gout.  · Omega fatty acids: These acids are found in fatty fish (such as salmon), certain oils (such as flax, olive, or nut oils), or nuts. They may help prevent inflammation due to gout.  The following guidelines are recommended by the American Medical Association for people with gout. Your diet should be:  · High in fiber, whole grains, fruits, and vegetables.  · Low in protein (15% of calories should come from protein. Choose lean sources such as soy, lean meats, and poultry).  · Low in fat (no more than 30% of calories should come from fat, with only 10% coming from animal fat).   Date Last Reviewed: 6/17/2015  © 7956-4315 [employer]. [address] [place] [address], [place] [address]. All rights reserved. This information is not intended as a substitute for professional medical care. Always follow your healthcare professional's instructions.

## 2020-06-04 ENCOUNTER — TELEPHONE (OUTPATIENT)
Dept: SURGERY | Facility: CLINIC | Age: 45
End: 2020-06-04

## 2020-06-04 NOTE — TELEPHONE ENCOUNTER
Called pt to FU on previous conversation.. He is tentatively scheduled for surgery 6/22.. However pt was to call this month & sched an office visit (along w/ hospital preop apt)..     Left message to confirm & sched apts.

## 2020-06-23 NOTE — TELEPHONE ENCOUNTER
Adam. Attempts to reach pt regarding surg.. have not heard back from pt.. If/when pt calls to sched surg, he will need to RTO prior to scheduling surgery

## 2020-10-15 DIAGNOSIS — M1A.0710 CHRONIC IDIOPATHIC GOUT INVOLVING TOE OF RIGHT FOOT WITHOUT TOPHUS: ICD-10-CM

## 2020-10-15 RX ORDER — INDOMETHACIN 50 MG/1
CAPSULE ORAL
Qty: 90 CAPSULE | Refills: 0 | Status: SHIPPED | OUTPATIENT
Start: 2020-10-15 | End: 2020-12-18

## 2020-10-15 NOTE — TELEPHONE ENCOUNTER
Blood pressure slightly high today can increase the Cozaar to 100 mg twice a day.  Recheck blood pressure with

## 2021-10-29 ENCOUNTER — TELEPHONE (OUTPATIENT)
Dept: INTERNAL MEDICINE | Facility: CLINIC | Age: 46
End: 2021-10-29

## 2021-10-29 ENCOUNTER — PATIENT MESSAGE (OUTPATIENT)
Dept: INTERNAL MEDICINE | Facility: CLINIC | Age: 46
End: 2021-10-29

## 2021-10-29 ENCOUNTER — OFFICE VISIT (OUTPATIENT)
Dept: INTERNAL MEDICINE | Facility: CLINIC | Age: 46
End: 2021-10-29
Payer: COMMERCIAL

## 2021-10-29 ENCOUNTER — LAB VISIT (OUTPATIENT)
Dept: LAB | Facility: HOSPITAL | Age: 46
End: 2021-10-29
Attending: STUDENT IN AN ORGANIZED HEALTH CARE EDUCATION/TRAINING PROGRAM
Payer: COMMERCIAL

## 2021-10-29 ENCOUNTER — IMMUNIZATION (OUTPATIENT)
Dept: INTERNAL MEDICINE | Facility: CLINIC | Age: 46
End: 2021-10-29
Payer: COMMERCIAL

## 2021-10-29 VITALS
WEIGHT: 263.69 LBS | HEIGHT: 73 IN | TEMPERATURE: 98 F | HEART RATE: 76 BPM | DIASTOLIC BLOOD PRESSURE: 100 MMHG | SYSTOLIC BLOOD PRESSURE: 140 MMHG | BODY MASS INDEX: 34.95 KG/M2

## 2021-10-29 DIAGNOSIS — R31.0 GROSS HEMATURIA: Primary | ICD-10-CM

## 2021-10-29 DIAGNOSIS — E66.9 OBESITY, UNSPECIFIED CLASSIFICATION, UNSPECIFIED OBESITY TYPE, UNSPECIFIED WHETHER SERIOUS COMORBIDITY PRESENT: ICD-10-CM

## 2021-10-29 DIAGNOSIS — Z00.00 ANNUAL PHYSICAL EXAM: ICD-10-CM

## 2021-10-29 DIAGNOSIS — E05.90 HYPERTHYROIDISM: Primary | ICD-10-CM

## 2021-10-29 DIAGNOSIS — I10 ESSENTIAL HYPERTENSION: ICD-10-CM

## 2021-10-29 LAB
ALBUMIN SERPL BCP-MCNC: 3.8 G/DL (ref 3.5–5.2)
ALP SERPL-CCNC: 67 U/L (ref 55–135)
ALT SERPL W/O P-5'-P-CCNC: 25 U/L (ref 10–44)
ANION GAP SERPL CALC-SCNC: 8 MMOL/L (ref 8–16)
AST SERPL-CCNC: 20 U/L (ref 10–40)
BASOPHILS # BLD AUTO: 0.08 K/UL (ref 0–0.2)
BASOPHILS NFR BLD: 1.1 % (ref 0–1.9)
BILIRUB SERPL-MCNC: 1 MG/DL (ref 0.1–1)
BILIRUB SERPL-MCNC: NEGATIVE MG/DL
BLOOD URINE, POC: NORMAL
BUN SERPL-MCNC: 26 MG/DL (ref 6–20)
CALCIUM SERPL-MCNC: 9.4 MG/DL (ref 8.7–10.5)
CHLORIDE SERPL-SCNC: 106 MMOL/L (ref 95–110)
CHOLEST SERPL-MCNC: 131 MG/DL (ref 120–199)
CHOLEST/HDLC SERPL: 4.7 {RATIO} (ref 2–5)
CO2 SERPL-SCNC: 26 MMOL/L (ref 23–29)
COLOR, POC UA: YELLOW
CREAT SERPL-MCNC: 1.3 MG/DL (ref 0.5–1.4)
DIFFERENTIAL METHOD: ABNORMAL
EOSINOPHIL # BLD AUTO: 0.2 K/UL (ref 0–0.5)
EOSINOPHIL NFR BLD: 2.1 % (ref 0–8)
ERYTHROCYTE [DISTWIDTH] IN BLOOD BY AUTOMATED COUNT: 12 % (ref 11.5–14.5)
EST. GFR  (AFRICAN AMERICAN): >60 ML/MIN/1.73 M^2
EST. GFR  (NON AFRICAN AMERICAN): >60 ML/MIN/1.73 M^2
ESTIMATED AVG GLUCOSE: 134 MG/DL (ref 68–131)
GLUCOSE SERPL-MCNC: 159 MG/DL (ref 70–110)
GLUCOSE UR QL STRIP: 100
HBA1C MFR BLD: 6.3 % (ref 4–5.6)
HCT VFR BLD AUTO: 42.9 % (ref 40–54)
HDLC SERPL-MCNC: 28 MG/DL (ref 40–75)
HDLC SERPL: 21.4 % (ref 20–50)
HGB BLD-MCNC: 14.9 G/DL (ref 14–18)
IMM GRANULOCYTES # BLD AUTO: 0.1 K/UL (ref 0–0.04)
IMM GRANULOCYTES NFR BLD AUTO: 1.3 % (ref 0–0.5)
KETONES UR QL STRIP: NEGATIVE
LDLC SERPL CALC-MCNC: 89 MG/DL (ref 63–159)
LEUKOCYTE ESTERASE URINE, POC: NEGATIVE
LYMPHOCYTES # BLD AUTO: 2 K/UL (ref 1–4.8)
LYMPHOCYTES NFR BLD: 26.4 % (ref 18–48)
MCH RBC QN AUTO: 30.2 PG (ref 27–31)
MCHC RBC AUTO-ENTMCNC: 34.7 G/DL (ref 32–36)
MCV RBC AUTO: 87 FL (ref 82–98)
MONOCYTES # BLD AUTO: 0.7 K/UL (ref 0.3–1)
MONOCYTES NFR BLD: 8.8 % (ref 4–15)
NEUTROPHILS # BLD AUTO: 4.5 K/UL (ref 1.8–7.7)
NEUTROPHILS NFR BLD: 60.3 % (ref 38–73)
NITRITE, POC UA: NEGATIVE
NONHDLC SERPL-MCNC: 103 MG/DL
NRBC BLD-RTO: 0 /100 WBC
PH, POC UA: 5
PLATELET # BLD AUTO: 244 K/UL (ref 150–450)
PMV BLD AUTO: 10.2 FL (ref 9.2–12.9)
POTASSIUM SERPL-SCNC: 4.6 MMOL/L (ref 3.5–5.1)
PROT SERPL-MCNC: 7.3 G/DL (ref 6–8.4)
PROTEIN, POC: NORMAL
RBC # BLD AUTO: 4.93 M/UL (ref 4.6–6.2)
SODIUM SERPL-SCNC: 140 MMOL/L (ref 136–145)
SPECIFIC GRAVITY, POC UA: 1.02
T4 FREE SERPL-MCNC: 1.61 NG/DL (ref 0.71–1.51)
TRIGL SERPL-MCNC: 70 MG/DL (ref 30–150)
TSH SERPL DL<=0.005 MIU/L-ACNC: 0.01 UIU/ML (ref 0.4–4)
UROBILINOGEN, POC UA: NORMAL
WBC # BLD AUTO: 7.47 K/UL (ref 3.9–12.7)

## 2021-10-29 PROCEDURE — 90686 FLU VACCINE (QUAD) GREATER THAN OR EQUAL TO 3YO PRESERVATIVE FREE IM: ICD-10-PCS | Mod: S$GLB,,, | Performed by: STUDENT IN AN ORGANIZED HEALTH CARE EDUCATION/TRAINING PROGRAM

## 2021-10-29 PROCEDURE — 84443 ASSAY THYROID STIM HORMONE: CPT | Performed by: STUDENT IN AN ORGANIZED HEALTH CARE EDUCATION/TRAINING PROGRAM

## 2021-10-29 PROCEDURE — 36415 COLL VENOUS BLD VENIPUNCTURE: CPT | Performed by: STUDENT IN AN ORGANIZED HEALTH CARE EDUCATION/TRAINING PROGRAM

## 2021-10-29 PROCEDURE — 1159F MED LIST DOCD IN RCRD: CPT | Mod: CPTII,S$GLB,, | Performed by: STUDENT IN AN ORGANIZED HEALTH CARE EDUCATION/TRAINING PROGRAM

## 2021-10-29 PROCEDURE — 90686 IIV4 VACC NO PRSV 0.5 ML IM: CPT | Mod: S$GLB,,, | Performed by: STUDENT IN AN ORGANIZED HEALTH CARE EDUCATION/TRAINING PROGRAM

## 2021-10-29 PROCEDURE — 99204 OFFICE O/P NEW MOD 45 MIN: CPT | Mod: 25,S$GLB,, | Performed by: STUDENT IN AN ORGANIZED HEALTH CARE EDUCATION/TRAINING PROGRAM

## 2021-10-29 PROCEDURE — 3077F PR MOST RECENT SYSTOLIC BLOOD PRESSURE >= 140 MM HG: ICD-10-PCS | Mod: CPTII,S$GLB,, | Performed by: STUDENT IN AN ORGANIZED HEALTH CARE EDUCATION/TRAINING PROGRAM

## 2021-10-29 PROCEDURE — 80053 COMPREHEN METABOLIC PANEL: CPT | Performed by: STUDENT IN AN ORGANIZED HEALTH CARE EDUCATION/TRAINING PROGRAM

## 2021-10-29 PROCEDURE — 99204 PR OFFICE/OUTPT VISIT, NEW, LEVL IV, 45-59 MIN: ICD-10-PCS | Mod: 25,S$GLB,, | Performed by: STUDENT IN AN ORGANIZED HEALTH CARE EDUCATION/TRAINING PROGRAM

## 2021-10-29 PROCEDURE — 99999 PR PBB SHADOW E&M-EST. PATIENT-LVL III: ICD-10-PCS | Mod: PBBFAC,,, | Performed by: STUDENT IN AN ORGANIZED HEALTH CARE EDUCATION/TRAINING PROGRAM

## 2021-10-29 PROCEDURE — 1160F RVW MEDS BY RX/DR IN RCRD: CPT | Mod: CPTII,S$GLB,, | Performed by: STUDENT IN AN ORGANIZED HEALTH CARE EDUCATION/TRAINING PROGRAM

## 2021-10-29 PROCEDURE — 81001 URINALYSIS AUTO W/SCOPE: CPT | Mod: S$GLB,,, | Performed by: STUDENT IN AN ORGANIZED HEALTH CARE EDUCATION/TRAINING PROGRAM

## 2021-10-29 PROCEDURE — 3080F PR MOST RECENT DIASTOLIC BLOOD PRESSURE >= 90 MM HG: ICD-10-PCS | Mod: CPTII,S$GLB,, | Performed by: STUDENT IN AN ORGANIZED HEALTH CARE EDUCATION/TRAINING PROGRAM

## 2021-10-29 PROCEDURE — 85025 COMPLETE CBC W/AUTO DIFF WBC: CPT | Performed by: STUDENT IN AN ORGANIZED HEALTH CARE EDUCATION/TRAINING PROGRAM

## 2021-10-29 PROCEDURE — 80061 LIPID PANEL: CPT | Performed by: STUDENT IN AN ORGANIZED HEALTH CARE EDUCATION/TRAINING PROGRAM

## 2021-10-29 PROCEDURE — 83036 HEMOGLOBIN GLYCOSYLATED A1C: CPT | Performed by: STUDENT IN AN ORGANIZED HEALTH CARE EDUCATION/TRAINING PROGRAM

## 2021-10-29 PROCEDURE — 3008F BODY MASS INDEX DOCD: CPT | Mod: CPTII,S$GLB,, | Performed by: STUDENT IN AN ORGANIZED HEALTH CARE EDUCATION/TRAINING PROGRAM

## 2021-10-29 PROCEDURE — 3077F SYST BP >= 140 MM HG: CPT | Mod: CPTII,S$GLB,, | Performed by: STUDENT IN AN ORGANIZED HEALTH CARE EDUCATION/TRAINING PROGRAM

## 2021-10-29 PROCEDURE — 90471 FLU VACCINE (QUAD) GREATER THAN OR EQUAL TO 3YO PRESERVATIVE FREE IM: ICD-10-PCS | Mod: S$GLB,,, | Performed by: STUDENT IN AN ORGANIZED HEALTH CARE EDUCATION/TRAINING PROGRAM

## 2021-10-29 PROCEDURE — 1159F PR MEDICATION LIST DOCUMENTED IN MEDICAL RECORD: ICD-10-PCS | Mod: CPTII,S$GLB,, | Performed by: STUDENT IN AN ORGANIZED HEALTH CARE EDUCATION/TRAINING PROGRAM

## 2021-10-29 PROCEDURE — 1160F PR REVIEW ALL MEDS BY PRESCRIBER/CLIN PHARMACIST DOCUMENTED: ICD-10-PCS | Mod: CPTII,S$GLB,, | Performed by: STUDENT IN AN ORGANIZED HEALTH CARE EDUCATION/TRAINING PROGRAM

## 2021-10-29 PROCEDURE — 4010F PR ACE/ARB THEARPY RXD/TAKEN: ICD-10-PCS | Mod: CPTII,S$GLB,, | Performed by: STUDENT IN AN ORGANIZED HEALTH CARE EDUCATION/TRAINING PROGRAM

## 2021-10-29 PROCEDURE — 90471 IMMUNIZATION ADMIN: CPT | Mod: S$GLB,,, | Performed by: STUDENT IN AN ORGANIZED HEALTH CARE EDUCATION/TRAINING PROGRAM

## 2021-10-29 PROCEDURE — 3008F PR BODY MASS INDEX (BMI) DOCUMENTED: ICD-10-PCS | Mod: CPTII,S$GLB,, | Performed by: STUDENT IN AN ORGANIZED HEALTH CARE EDUCATION/TRAINING PROGRAM

## 2021-10-29 PROCEDURE — 4010F ACE/ARB THERAPY RXD/TAKEN: CPT | Mod: CPTII,S$GLB,, | Performed by: STUDENT IN AN ORGANIZED HEALTH CARE EDUCATION/TRAINING PROGRAM

## 2021-10-29 PROCEDURE — 84439 ASSAY OF FREE THYROXINE: CPT | Performed by: STUDENT IN AN ORGANIZED HEALTH CARE EDUCATION/TRAINING PROGRAM

## 2021-10-29 PROCEDURE — 3080F DIAST BP >= 90 MM HG: CPT | Mod: CPTII,S$GLB,, | Performed by: STUDENT IN AN ORGANIZED HEALTH CARE EDUCATION/TRAINING PROGRAM

## 2021-10-29 PROCEDURE — 81001 POCT URINALYSIS, DIPSTICK OR TABLET REAGENT, AUTOMATED, WITH MICROSCOP: ICD-10-PCS | Mod: S$GLB,,, | Performed by: STUDENT IN AN ORGANIZED HEALTH CARE EDUCATION/TRAINING PROGRAM

## 2021-10-29 PROCEDURE — 99999 PR PBB SHADOW E&M-EST. PATIENT-LVL III: CPT | Mod: PBBFAC,,, | Performed by: STUDENT IN AN ORGANIZED HEALTH CARE EDUCATION/TRAINING PROGRAM

## 2021-10-29 RX ORDER — LOSARTAN POTASSIUM 100 MG/1
100 TABLET ORAL DAILY
Qty: 90 TABLET | Refills: 3 | Status: SHIPPED | OUTPATIENT
Start: 2021-10-29 | End: 2022-01-31

## 2021-11-08 ENCOUNTER — PATIENT MESSAGE (OUTPATIENT)
Dept: INTERNAL MEDICINE | Facility: CLINIC | Age: 46
End: 2021-11-08
Payer: COMMERCIAL

## 2021-11-12 ENCOUNTER — PATIENT MESSAGE (OUTPATIENT)
Dept: INTERNAL MEDICINE | Facility: CLINIC | Age: 46
End: 2021-11-12
Payer: COMMERCIAL

## 2021-11-12 ENCOUNTER — LAB VISIT (OUTPATIENT)
Dept: LAB | Facility: HOSPITAL | Age: 46
End: 2021-11-12
Attending: STUDENT IN AN ORGANIZED HEALTH CARE EDUCATION/TRAINING PROGRAM
Payer: COMMERCIAL

## 2021-11-12 DIAGNOSIS — E05.90 HYPERTHYROIDISM: ICD-10-CM

## 2021-11-12 LAB — THYROPEROXIDASE IGG SERPL-ACNC: 2233.5 IU/ML

## 2021-11-12 PROCEDURE — 86376 MICROSOMAL ANTIBODY EACH: CPT | Performed by: STUDENT IN AN ORGANIZED HEALTH CARE EDUCATION/TRAINING PROGRAM

## 2021-11-12 PROCEDURE — 83520 IMMUNOASSAY QUANT NOS NONAB: CPT | Performed by: STUDENT IN AN ORGANIZED HEALTH CARE EDUCATION/TRAINING PROGRAM

## 2021-11-15 ENCOUNTER — HOSPITAL ENCOUNTER (OUTPATIENT)
Dept: RADIOLOGY | Facility: HOSPITAL | Age: 46
Discharge: HOME | End: 2021-11-15
Attending: STUDENT IN AN ORGANIZED HEALTH CARE EDUCATION/TRAINING PROGRAM
Payer: COMMERCIAL

## 2021-11-15 DIAGNOSIS — E05.90 HYPERTHYROIDISM: ICD-10-CM

## 2021-11-15 LAB — TSH RECEP AB SER-ACNC: 1.59 IU/L (ref 0–1.75)

## 2021-11-15 PROCEDURE — 76536 US SOFT TISSUE HEAD NECK THYROID: ICD-10-PCS | Mod: 26,,, | Performed by: RADIOLOGY

## 2021-11-15 PROCEDURE — 76536 US EXAM OF HEAD AND NECK: CPT | Mod: 26,,, | Performed by: RADIOLOGY

## 2021-11-15 PROCEDURE — 76536 US EXAM OF HEAD AND NECK: CPT | Mod: TC

## 2021-11-16 ENCOUNTER — PATIENT MESSAGE (OUTPATIENT)
Dept: INTERNAL MEDICINE | Facility: CLINIC | Age: 46
End: 2021-11-16
Payer: COMMERCIAL

## 2021-11-16 DIAGNOSIS — E05.90 HYPERTHYROIDISM: ICD-10-CM

## 2021-11-16 DIAGNOSIS — E06.3 HASHIMOTO'S THYROIDITIS: Primary | ICD-10-CM

## 2021-12-24 ENCOUNTER — PATIENT MESSAGE (OUTPATIENT)
Dept: INTERNAL MEDICINE | Facility: CLINIC | Age: 46
End: 2021-12-24
Payer: COMMERCIAL

## 2021-12-24 DIAGNOSIS — M1A.0710 CHRONIC IDIOPATHIC GOUT INVOLVING TOE OF RIGHT FOOT WITHOUT TOPHUS: ICD-10-CM

## 2021-12-27 RX ORDER — INDOMETHACIN 50 MG/1
50 CAPSULE ORAL
Qty: 50 CAPSULE | Refills: 0 | Status: SHIPPED | OUTPATIENT
Start: 2021-12-27 | End: 2022-01-31 | Stop reason: SDUPTHER

## 2022-01-24 ENCOUNTER — PATIENT MESSAGE (OUTPATIENT)
Dept: INTERNAL MEDICINE | Facility: CLINIC | Age: 47
End: 2022-01-24
Payer: COMMERCIAL

## 2022-01-24 ENCOUNTER — LAB VISIT (OUTPATIENT)
Dept: LAB | Facility: HOSPITAL | Age: 47
End: 2022-01-24
Attending: STUDENT IN AN ORGANIZED HEALTH CARE EDUCATION/TRAINING PROGRAM
Payer: COMMERCIAL

## 2022-01-24 DIAGNOSIS — E05.90 HYPERTHYROIDISM: ICD-10-CM

## 2022-01-24 DIAGNOSIS — E06.3 HASHIMOTO'S THYROIDITIS: ICD-10-CM

## 2022-01-24 LAB
T4 FREE SERPL-MCNC: 1.33 NG/DL (ref 0.71–1.51)
TSH SERPL DL<=0.005 MIU/L-ACNC: <0.01 UIU/ML (ref 0.4–4)

## 2022-01-24 PROCEDURE — 36415 COLL VENOUS BLD VENIPUNCTURE: CPT | Performed by: STUDENT IN AN ORGANIZED HEALTH CARE EDUCATION/TRAINING PROGRAM

## 2022-01-24 PROCEDURE — 84443 ASSAY THYROID STIM HORMONE: CPT | Performed by: STUDENT IN AN ORGANIZED HEALTH CARE EDUCATION/TRAINING PROGRAM

## 2022-01-24 PROCEDURE — 84439 ASSAY OF FREE THYROXINE: CPT | Performed by: STUDENT IN AN ORGANIZED HEALTH CARE EDUCATION/TRAINING PROGRAM

## 2022-01-24 NOTE — TELEPHONE ENCOUNTER
Patient had lab today looked at the [facility] results and would like to know if there is anything he should do about the abnormal lab?

## 2022-01-31 ENCOUNTER — OFFICE VISIT (OUTPATIENT)
Dept: INTERNAL MEDICINE | Facility: CLINIC | Age: 47
End: 2022-01-31
Payer: COMMERCIAL

## 2022-01-31 ENCOUNTER — LAB VISIT (OUTPATIENT)
Dept: LAB | Facility: HOSPITAL | Age: 47
End: 2022-01-31
Attending: STUDENT IN AN ORGANIZED HEALTH CARE EDUCATION/TRAINING PROGRAM
Payer: COMMERCIAL

## 2022-01-31 VITALS
HEIGHT: 73 IN | HEART RATE: 72 BPM | OXYGEN SATURATION: 99 % | DIASTOLIC BLOOD PRESSURE: 94 MMHG | WEIGHT: 260.13 LBS | SYSTOLIC BLOOD PRESSURE: 156 MMHG | BODY MASS INDEX: 34.47 KG/M2

## 2022-01-31 DIAGNOSIS — N52.9 ERECTILE DYSFUNCTION, UNSPECIFIED ERECTILE DYSFUNCTION TYPE: ICD-10-CM

## 2022-01-31 DIAGNOSIS — Z12.11 COLON CANCER SCREENING: ICD-10-CM

## 2022-01-31 DIAGNOSIS — R53.83 FATIGUE, UNSPECIFIED TYPE: ICD-10-CM

## 2022-01-31 DIAGNOSIS — R73.03 PREDIABETES: ICD-10-CM

## 2022-01-31 DIAGNOSIS — M1A.0710 CHRONIC IDIOPATHIC GOUT INVOLVING TOE OF RIGHT FOOT WITHOUT TOPHUS: ICD-10-CM

## 2022-01-31 DIAGNOSIS — F32.A DEPRESSION, UNSPECIFIED DEPRESSION TYPE: ICD-10-CM

## 2022-01-31 DIAGNOSIS — E05.90 SUBCLINICAL HYPERTHYROIDISM: Primary | ICD-10-CM

## 2022-01-31 DIAGNOSIS — G47.00 INSOMNIA, UNSPECIFIED TYPE: ICD-10-CM

## 2022-01-31 DIAGNOSIS — I10 ESSENTIAL HYPERTENSION: ICD-10-CM

## 2022-01-31 LAB — TESTOST SERPL-MCNC: 440 NG/DL (ref 304–1227)

## 2022-01-31 PROCEDURE — 3077F PR MOST RECENT SYSTOLIC BLOOD PRESSURE >= 140 MM HG: ICD-10-PCS | Mod: CPTII,S$GLB,, | Performed by: STUDENT IN AN ORGANIZED HEALTH CARE EDUCATION/TRAINING PROGRAM

## 2022-01-31 PROCEDURE — 99214 PR OFFICE/OUTPT VISIT, EST, LEVL IV, 30-39 MIN: ICD-10-PCS | Mod: S$GLB,,, | Performed by: STUDENT IN AN ORGANIZED HEALTH CARE EDUCATION/TRAINING PROGRAM

## 2022-01-31 PROCEDURE — 1159F MED LIST DOCD IN RCRD: CPT | Mod: CPTII,S$GLB,, | Performed by: STUDENT IN AN ORGANIZED HEALTH CARE EDUCATION/TRAINING PROGRAM

## 2022-01-31 PROCEDURE — 99999 PR PBB SHADOW E&M-EST. PATIENT-LVL IV: ICD-10-PCS | Mod: PBBFAC,,, | Performed by: STUDENT IN AN ORGANIZED HEALTH CARE EDUCATION/TRAINING PROGRAM

## 2022-01-31 PROCEDURE — 3008F BODY MASS INDEX DOCD: CPT | Mod: CPTII,S$GLB,, | Performed by: STUDENT IN AN ORGANIZED HEALTH CARE EDUCATION/TRAINING PROGRAM

## 2022-01-31 PROCEDURE — 1160F RVW MEDS BY RX/DR IN RCRD: CPT | Mod: CPTII,S$GLB,, | Performed by: STUDENT IN AN ORGANIZED HEALTH CARE EDUCATION/TRAINING PROGRAM

## 2022-01-31 PROCEDURE — 3077F SYST BP >= 140 MM HG: CPT | Mod: CPTII,S$GLB,, | Performed by: STUDENT IN AN ORGANIZED HEALTH CARE EDUCATION/TRAINING PROGRAM

## 2022-01-31 PROCEDURE — 3080F DIAST BP >= 90 MM HG: CPT | Mod: CPTII,S$GLB,, | Performed by: STUDENT IN AN ORGANIZED HEALTH CARE EDUCATION/TRAINING PROGRAM

## 2022-01-31 PROCEDURE — 36415 COLL VENOUS BLD VENIPUNCTURE: CPT | Performed by: STUDENT IN AN ORGANIZED HEALTH CARE EDUCATION/TRAINING PROGRAM

## 2022-01-31 PROCEDURE — 3080F PR MOST RECENT DIASTOLIC BLOOD PRESSURE >= 90 MM HG: ICD-10-PCS | Mod: CPTII,S$GLB,, | Performed by: STUDENT IN AN ORGANIZED HEALTH CARE EDUCATION/TRAINING PROGRAM

## 2022-01-31 PROCEDURE — 99999 PR PBB SHADOW E&M-EST. PATIENT-LVL IV: CPT | Mod: PBBFAC,,, | Performed by: STUDENT IN AN ORGANIZED HEALTH CARE EDUCATION/TRAINING PROGRAM

## 2022-01-31 PROCEDURE — 1159F PR MEDICATION LIST DOCUMENTED IN MEDICAL RECORD: ICD-10-PCS | Mod: CPTII,S$GLB,, | Performed by: STUDENT IN AN ORGANIZED HEALTH CARE EDUCATION/TRAINING PROGRAM

## 2022-01-31 PROCEDURE — 99214 OFFICE O/P EST MOD 30 MIN: CPT | Mod: S$GLB,,, | Performed by: STUDENT IN AN ORGANIZED HEALTH CARE EDUCATION/TRAINING PROGRAM

## 2022-01-31 PROCEDURE — 1160F PR REVIEW ALL MEDS BY PRESCRIBER/CLIN PHARMACIST DOCUMENTED: ICD-10-PCS | Mod: CPTII,S$GLB,, | Performed by: STUDENT IN AN ORGANIZED HEALTH CARE EDUCATION/TRAINING PROGRAM

## 2022-01-31 PROCEDURE — 84403 ASSAY OF TOTAL TESTOSTERONE: CPT | Performed by: STUDENT IN AN ORGANIZED HEALTH CARE EDUCATION/TRAINING PROGRAM

## 2022-01-31 PROCEDURE — 3008F PR BODY MASS INDEX (BMI) DOCUMENTED: ICD-10-PCS | Mod: CPTII,S$GLB,, | Performed by: STUDENT IN AN ORGANIZED HEALTH CARE EDUCATION/TRAINING PROGRAM

## 2022-01-31 RX ORDER — BUPROPION HYDROCHLORIDE 150 MG/1
150 TABLET ORAL DAILY
Qty: 30 TABLET | Refills: 11 | Status: SHIPPED | OUTPATIENT
Start: 2022-01-31 | End: 2022-05-09

## 2022-01-31 RX ORDER — ALLOPURINOL 100 MG/1
100 TABLET ORAL DAILY
Qty: 30 TABLET | Refills: 11 | Status: SHIPPED | OUTPATIENT
Start: 2022-01-31 | End: 2022-04-08

## 2022-01-31 RX ORDER — INDOMETHACIN 50 MG/1
50 CAPSULE ORAL
Qty: 60 CAPSULE | Refills: 1 | Status: SHIPPED | OUTPATIENT
Start: 2022-01-31 | End: 2022-04-08 | Stop reason: SDUPTHER

## 2022-01-31 RX ORDER — LOSARTAN POTASSIUM AND HYDROCHLOROTHIAZIDE 12.5; 1 MG/1; MG/1
1 TABLET ORAL DAILY
Qty: 90 TABLET | Refills: 3 | Status: SHIPPED | OUTPATIENT
Start: 2022-01-31 | End: 2022-05-09 | Stop reason: SDUPTHER

## 2022-01-31 NOTE — PROGRESS NOTES
INTERNAL MEDICINE ESTABLISHED PATIENT VISIT NOTE        Assessment/Plan     1. Subclinical hyperthyroidism  Asymptomatic, will recheck labs in 2-3 months. Discussed progression of hashimotos thyroiditis.   - TSH; Future  - T4, Free; Future    2. Prediabetes  Congratulated patient on good diet, will recheck in a few months.  - Hemoglobin A1C; Future    3. Essential hypertension  Not at goal, will augment therapy.   - losartan-hydrochlorothiazide 100-12.5 mg (HYZAAR) 100-12.5 mg Tab; Take 1 tablet by mouth once daily.  Dispense: 90 tablet; Refill: 3    4. Chronic idiopathic gout involving toe of right foot without tophus  Increased gout flares recently. Previously had SE to allopurinol 300 mg (abdominal pain and reports increased flares, but also with bad diet), and febuxostat was tolerated (while living in [place]), but expensive. Will re-try allopurinol at low dose, prescribed extra indomethasin.   - Uric Acid; Future  - allopurinoL (ZYLOPRIM) 100 MG tablet; Take 1 tablet (100 mg total) by mouth once daily.  Dispense: 30 tablet; Refill: 11  - indomethacin (INDOCIN) 50 MG capsule; Take 1 capsule (50 mg total) by mouth every meal as needed (For acute flare-up of gout).  Dispense: 60 capsule; Refill: 1    5. Fatigue, unspecified type  - Testosterone; Future  - Semen Analysis; Future    6. Erectile dysfunction, unspecified erectile dysfunction type  - Semen Analysis; Future    7. Depression, unspecified depression type  Likely to be contributing to his depression, fatigue, and ED (difficulty maintaining an erection). Will check T levels and trial wellbutrin.   - buPROPion (WELLBUTRIN XL) 150 MG TB24 tablet; Take 1 tablet (150 mg total) by mouth once daily.  Dispense: 30 tablet; Refill: 11    8. Insomnia, unspecified type  Likely 2/2 to depression. Will treat for likely underlying etiology.     9. Colon cancer screening  - Fecal Immunochemical Test (iFOBT); Future      Health Maintenance reviewed and discussed with patient.  "  RTC in 2-3 months, sooner if needed.    Subjective:     Chief Complaint: Follow-up       Patient ID: Jose Fox is a 46 y.o. male with gout, hypertension, subclinical hyperthyroidism, prediabetes who is here to discuss 3 month follow up.       Patient is here for 3 month follow up.     Prediabetes - Since he last saw me, he has been working out 4-5 days per week, has been looking for appropriate substitutes for sugars to avoid diabetes, is lifting weights 3 days per week.     Insomnia - reports his sleep is "horrible" - trouble staying asleep, wakes up and cant go back to sleep. Worries about his son, has a lot of stress. Feels like he is a bit depressed. Away from his family.     Subclinical hyperthyroidism - asymptomatic. We reviewed the progression of Hashimotos disease and the need to monitor for symptoms and check labs every few months or so.     Gout - has been trying to avoid excess protein. Recently has been increasing flares in his knees and hands and right elbow. Has had to increase to 3-4 indomethacin per day (but also using for tendinitis of the R elbow). Used to take allopurinol for about 1 year.     HTN -elevated today. Doesn't check at home.             Past Medical History:  Past Medical History:   Diagnosis Date    Gout     Hypertension     Hypothyroidism     Thyroid disease        Home Medications:  Prior to Admission medications    Medication Sig Start Date End Date Taking? Authorizing Provider   indomethacin (INDOCIN) 50 MG capsule Take 1 capsule (50 mg total) by mouth every meal as needed (For acute flare-up of gout). 12/27/21   Eloisa Quintero MD   losartan (COZAAR) 100 MG tablet Take 1 tablet (100 mg total) by mouth once daily. 10/29/21   Alee Faulkner MD       Allergies:  Review of patient's allergies indicates:  No Active Allergies    Social History:  Social History     Tobacco Use    Smoking status: Never Smoker    Smokeless tobacco: Never Used   Substance Use Topics    " "Alcohol use: Never    Drug use: Never        Review of Systems   Constitutional: Positive for fatigue. Negative for fever and unexpected weight change.   HENT: Negative for sinus pressure and sore throat.    Eyes: Negative for visual disturbance.   Respiratory: Negative for cough and shortness of breath.    Cardiovascular: Negative for chest pain and palpitations.   Gastrointestinal: Negative for constipation, diarrhea, nausea and vomiting.   Endocrine: Negative for polyuria.   Genitourinary: Negative for dysuria and urgency.   Musculoskeletal: Negative for arthralgias and myalgias.   Skin: Negative for rash.   Allergic/Immunologic: Negative for environmental allergies.   Neurological: Negative for dizziness, light-headedness and headaches.   Hematological: Does not bruise/bleed easily.   Psychiatric/Behavioral: Positive for dysphoric mood. Negative for agitation and decreased concentration. The patient is nervous/anxious.          Health Maintenance:     Health Maintenance Due   Topic Date Due    Hepatitis C Screening  Never done    HIV Screening  Never done    TETANUS VACCINE  Never done    Colorectal Cancer Screening  Never done    COVID-19 Vaccine (3 - Booster for Moderna series) 10/27/2021       Objective:   BP (!) 156/94 (BP Location: Left arm, Patient Position: Sitting, BP Method: Large (Manual))   Pulse 72   Ht 6' 1" (1.854 m)   Wt 118 kg (260 lb 2.3 oz)   SpO2 99%   BMI 34.32 kg/m²        General: AAO x3, no apparent distress, tired-appearing  HEENT: PERRL, OP clear  Neck: Supple   CV: RRR, no m/r/g  Pulm: Lungs CTAB, no crackles, no wheezes  Abd: s/NT/ND +BS  Extremities: appears warm and well-perfused  Skin: no rashes, lesions, or tears    Labs:     Lab Results   Component Value Date    WBC 7.47 10/29/2021    HGB 14.9 10/29/2021    HCT 42.9 10/29/2021     10/29/2021    CHOL 131 10/29/2021    TRIG 70 10/29/2021    HDL 28 (L) 10/29/2021    ALT 25 10/29/2021    AST 20 10/29/2021     " 10/29/2021    K 4.6 10/29/2021     10/29/2021    CREATININE 1.3 10/29/2021    BUN 26 (H) 10/29/2021    CO2 26 10/29/2021    TSH <0.010 (L) 01/24/2022    HGBA1C 6.3 (H) 10/29/2021      CMP  Sodium   Date Value Ref Range Status   10/29/2021 140 136 - 145 mmol/L Final     Potassium   Date Value Ref Range Status   10/29/2021 4.6 3.5 - 5.1 mmol/L Final     Chloride   Date Value Ref Range Status   10/29/2021 106 95 - 110 mmol/L Final     CO2   Date Value Ref Range Status   10/29/2021 26 23 - 29 mmol/L Final     Glucose   Date Value Ref Range Status   10/29/2021 159 (H) 70 - 110 mg/dL Final     BUN   Date Value Ref Range Status   10/29/2021 26 (H) 6 - 20 mg/dL Final     Creatinine   Date Value Ref Range Status   10/29/2021 1.3 0.5 - 1.4 mg/dL Final     Calcium   Date Value Ref Range Status   10/29/2021 9.4 8.7 - 10.5 mg/dL Final     Total Protein   Date Value Ref Range Status   10/29/2021 7.3 6.0 - 8.4 g/dL Final     Albumin   Date Value Ref Range Status   10/29/2021 3.8 3.5 - 5.2 g/dL Final     Total Bilirubin   Date Value Ref Range Status   10/29/2021 1.0 0.1 - 1.0 mg/dL Final     Comment:     For infants and newborns, interpretation of results should be based  on gestational age, weight and in agreement with clinical  observations.    Premature Infant recommended reference ranges:  Up to 24 hours.............<8.0 mg/dL  Up to 48 hours............<12.0 mg/dL  3-5 days..................<15.0 mg/dL  6-29 days.................<15.0 mg/dL       Alkaline Phosphatase   Date Value Ref Range Status   10/29/2021 67 55 - 135 U/L Final     AST   Date Value Ref Range Status   10/29/2021 20 10 - 40 U/L Final     ALT   Date Value Ref Range Status   10/29/2021 25 10 - 44 U/L Final     Anion Gap   Date Value Ref Range Status   10/29/2021 8 8 - 16 mmol/L Final     eGFR if    Date Value Ref Range Status   10/29/2021 >60.0 >60 mL/min/1.73 m^2 Final     eGFR if non    Date Value Ref Range Status    10/29/2021 >60.0 >60 mL/min/1.73 m^2 Final     Comment:     Calculation used to obtain the estimated glomerular filtration  rate (eGFR) is the CKD-EPI equation.        Lab Results   Component Value Date    HGBA1C 6.3 (H) 10/29/2021     Lab Results   Component Value Date    TSH <0.010 (L) 01/24/2022             Alee Faulkner MD   [facility]  Department of Internal Medicine   01/31/2022

## 2022-02-03 ENCOUNTER — LAB VISIT (OUTPATIENT)
Dept: LAB | Facility: HOSPITAL | Age: 47
End: 2022-02-03
Attending: STUDENT IN AN ORGANIZED HEALTH CARE EDUCATION/TRAINING PROGRAM
Payer: COMMERCIAL

## 2022-02-03 DIAGNOSIS — Z12.11 COLON CANCER SCREENING: ICD-10-CM

## 2022-02-03 PROCEDURE — 82274 ASSAY TEST FOR BLOOD FECAL: CPT | Performed by: STUDENT IN AN ORGANIZED HEALTH CARE EDUCATION/TRAINING PROGRAM

## 2022-02-04 ENCOUNTER — TELEPHONE (OUTPATIENT)
Dept: INTERNAL MEDICINE | Facility: CLINIC | Age: 47
End: 2022-02-04
Payer: COMMERCIAL

## 2022-02-04 DIAGNOSIS — N52.9 ERECTILE DYSFUNCTION, UNSPECIFIED ERECTILE DYSFUNCTION TYPE: Primary | ICD-10-CM

## 2022-02-04 NOTE — TELEPHONE ENCOUNTER
----- Message from Carli Burnham MA sent at 2/3/2022  9:16 AM CST -----  Regarding: Specimen  Patient dropped off specimen at wrong location. Need to be recollected.    S[facility]  specimen must be delivered to [facility]- must inform registration type of specimen.   Specimen only good for 1hr after collection

## 2022-02-04 NOTE — TELEPHONE ENCOUNTER
New order made. Please reach out to patient to reschedule specimen collection and inform he must drop off to [facility].

## 2022-02-15 ENCOUNTER — TELEPHONE (OUTPATIENT)
Dept: ADMINISTRATIVE | Facility: OTHER | Age: 47
End: 2022-02-15
Payer: COMMERCIAL

## 2022-02-15 DIAGNOSIS — Z12.11 ENCOUNTER FOR FIT (FECAL IMMUNOCHEMICAL TEST) SCREENING: Primary | ICD-10-CM

## 2022-02-22 ENCOUNTER — PATIENT MESSAGE (OUTPATIENT)
Dept: INTERNAL MEDICINE | Facility: CLINIC | Age: 47
End: 2022-02-22
Payer: COMMERCIAL

## 2022-02-23 ENCOUNTER — PATIENT MESSAGE (OUTPATIENT)
Dept: ADMINISTRATIVE | Facility: OTHER | Age: 47
End: 2022-02-23
Payer: COMMERCIAL

## 2022-02-23 ENCOUNTER — TELEPHONE (OUTPATIENT)
Dept: ADMINISTRATIVE | Facility: OTHER | Age: 47
End: 2022-02-23
Payer: COMMERCIAL

## 2022-02-24 LAB — HEMOCCULT STL QL IA: NEGATIVE

## 2022-03-08 ENCOUNTER — PATIENT MESSAGE (OUTPATIENT)
Dept: INTERNAL MEDICINE | Facility: CLINIC | Age: 47
End: 2022-03-08
Payer: COMMERCIAL

## 2022-03-10 ENCOUNTER — PATIENT MESSAGE (OUTPATIENT)
Dept: INTERNAL MEDICINE | Facility: CLINIC | Age: 47
End: 2022-03-10
Payer: COMMERCIAL

## 2022-03-10 DIAGNOSIS — R73.03 PREDIABETES: Primary | ICD-10-CM

## 2022-03-10 DIAGNOSIS — E66.9 CLASS 1 OBESITY WITH BODY MASS INDEX (BMI) OF 34.0 TO 34.9 IN ADULT, UNSPECIFIED OBESITY TYPE, UNSPECIFIED WHETHER SERIOUS COMORBIDITY PRESENT: ICD-10-CM

## 2022-03-11 ENCOUNTER — PATIENT MESSAGE (OUTPATIENT)
Dept: INTERNAL MEDICINE | Facility: CLINIC | Age: 47
End: 2022-03-11
Payer: COMMERCIAL

## 2022-03-11 RX ORDER — SEMAGLUTIDE 1.34 MG/ML
0.25 INJECTION, SOLUTION SUBCUTANEOUS
Qty: 1 PEN | Refills: 0 | Status: SHIPPED | OUTPATIENT
Start: 2022-03-11 | End: 2022-03-18 | Stop reason: SDUPTHER

## 2022-03-18 ENCOUNTER — PATIENT MESSAGE (OUTPATIENT)
Dept: INTERNAL MEDICINE | Facility: CLINIC | Age: 47
End: 2022-03-18
Payer: COMMERCIAL

## 2022-03-18 DIAGNOSIS — E66.9 CLASS 1 OBESITY WITH BODY MASS INDEX (BMI) OF 34.0 TO 34.9 IN ADULT, UNSPECIFIED OBESITY TYPE, UNSPECIFIED WHETHER SERIOUS COMORBIDITY PRESENT: ICD-10-CM

## 2022-03-18 DIAGNOSIS — R73.03 PREDIABETES: ICD-10-CM

## 2022-03-18 DIAGNOSIS — F32.A DEPRESSION, UNSPECIFIED DEPRESSION TYPE: ICD-10-CM

## 2022-03-18 NOTE — TELEPHONE ENCOUNTER
Care Due:                  Date            Visit Type   Department     Provider  --------------------------------------------------------------------------------                                EP -                              PRIMARY      [facility] INTER[facility]  Last Visit: 01-      CARE ([facility])   MEDICINE       Alee Faulkner  Next Visit: None Scheduled  None         None Found                                                            Last  Test          Frequency    Reason                     Performed    Due Date  --------------------------------------------------------------------------------    HBA1C.......  6 months...  semaglutide..............  10-   04-    Uric Acid...  12 months..  allopurinoL..............  05- 05-    Powered by [employer]. Reference number: [id number].   3/18/2022 4:36:08 PM CDT

## 2022-03-19 RX ORDER — SEMAGLUTIDE 1.34 MG/ML
0.25 INJECTION, SOLUTION SUBCUTANEOUS
Qty: 1 PEN | Refills: 0 | Status: SHIPPED | OUTPATIENT
Start: 2022-03-19 | End: 2022-04-08 | Stop reason: SDUPTHER

## 2022-03-31 ENCOUNTER — LAB VISIT (OUTPATIENT)
Dept: LAB | Facility: HOSPITAL | Age: 47
End: 2022-03-31
Attending: STUDENT IN AN ORGANIZED HEALTH CARE EDUCATION/TRAINING PROGRAM
Payer: COMMERCIAL

## 2022-03-31 DIAGNOSIS — M1A.0710 CHRONIC IDIOPATHIC GOUT INVOLVING TOE OF RIGHT FOOT WITHOUT TOPHUS: ICD-10-CM

## 2022-03-31 DIAGNOSIS — E05.90 SUBCLINICAL HYPERTHYROIDISM: ICD-10-CM

## 2022-03-31 DIAGNOSIS — R73.03 PREDIABETES: ICD-10-CM

## 2022-03-31 LAB
ESTIMATED AVG GLUCOSE: 123 MG/DL (ref 68–131)
HBA1C MFR BLD: 5.9 % (ref 4–5.6)
T4 FREE SERPL-MCNC: 1.27 NG/DL (ref 0.71–1.51)
TSH SERPL DL<=0.005 MIU/L-ACNC: 0.07 UIU/ML (ref 0.4–4)
URATE SERPL-MCNC: 11.8 MG/DL (ref 3.4–7)

## 2022-03-31 PROCEDURE — 83036 HEMOGLOBIN GLYCOSYLATED A1C: CPT | Performed by: STUDENT IN AN ORGANIZED HEALTH CARE EDUCATION/TRAINING PROGRAM

## 2022-03-31 PROCEDURE — 84439 ASSAY OF FREE THYROXINE: CPT | Performed by: STUDENT IN AN ORGANIZED HEALTH CARE EDUCATION/TRAINING PROGRAM

## 2022-03-31 PROCEDURE — 84550 ASSAY OF BLOOD/URIC ACID: CPT | Performed by: STUDENT IN AN ORGANIZED HEALTH CARE EDUCATION/TRAINING PROGRAM

## 2022-03-31 PROCEDURE — 36415 COLL VENOUS BLD VENIPUNCTURE: CPT | Performed by: STUDENT IN AN ORGANIZED HEALTH CARE EDUCATION/TRAINING PROGRAM

## 2022-03-31 PROCEDURE — 84443 ASSAY THYROID STIM HORMONE: CPT | Performed by: STUDENT IN AN ORGANIZED HEALTH CARE EDUCATION/TRAINING PROGRAM

## 2022-04-04 ENCOUNTER — PATIENT MESSAGE (OUTPATIENT)
Dept: INTERNAL MEDICINE | Facility: CLINIC | Age: 47
End: 2022-04-04
Payer: COMMERCIAL

## 2022-04-05 ENCOUNTER — PATIENT MESSAGE (OUTPATIENT)
Dept: INTERNAL MEDICINE | Facility: CLINIC | Age: 47
End: 2022-04-05
Payer: COMMERCIAL

## 2022-04-05 ENCOUNTER — PATIENT MESSAGE (OUTPATIENT)
Dept: ADMINISTRATIVE | Facility: OTHER | Age: 47
End: 2022-04-05
Payer: COMMERCIAL

## 2022-04-08 ENCOUNTER — OFFICE VISIT (OUTPATIENT)
Dept: INTERNAL MEDICINE | Facility: CLINIC | Age: 47
End: 2022-04-08
Payer: COMMERCIAL

## 2022-04-08 DIAGNOSIS — M10.071 IDIOPATHIC GOUT INVOLVING TOE OF RIGHT FOOT, UNSPECIFIED CHRONICITY: Primary | ICD-10-CM

## 2022-04-08 DIAGNOSIS — M1A.0710 CHRONIC IDIOPATHIC GOUT INVOLVING TOE OF RIGHT FOOT WITHOUT TOPHUS: ICD-10-CM

## 2022-04-08 DIAGNOSIS — E66.9 CLASS 1 OBESITY WITH BODY MASS INDEX (BMI) OF 34.0 TO 34.9 IN ADULT, UNSPECIFIED OBESITY TYPE, UNSPECIFIED WHETHER SERIOUS COMORBIDITY PRESENT: ICD-10-CM

## 2022-04-08 DIAGNOSIS — R73.03 PREDIABETES: ICD-10-CM

## 2022-04-08 PROCEDURE — 99442 PR PHYSICIAN TELEPHONE EVALUATION 11-20 MIN: ICD-10-PCS | Mod: 95,,, | Performed by: STUDENT IN AN ORGANIZED HEALTH CARE EDUCATION/TRAINING PROGRAM

## 2022-04-08 PROCEDURE — 99442 PR PHYSICIAN TELEPHONE EVALUATION 11-20 MIN: CPT | Mod: 95,,, | Performed by: STUDENT IN AN ORGANIZED HEALTH CARE EDUCATION/TRAINING PROGRAM

## 2022-04-08 PROCEDURE — 3044F HG A1C LEVEL LT 7.0%: CPT | Mod: CPTII,95,, | Performed by: STUDENT IN AN ORGANIZED HEALTH CARE EDUCATION/TRAINING PROGRAM

## 2022-04-08 PROCEDURE — 1159F MED LIST DOCD IN RCRD: CPT | Mod: CPTII,95,, | Performed by: STUDENT IN AN ORGANIZED HEALTH CARE EDUCATION/TRAINING PROGRAM

## 2022-04-08 PROCEDURE — 3044F PR MOST RECENT HEMOGLOBIN A1C LEVEL <7.0%: ICD-10-PCS | Mod: CPTII,95,, | Performed by: STUDENT IN AN ORGANIZED HEALTH CARE EDUCATION/TRAINING PROGRAM

## 2022-04-08 PROCEDURE — 1160F RVW MEDS BY RX/DR IN RCRD: CPT | Mod: CPTII,95,, | Performed by: STUDENT IN AN ORGANIZED HEALTH CARE EDUCATION/TRAINING PROGRAM

## 2022-04-08 PROCEDURE — 1159F PR MEDICATION LIST DOCUMENTED IN MEDICAL RECORD: ICD-10-PCS | Mod: CPTII,95,, | Performed by: STUDENT IN AN ORGANIZED HEALTH CARE EDUCATION/TRAINING PROGRAM

## 2022-04-08 PROCEDURE — 1160F PR REVIEW ALL MEDS BY PRESCRIBER/CLIN PHARMACIST DOCUMENTED: ICD-10-PCS | Mod: CPTII,95,, | Performed by: STUDENT IN AN ORGANIZED HEALTH CARE EDUCATION/TRAINING PROGRAM

## 2022-04-08 RX ORDER — INDOMETHACIN 50 MG/1
50 CAPSULE ORAL
Qty: 90 CAPSULE | Refills: 3 | Status: SHIPPED | OUTPATIENT
Start: 2022-04-08 | End: 2022-05-09 | Stop reason: SDUPTHER

## 2022-04-08 RX ORDER — SEMAGLUTIDE 1.34 MG/ML
0.5 INJECTION, SOLUTION SUBCUTANEOUS
Qty: 1 PEN | Refills: 1 | Status: SHIPPED | OUTPATIENT
Start: 2022-04-08 | End: 2022-05-09

## 2022-04-08 RX ORDER — FEBUXOSTAT 40 MG/1
40 TABLET, FILM COATED ORAL DAILY
Qty: 90 TABLET | Refills: 3 | Status: SHIPPED | OUTPATIENT
Start: 2022-04-08 | End: 2023-03-07 | Stop reason: SDUPTHER

## 2022-04-08 NOTE — PROGRESS NOTES
Established Patient - Audio Only Telehealth Visit     The patient location is: [place]  The chief complaint leading to consultation is: gout  Visit type: Virtual visit with audio only (telephone)  Total time spent with patient: 10       The reason for the audio only service rather than synchronous audio and video virtual visit was related to technical difficulties or patient preference/necessity.     Each patient to whom I provide medical services by telemedicine is:  (1) informed of the relationship between the physician and patient and the respective role of any other health care provider with respect to management of the patient; and (2) notified that they may decline to receive medical services by telemedicine and may withdraw from such care at any time. Patient verbally consented to receive this service via voice-only telephone call.       HPI: Patient is calling in to discuss lab results. His Uric Acid level was still >11 after starting allopurinol 100 mg daily again. We discussed his issues with allopurinol in the past (abdominal pain/cramping), but also had bad diet at the time. Did not seem to reduce flares, highest dose tried was 300 mg. He was on febuxostat which worked well, but was not cost-effective. Is willing to try that again given his change in insurance and more frequent gout flares.     Also discussed weight loss and doing well on ozempic. No major side effects. Encouraged to exercise and eat a healthy regimen      Assessment and plan:    #Gout - start feboxostat for ppx   #Obesity - continue ozempic weekly injections                       This service was not originating from a related E/M service provided within the previous 7 days nor will  to an E/M service or procedure within the next 24 hours or my soonest available appointment.  Prevailing standard of care was able to be met in this audio-only visit.

## 2022-04-26 ENCOUNTER — PATIENT MESSAGE (OUTPATIENT)
Dept: INTERNAL MEDICINE | Facility: CLINIC | Age: 47
End: 2022-04-26
Payer: COMMERCIAL

## 2022-04-26 DIAGNOSIS — M54.42 LOW BACK PAIN WITH LEFT-SIDED SCIATICA, UNSPECIFIED BACK PAIN LATERALITY, UNSPECIFIED CHRONICITY: ICD-10-CM

## 2022-04-27 ENCOUNTER — OFFICE VISIT (OUTPATIENT)
Dept: INTERNAL MEDICINE | Facility: CLINIC | Age: 47
End: 2022-04-27
Payer: COMMERCIAL

## 2022-04-27 VITALS
DIASTOLIC BLOOD PRESSURE: 80 MMHG | SYSTOLIC BLOOD PRESSURE: 140 MMHG | WEIGHT: 250.69 LBS | HEIGHT: 73 IN | BODY MASS INDEX: 33.22 KG/M2 | OXYGEN SATURATION: 98 % | HEART RATE: 78 BPM

## 2022-04-27 DIAGNOSIS — M54.42 LOW BACK PAIN WITH LEFT-SIDED SCIATICA, UNSPECIFIED BACK PAIN LATERALITY, UNSPECIFIED CHRONICITY: Primary | ICD-10-CM

## 2022-04-27 PROCEDURE — 3008F PR BODY MASS INDEX (BMI) DOCUMENTED: ICD-10-PCS | Mod: CPTII,S$GLB,, | Performed by: INTERNAL MEDICINE

## 2022-04-27 PROCEDURE — 3044F HG A1C LEVEL LT 7.0%: CPT | Mod: CPTII,S$GLB,, | Performed by: INTERNAL MEDICINE

## 2022-04-27 PROCEDURE — 3044F PR MOST RECENT HEMOGLOBIN A1C LEVEL <7.0%: ICD-10-PCS | Mod: CPTII,S$GLB,, | Performed by: INTERNAL MEDICINE

## 2022-04-27 PROCEDURE — 99213 OFFICE O/P EST LOW 20 MIN: CPT | Mod: S$GLB,,, | Performed by: INTERNAL MEDICINE

## 2022-04-27 PROCEDURE — 99999 PR PBB SHADOW E&M-EST. PATIENT-LVL IV: CPT | Mod: PBBFAC,,, | Performed by: INTERNAL MEDICINE

## 2022-04-27 PROCEDURE — 99213 PR OFFICE/OUTPT VISIT, EST, LEVL III, 20-29 MIN: ICD-10-PCS | Mod: S$GLB,,, | Performed by: INTERNAL MEDICINE

## 2022-04-27 PROCEDURE — 1159F MED LIST DOCD IN RCRD: CPT | Mod: CPTII,S$GLB,, | Performed by: INTERNAL MEDICINE

## 2022-04-27 PROCEDURE — 1160F RVW MEDS BY RX/DR IN RCRD: CPT | Mod: CPTII,S$GLB,, | Performed by: INTERNAL MEDICINE

## 2022-04-27 PROCEDURE — 3079F PR MOST RECENT DIASTOLIC BLOOD PRESSURE 80-89 MM HG: ICD-10-PCS | Mod: CPTII,S$GLB,, | Performed by: INTERNAL MEDICINE

## 2022-04-27 PROCEDURE — 3077F PR MOST RECENT SYSTOLIC BLOOD PRESSURE >= 140 MM HG: ICD-10-PCS | Mod: CPTII,S$GLB,, | Performed by: INTERNAL MEDICINE

## 2022-04-27 PROCEDURE — 99999 PR PBB SHADOW E&M-EST. PATIENT-LVL IV: ICD-10-PCS | Mod: PBBFAC,,, | Performed by: INTERNAL MEDICINE

## 2022-04-27 PROCEDURE — 3008F BODY MASS INDEX DOCD: CPT | Mod: CPTII,S$GLB,, | Performed by: INTERNAL MEDICINE

## 2022-04-27 PROCEDURE — 3079F DIAST BP 80-89 MM HG: CPT | Mod: CPTII,S$GLB,, | Performed by: INTERNAL MEDICINE

## 2022-04-27 PROCEDURE — 3077F SYST BP >= 140 MM HG: CPT | Mod: CPTII,S$GLB,, | Performed by: INTERNAL MEDICINE

## 2022-04-27 PROCEDURE — 1159F PR MEDICATION LIST DOCUMENTED IN MEDICAL RECORD: ICD-10-PCS | Mod: CPTII,S$GLB,, | Performed by: INTERNAL MEDICINE

## 2022-04-27 PROCEDURE — 1160F PR REVIEW ALL MEDS BY PRESCRIBER/CLIN PHARMACIST DOCUMENTED: ICD-10-PCS | Mod: CPTII,S$GLB,, | Performed by: INTERNAL MEDICINE

## 2022-04-27 RX ORDER — CYCLOBENZAPRINE HCL 5 MG
5 TABLET ORAL 3 TIMES DAILY PRN
Qty: 30 TABLET | Refills: 1 | Status: SHIPPED | OUTPATIENT
Start: 2022-04-27 | End: 2022-05-07

## 2022-04-27 RX ORDER — MELOXICAM 15 MG/1
15 TABLET ORAL DAILY PRN
Qty: 14 TABLET | Refills: 0 | Status: SHIPPED | OUTPATIENT
Start: 2022-04-27 | End: 2022-05-09

## 2022-04-27 NOTE — PROGRESS NOTES
"Subjective:       Patient ID: Jose Fox is a 47 y.o. male.    Chief Complaint: Low-back Pain  This is a 47-year-old who presents today for urgent visit with concerns about lower back discomfort he reports that he has previously had issues with his back in the past a few years ago had some trouble he reports that he was lifting some weights over the weekend and when he came up in a certain position he felt a spasm and has had some discomfort in his lower back and left sciatic regions since that time .  He has used an ice pack which has been helpful and he had some Indocin that he took because he uses that for gout but his symptoms have persisted he reports a bit of improvement since his initial episode  He works as a  and is also requesting work note until he has improvement in his spasms  .  HPI  Review of Systems   Genitourinary: Negative for dysuria and flank pain.   Musculoskeletal: Positive for back pain.        Left side history prior issues        Objective:     Blood pressure (!) 140/80, pulse 78, height 6' 1" (1.854 m), weight 113.7 kg (250 lb 10.6 oz), SpO2 98 %.    Physical Exam  Constitutional:       General: He is not in acute distress.  Cardiovascular:      Rate and Rhythm: Normal rate and regular rhythm.      Heart sounds: Normal heart sounds. No murmur heard.    No friction rub. No gallop.   Pulmonary:      Effort: Pulmonary effort is normal. No respiratory distress.      Breath sounds: Normal breath sounds.   Abdominal:      General: Bowel sounds are normal.      Palpations: Abdomen is soft. There is no mass.      Tenderness: There is no abdominal tenderness.   Musculoskeletal:      Comments: Some discomfort postiional changes   Left side with rom    Skin:     Findings: No erythema.   Neurological:      Mental Status: He is alert.      Coordination: Coordination normal.      Deep Tendon Reflexes: Reflexes normal.         Assessment:       1. Low back pain with left-sided sciatica, " unspecified back pain laterality, unspecified chronicity        Plan:       Jose was seen today for low-back pain.    Diagnoses and all orders for this visit:    Low back pain with left-sided sciatica, unspecified back pain laterality, unspecified chronicity  Discussed conservative measures he can continue with ice pack her moist heat a refill of Flexeril which he has used in the past precautions sedation reviewed  -     cyclobenzaprine (FLEXERIL) 5 MG tablet; Take 1 tablet (5 mg total) by mouth 3 (three) times daily as needed for Muscle spasms.  For information a trial of meloxicam for up to 2 weeks if needed he will not take his indomethacin while taking meloxicam discussed patient aware  -     meloxicam (MOBIC) 15 MG tablet; Take 1 tablet (15 mg total) by mouth daily as needed for Pain.  -     Ambulatory referral/consult to Physical/Occupational Therapy; Future due to intermittent episodes recommended a course of physical therapy referral was placed for him to proceed    Will follow-up with his PCP as recommended and sooner if no improvement or increased concerns  Work note was provided

## 2022-04-27 NOTE — LETTER
April 27, 2022    Jose Ambrocio  [address] [facility] [place] [address]             [facility]  [address]  [facility] [address]  Phone: [phone]  Fax: [phone] Dear Mr. Ambrocio:    Please excuse mr ambrocio from work   4/27/2022, 4/28/2022 , 4/29.2022, 4/30/2022 and 5/1/2022  He may  return on 5/2/2022       If you have any questions or concerns, please don't hesitate to call.    Sincerely,        Olivia Campoverde MD

## 2022-04-28 RX ORDER — CYCLOBENZAPRINE HCL 10 MG
10 TABLET ORAL 3 TIMES DAILY PRN
Qty: 30 TABLET | Refills: 0 | Status: SHIPPED | OUTPATIENT
Start: 2022-04-28 | End: 2022-05-08

## 2022-04-29 ENCOUNTER — OFFICE VISIT (OUTPATIENT)
Dept: URGENT CARE | Facility: CLINIC | Age: 47
End: 2022-04-29
Payer: COMMERCIAL

## 2022-04-29 VITALS
RESPIRATION RATE: 20 BRPM | SYSTOLIC BLOOD PRESSURE: 138 MMHG | DIASTOLIC BLOOD PRESSURE: 84 MMHG | HEART RATE: 93 BPM | OXYGEN SATURATION: 97 % | TEMPERATURE: 98 F

## 2022-04-29 DIAGNOSIS — M25.521 RIGHT ELBOW PAIN: ICD-10-CM

## 2022-04-29 DIAGNOSIS — M25.561 RIGHT KNEE PAIN, UNSPECIFIED CHRONICITY: ICD-10-CM

## 2022-04-29 DIAGNOSIS — R52 PAIN: ICD-10-CM

## 2022-04-29 DIAGNOSIS — M25.511 ACUTE PAIN OF RIGHT SHOULDER: Primary | ICD-10-CM

## 2022-04-29 PROCEDURE — 73030 XR SHOULDER COMPLETE 2 OR MORE VIEWS RIGHT: ICD-10-PCS | Mod: FY,RT,S$GLB, | Performed by: RADIOLOGY

## 2022-04-29 PROCEDURE — 96372 THER/PROPH/DIAG INJ SC/IM: CPT | Mod: S$GLB,,, | Performed by: FAMILY MEDICINE

## 2022-04-29 PROCEDURE — 1160F RVW MEDS BY RX/DR IN RCRD: CPT | Mod: CPTII,S$GLB,, | Performed by: NURSE PRACTITIONER

## 2022-04-29 PROCEDURE — 99214 OFFICE O/P EST MOD 30 MIN: CPT | Mod: 25,S$GLB,, | Performed by: NURSE PRACTITIONER

## 2022-04-29 PROCEDURE — 1160F PR REVIEW ALL MEDS BY PRESCRIBER/CLIN PHARMACIST DOCUMENTED: ICD-10-PCS | Mod: CPTII,S$GLB,, | Performed by: NURSE PRACTITIONER

## 2022-04-29 PROCEDURE — 99214 PR OFFICE/OUTPT VISIT, EST, LEVL IV, 30-39 MIN: ICD-10-PCS | Mod: 25,S$GLB,, | Performed by: NURSE PRACTITIONER

## 2022-04-29 PROCEDURE — 1159F MED LIST DOCD IN RCRD: CPT | Mod: CPTII,S$GLB,, | Performed by: NURSE PRACTITIONER

## 2022-04-29 PROCEDURE — 73030 X-RAY EXAM OF SHOULDER: CPT | Mod: FY,RT,S$GLB, | Performed by: RADIOLOGY

## 2022-04-29 PROCEDURE — 3044F HG A1C LEVEL LT 7.0%: CPT | Mod: CPTII,S$GLB,, | Performed by: NURSE PRACTITIONER

## 2022-04-29 PROCEDURE — 1159F PR MEDICATION LIST DOCUMENTED IN MEDICAL RECORD: ICD-10-PCS | Mod: CPTII,S$GLB,, | Performed by: NURSE PRACTITIONER

## 2022-04-29 PROCEDURE — 3079F PR MOST RECENT DIASTOLIC BLOOD PRESSURE 80-89 MM HG: ICD-10-PCS | Mod: CPTII,S$GLB,, | Performed by: NURSE PRACTITIONER

## 2022-04-29 PROCEDURE — 3079F DIAST BP 80-89 MM HG: CPT | Mod: CPTII,S$GLB,, | Performed by: NURSE PRACTITIONER

## 2022-04-29 PROCEDURE — 3075F SYST BP GE 130 - 139MM HG: CPT | Mod: CPTII,S$GLB,, | Performed by: NURSE PRACTITIONER

## 2022-04-29 PROCEDURE — 96372 PR INJECTION,THERAP/PROPH/DIAG2ST, IM OR SUBCUT: ICD-10-PCS | Mod: S$GLB,,, | Performed by: FAMILY MEDICINE

## 2022-04-29 PROCEDURE — 73562 X-RAY EXAM OF KNEE 3: CPT | Mod: FY,RT,S$GLB, | Performed by: RADIOLOGY

## 2022-04-29 PROCEDURE — 73562 XR KNEE 3 VIEW RIGHT: ICD-10-PCS | Mod: FY,RT,S$GLB, | Performed by: RADIOLOGY

## 2022-04-29 PROCEDURE — 3044F PR MOST RECENT HEMOGLOBIN A1C LEVEL <7.0%: ICD-10-PCS | Mod: CPTII,S$GLB,, | Performed by: NURSE PRACTITIONER

## 2022-04-29 PROCEDURE — 73080 X-RAY EXAM OF ELBOW: CPT | Mod: FY,RT,S$GLB, | Performed by: RADIOLOGY

## 2022-04-29 PROCEDURE — 73080 XR ELBOW COMPLETE 3 VIEW RIGHT: ICD-10-PCS | Mod: FY,RT,S$GLB, | Performed by: RADIOLOGY

## 2022-04-29 PROCEDURE — 3075F PR MOST RECENT SYSTOLIC BLOOD PRESS GE 130-139MM HG: ICD-10-PCS | Mod: CPTII,S$GLB,, | Performed by: NURSE PRACTITIONER

## 2022-04-29 RX ORDER — KETOROLAC TROMETHAMINE 30 MG/ML
30 INJECTION, SOLUTION INTRAMUSCULAR; INTRAVENOUS
Status: COMPLETED | OUTPATIENT
Start: 2022-04-29 | End: 2022-04-29

## 2022-04-29 RX ADMIN — KETOROLAC TROMETHAMINE 30 MG: 30 INJECTION, SOLUTION INTRAMUSCULAR; INTRAVENOUS at 07:04

## 2022-04-29 NOTE — PROGRESS NOTES
Subjective:       Patient ID: Jose Fox is a 47 y.o. male.    Vitals:  temperature is 98.4 °F (36.9 °C). His blood pressure is 138/84 and his pulse is 93. His respiration is 20 and oxygen saturation is 97%.     Chief Complaint: Knee Pain    This is a 47 y.o. male   who presents today with a chief complaint of right knee and right shoulder pain that began two days ago. He denies injury. He's complaining of right knee swelling. He was seen two days ago by his pcp and was prescribed a muscle relaxer for back pain. He's been taking advil and using heat to help relieve his symptoms.     Knee Pain   The incident occurred 2 days ago. There was no injury mechanism. The pain is present in the right knee. The pain is at a severity of 8/10. The pain is severe. Associated symptoms include an inability to bear weight. He reports no foreign bodies present. The symptoms are aggravated by weight bearing. He has tried heat (advil) for the symptoms. The treatment provided no relief.       Musculoskeletal: Positive for pain.       Objective:      Physical Exam   Constitutional:  Non-toxic appearance. He does not appear ill. No distress.   HENT:   Head: Normocephalic and atraumatic.   Ears:   Right Ear: External ear normal.   Left Ear: External ear normal.   Eyes:      extraocular movement intact   Pulmonary/Chest: Effort normal and breath sounds normal.   Abdominal: Normal appearance.   Musculoskeletal:         General: Tenderness present.      Right shoulder: Normal.      Left shoulder: Normal.      Right elbow: Normal.     Left elbow: Normal.      Right knee: He exhibits no effusion, no deformity, normal alignment, no bony tenderness and normal meniscus. Tenderness found.      Left knee: Normal.   Neurological: no focal deficit. He is alert.   Skin: Skin is not diaphoretic. Capillary refill takes less than 2 seconds.   Nursing note and vitals reviewed.           XR SHOULDER COMPLETE 2 OR MORE VIEWS RIGHT    Result Date:  4/29/2022  EXAMINATION: XR SHOULDER COMPLETE 2 OR MORE VIEWS RIGHT CLINICAL HISTORY: Pain, unspecified TECHNIQUE: Two or three views of the right shoulder were performed. COMPARISON: None FINDINGS: Three views right shoulder. The right humeral head maintains appropriate relationship with the glenoid.  The acromioclavicular joint is intact noting degenerative changes.  No visualized acute displaced rib fracture.  The left lung zones are clear.     1. No acute displaced fracture or dislocation of the right shoulder. Electronically signed by: Eagle Gordon MD Date:    04/29/2022 Time:    19:24    XR ELBOW COMPLETE 3 VIEW RIGHT    Result Date: 4/29/2022  EXAMINATION: XR ELBOW COMPLETE 3 VIEW RIGHT CLINICAL HISTORY: . Pain, unspecified TECHNIQUE: AP, lateral, and oblique views of the right elbow were performed. COMPARISON: None FINDINGS: Three views right elbow. No significant displacement of the anterior or posterior elbow fat pads.  The anterior humeral line and radiocapitellar line are in appropriate orientation.  Degenerative changes are noted of the olecranon.  There is edema about the olecranon.     1. No acute displaced fracture or dislocation of the elbow noting nonspecific edema about the olecranon. Electronically signed by: Eagle Gordon MD Date:    04/29/2022 Time:    19:25    XR KNEE 3 VIEW RIGHT    Result Date: 4/29/2022  EXAMINATION: XR KNEE 3 VIEW RIGHT CLINICAL HISTORY: Pain, unspecified TECHNIQUE: AP, lateral, and Merchant views of the right knee were performed. COMPARISON: None FINDINGS: Three views right knee. Degenerative changes are noted of the knee.  No acute displaced fracture or dislocation of the knee.  No radiopaque foreign body.  There is a mild suprapatellar effusion.     1. Mild suprapatellar effusion, no convincing acute displaced fracture or dislocation of the knee. Electronically signed by: Eagle Gordon MD Date:    04/29/2022 Time:    19:23  Assessment:       1. Acute pain of right  shoulder    2. Pain    3. Right knee pain, unspecified chronicity    4. Right elbow pain          Plan:     No fracture or dislocations noted on xrays of right elbow right shoulder.   Mild suprapatellar effusion noted on right knee xray.     Acute pain of right shoulder  -     ketorolac injection 30 mg  -     BANDAGE ELASTIC 4IN ACE NS    Pain  -     XR KNEE 3 VIEW RIGHT; Future; Expected date: 04/29/2022  -     XR SHOULDER COMPLETE 2 OR MORE VIEWS RIGHT; Future; Expected date: 04/29/2022  -     XR ELBOW COMPLETE 3 VIEW RIGHT; Future; Expected date: 04/29/2022  -     ketorolac injection 30 mg  -     BANDAGE ELASTIC 4IN ACE NS    Right knee pain, unspecified chronicity  -     ketorolac injection 30 mg  -     BANDAGE ELASTIC 4IN ACE NS    Right elbow pain  -     BANDAGE ELASTIC 4IN ACE NS               Patient Instructions                                Orthopedic Follow up Discharge Instructions    If your condition worsens or fails to improve we recommend that you receive another evaluation at the ER immediately or contact your PCP to discuss your concerns or return here. You must understand that you've received an urgent care treatment only and that you may be released before all your medical problems are known or treated. [facility]   If you were prescribed a narcotic or muscle relaxant do not drive or operate heavy machinery while taking these medications   Tylenol or ibuprofen can also be used as directed for pain unless you have an allergy to them or medical condition such as stomach ulcers, kidney or liver disease or blood thinners etc for which you should not be taking these type of medications.   If you were given a prescription NSAID here do not also take any over the counter NSAID like ibuprofen, aleve, advil, motrin etc   RICE which means rest, ice compression and elevation are helpful.   If you have Low Back Pain and develop bowel or bladder symptoms or  increase pain going down your legs go to the ER immediately.   If you were given a splint wear it at all times.   If you were given crutches use them as we instructed. Do not rest your armpits on the foam pad or you risk compressing the nerves and the vessels there   Follow up with the orthopedist in 1 week if you continue with pain.   Sometimes it can take up to 1 week for stress fractures to show up on an X-ray, and may need reimaging or a CT or MRI of the affected area.

## 2022-04-30 NOTE — PATIENT INSTRUCTIONS
Orthopedic Follow up Discharge Instructions    If your condition worsens or fails to improve we recommend that you receive another evaluation at the ER immediately or contact your PCP to discuss your concerns or return here. You must understand that you've received an urgent care treatment only and that you may be released before all your medical problems are known or treated. You the patient will arrange for [facility] care as instructed.   If you were prescribed a narcotic or muscle relaxant do not drive or operate heavy machinery while taking these medications   Tylenol or ibuprofen can also be used as directed for pain unless you have an allergy to them or medical condition such as stomach ulcers, kidney or liver disease or blood thinners etc for which you should not be taking these type of medications.   If you were given a prescription NSAID here do not also take any over the counter NSAID like ibuprofen, aleve, advil, motrin etc   RICE which means rest, ice compression and elevation are helpful.   If you have Low Back Pain and develop bowel or bladder symptoms or increase pain going down your legs go to the ER immediately.   If you were given a splint wear it at all times.   If you were given crutches use them as we instructed. Do not rest your armpits on the foam pad or you risk compressing the nerves and the vessels there   Follow up with the orthopedist in 1 week if you continue with pain.   Sometimes it can take up to 1 week for stress fractures to show up on an X-ray, and may need reimaging or a CT or MRI of the affected area.

## 2022-05-05 ENCOUNTER — PATIENT MESSAGE (OUTPATIENT)
Dept: INTERNAL MEDICINE | Facility: CLINIC | Age: 47
End: 2022-05-05
Payer: COMMERCIAL

## 2022-05-05 DIAGNOSIS — M25.461 SWOLLEN R KNEE: Primary | ICD-10-CM

## 2022-05-06 RX ORDER — DICLOFENAC SODIUM 10 MG/G
2 GEL TOPICAL 4 TIMES DAILY
Qty: 100 G | Refills: 2 | Status: SHIPPED | OUTPATIENT
Start: 2022-05-06

## 2022-05-09 ENCOUNTER — OFFICE VISIT (OUTPATIENT)
Dept: INTERNAL MEDICINE | Facility: CLINIC | Age: 47
End: 2022-05-09
Payer: COMMERCIAL

## 2022-05-09 VITALS
HEART RATE: 78 BPM | WEIGHT: 247.81 LBS | OXYGEN SATURATION: 99 % | HEIGHT: 73 IN | BODY MASS INDEX: 32.84 KG/M2 | DIASTOLIC BLOOD PRESSURE: 88 MMHG | SYSTOLIC BLOOD PRESSURE: 132 MMHG

## 2022-05-09 DIAGNOSIS — R73.03 PREDIABETES: ICD-10-CM

## 2022-05-09 DIAGNOSIS — M1A.0710 CHRONIC IDIOPATHIC GOUT INVOLVING TOE OF RIGHT FOOT WITHOUT TOPHUS: ICD-10-CM

## 2022-05-09 DIAGNOSIS — E66.9 OBESITY (BMI 30.0-34.9): ICD-10-CM

## 2022-05-09 DIAGNOSIS — F32.A DEPRESSION, UNSPECIFIED DEPRESSION TYPE: ICD-10-CM

## 2022-05-09 DIAGNOSIS — M25.561 ACUTE PAIN OF RIGHT KNEE: Primary | ICD-10-CM

## 2022-05-09 DIAGNOSIS — I10 ESSENTIAL HYPERTENSION: ICD-10-CM

## 2022-05-09 PROBLEM — E66.811 CLASS 1 OBESITY WITH BODY MASS INDEX (BMI) OF 34.0 TO 34.9 IN ADULT: Status: ACTIVE | Noted: 2022-05-09

## 2022-05-09 PROCEDURE — 99214 PR OFFICE/OUTPT VISIT, EST, LEVL IV, 30-39 MIN: ICD-10-PCS | Mod: S$GLB,,, | Performed by: STUDENT IN AN ORGANIZED HEALTH CARE EDUCATION/TRAINING PROGRAM

## 2022-05-09 PROCEDURE — 99999 PR PBB SHADOW E&M-EST. PATIENT-LVL IV: ICD-10-PCS | Mod: PBBFAC,,, | Performed by: STUDENT IN AN ORGANIZED HEALTH CARE EDUCATION/TRAINING PROGRAM

## 2022-05-09 PROCEDURE — 99999 PR PBB SHADOW E&M-EST. PATIENT-LVL IV: CPT | Mod: PBBFAC,,, | Performed by: STUDENT IN AN ORGANIZED HEALTH CARE EDUCATION/TRAINING PROGRAM

## 2022-05-09 PROCEDURE — 3079F DIAST BP 80-89 MM HG: CPT | Mod: CPTII,S$GLB,, | Performed by: STUDENT IN AN ORGANIZED HEALTH CARE EDUCATION/TRAINING PROGRAM

## 2022-05-09 PROCEDURE — 99214 OFFICE O/P EST MOD 30 MIN: CPT | Mod: S$GLB,,, | Performed by: STUDENT IN AN ORGANIZED HEALTH CARE EDUCATION/TRAINING PROGRAM

## 2022-05-09 PROCEDURE — 1159F MED LIST DOCD IN RCRD: CPT | Mod: CPTII,S$GLB,, | Performed by: STUDENT IN AN ORGANIZED HEALTH CARE EDUCATION/TRAINING PROGRAM

## 2022-05-09 PROCEDURE — 1160F RVW MEDS BY RX/DR IN RCRD: CPT | Mod: CPTII,S$GLB,, | Performed by: STUDENT IN AN ORGANIZED HEALTH CARE EDUCATION/TRAINING PROGRAM

## 2022-05-09 PROCEDURE — 3075F SYST BP GE 130 - 139MM HG: CPT | Mod: CPTII,S$GLB,, | Performed by: STUDENT IN AN ORGANIZED HEALTH CARE EDUCATION/TRAINING PROGRAM

## 2022-05-09 PROCEDURE — 3044F HG A1C LEVEL LT 7.0%: CPT | Mod: CPTII,S$GLB,, | Performed by: STUDENT IN AN ORGANIZED HEALTH CARE EDUCATION/TRAINING PROGRAM

## 2022-05-09 PROCEDURE — 1160F PR REVIEW ALL MEDS BY PRESCRIBER/CLIN PHARMACIST DOCUMENTED: ICD-10-PCS | Mod: CPTII,S$GLB,, | Performed by: STUDENT IN AN ORGANIZED HEALTH CARE EDUCATION/TRAINING PROGRAM

## 2022-05-09 PROCEDURE — 3079F PR MOST RECENT DIASTOLIC BLOOD PRESSURE 80-89 MM HG: ICD-10-PCS | Mod: CPTII,S$GLB,, | Performed by: STUDENT IN AN ORGANIZED HEALTH CARE EDUCATION/TRAINING PROGRAM

## 2022-05-09 PROCEDURE — 3075F PR MOST RECENT SYSTOLIC BLOOD PRESS GE 130-139MM HG: ICD-10-PCS | Mod: CPTII,S$GLB,, | Performed by: STUDENT IN AN ORGANIZED HEALTH CARE EDUCATION/TRAINING PROGRAM

## 2022-05-09 PROCEDURE — 3044F PR MOST RECENT HEMOGLOBIN A1C LEVEL <7.0%: ICD-10-PCS | Mod: CPTII,S$GLB,, | Performed by: STUDENT IN AN ORGANIZED HEALTH CARE EDUCATION/TRAINING PROGRAM

## 2022-05-09 PROCEDURE — 3008F BODY MASS INDEX DOCD: CPT | Mod: CPTII,S$GLB,, | Performed by: STUDENT IN AN ORGANIZED HEALTH CARE EDUCATION/TRAINING PROGRAM

## 2022-05-09 PROCEDURE — 1159F PR MEDICATION LIST DOCUMENTED IN MEDICAL RECORD: ICD-10-PCS | Mod: CPTII,S$GLB,, | Performed by: STUDENT IN AN ORGANIZED HEALTH CARE EDUCATION/TRAINING PROGRAM

## 2022-05-09 PROCEDURE — 3008F PR BODY MASS INDEX (BMI) DOCUMENTED: ICD-10-PCS | Mod: CPTII,S$GLB,, | Performed by: STUDENT IN AN ORGANIZED HEALTH CARE EDUCATION/TRAINING PROGRAM

## 2022-05-09 RX ORDER — LOSARTAN POTASSIUM AND HYDROCHLOROTHIAZIDE 12.5; 1 MG/1; MG/1
1 TABLET ORAL DAILY
Qty: 90 TABLET | Refills: 3 | Status: SHIPPED | OUTPATIENT
Start: 2022-05-09 | End: 2023-03-07 | Stop reason: SDUPTHER

## 2022-05-09 RX ORDER — INDOMETHACIN 50 MG/1
50 CAPSULE ORAL
Qty: 90 CAPSULE | Refills: 3 | Status: SHIPPED | OUTPATIENT
Start: 2022-05-09 | End: 2022-08-04 | Stop reason: SDUPTHER

## 2022-05-09 RX ORDER — FLUOXETINE 10 MG/1
10 CAPSULE ORAL DAILY
Qty: 30 CAPSULE | Refills: 11 | Status: SHIPPED | OUTPATIENT
Start: 2022-05-09 | End: 2022-09-19 | Stop reason: ALTCHOICE

## 2022-05-09 NOTE — PROGRESS NOTES
INTERNAL MEDICINE ESTABLISHED PATIENT VISIT NOTE        Assessment/Plan     1. Acute pain of right knee  Possible ligamental strain vs sprain vs partial tear. Advised supervised exercise program and consideration of f/u with ortho and MRI if no improvement.   - Ambulatory referral/consult to Physical/Occupational Therapy; Future    2. Essential hypertension  - losartan-hydrochlorothiazide 100-12.5 mg (HYZAAR) 100-12.5 mg Tab; Take 1 tablet by mouth once daily.  Dispense: 90 tablet; Refill: 3    3. 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  Dispense: 90 capsule; Refill: 3    4. Depression, unspecified depression type  SE to wellbutrin (sleep disturbances). Will give weight neutral substitute  - FLUoxetine 10 MG capsule; Take 1 capsule (10 mg total) by mouth once daily.  Dispense: 30 capsule; Refill: 11     5. Prediabetes  - semaglutide (OZEMPIC) 1 mg/dose (2 mg/1.5 mL) PnIj; Inject 1 mg into the skin every 7 days.  Dispense: 2 pen; Refill: 11    6. Obesity (BMI 30.0-34.9)  - semaglutide (OZEMPIC) 1 mg/dose (2 mg/1.5 mL) PnIj; Inject 1 mg into the skin every 7 days.  Dispense: 2 pen; Refill: 11      Health Maintenance reviewed and discussed with patient.   RTC in 2 mo, sooner if needed.    Subjective:     Chief Complaint: Knee Pain       Patient ID: Jose Fox is a 47 y.o. male with gout, depression, HTN, subclinical hyperthyroidism, prediabetes, who is here to discuss R knee pain.       Knee Pain   The incident occurred more than 1 week ago. The incident occurred at the gym. The injury mechanism was a twisting injury. The pain is present in the right knee. The quality of the pain is described as stabbing. The pain is moderate. The pain has been fluctuating since onset. Associated symptoms include a loss of motion. Pertinent negatives include no inability to  "bear weight, numbness or tingling. He reports no foreign bodies present. The symptoms are aggravated by weight bearing, movement and palpation. He has tried NSAIDs, ice and rest for the symptoms. The treatment provided mild relief.   --indomethacin somewhat helpful   --twisting mechanism while at the gym, strained his left low back, had been leaning a lot on the R side of the body, now with R knee and shoulder pain  --seen by provider here and given MSK relaxer with minimal effect  --seen by  provider and given toradol shot and xrays of knee and shoulder with soft tissue swelling   --has PT for his low back next week, will add on PT for the knee      Depression - still feeling depressed. On wellbutrin 150 mg XL but finds some trouble sleeping at night. Feels good at work, but feels more depressed when at work. Will switch to prozac for weight neutral benefits.     Past medical history, social history, family history, medications and allergies reviewed.      Review of Systems   Constitutional: Negative for activity change and unexpected weight change.   HENT: Negative for hearing loss, rhinorrhea and trouble swallowing.    Eyes: Negative for discharge and visual disturbance.   Respiratory: Negative for chest tightness and wheezing.    Cardiovascular: Negative for chest pain and palpitations.   Gastrointestinal: Negative for blood in stool, constipation, diarrhea and vomiting.   Endocrine: Negative for polydipsia and polyuria.   Genitourinary: Negative for difficulty urinating, hematuria and urgency.   Musculoskeletal: Negative for arthralgias, joint swelling and neck pain.   Neurological: Negative for tingling, weakness, numbness and headaches.   Psychiatric/Behavioral: Negative for confusion and dysphoric mood.           Objective:   /88   Pulse 78   Ht 6' 1" (1.854 m)   Wt 112.4 kg (247 lb 12.8 oz)   SpO2 99%   BMI 32.69 kg/m²        General: AAO x3, no apparent distress  HEENT: PERRL, OP clear  Neck: " Supple   CV: RRR, no m/r/g  Pulm: Lungs CTAB, no crackles, no wheezes  Abd: s/NT/ND +BS  Extremities: R knee with diffuse soft tissue swelling, Christoph's test positive with catching on rotation, left side knee pain/tenderness  Skin: no rashes, lesions, or tears          Alee Faulkner MD   [facility]  Department of Internal Medicine   05/09/2022

## 2022-05-09 NOTE — PATIENT INSTRUCTIONS
-referral to PT for your knee was made   --let me know if still having immense pain in the next few weeks and if PT ineffective, we can get an MRI and refer to orthopedics   -try out prozac for depression. F/u in 6-8 weeks   --I increased ozempic to the next highest dose

## 2022-05-17 ENCOUNTER — CLINICAL SUPPORT (OUTPATIENT)
Dept: REHABILITATION | Facility: HOSPITAL | Age: 47
End: 2022-05-17
Attending: INTERNAL MEDICINE
Payer: COMMERCIAL

## 2022-05-17 DIAGNOSIS — M54.42 LOW BACK PAIN WITH LEFT-SIDED SCIATICA, UNSPECIFIED BACK PAIN LATERALITY, UNSPECIFIED CHRONICITY: Primary | ICD-10-CM

## 2022-05-17 PROBLEM — M54.41 ACUTE LEFT-SIDED LOW BACK PAIN WITH BILATERAL SCIATICA: Status: ACTIVE | Noted: 2022-05-17

## 2022-05-17 PROCEDURE — 97110 THERAPEUTIC EXERCISES: CPT | Mod: PO

## 2022-05-17 PROCEDURE — 97161 PT EVAL LOW COMPLEX 20 MIN: CPT | Mod: PO

## 2022-05-17 NOTE — PLAN OF CARE
[facility] OUTPATIENT THERAPY AND [facility]LLNESS   Physical Therapy Initial Evaluation     Date: 5/17/2022   Name: Jose Fox  Clinic Number: [id number]    Therapy Diagnosis:   Encounter Diagnosis   Name Primary?    Low back pain with left-sided sciatica, unspecified back pain laterality, unspecified chronicity Yes     Physician: Olivia Campoverde*    Physician Orders: PT Eval and Treat  Medical Diagnosis from Referral: M54.42 (ICD-10-CM) - Lumbago with sciatica, left side  Evaluation Date: 5/17/2022  Authorization Period Expiration: 05/13/2023  Plan of Care Expiration: 8/17/2022  Progress Note Due: 6/17/2022  Visit # / Visits authorized: 1/ 1   FOTO: 1/ 3     Precautions: Standard    Time In: 830  Time Out: 915  Total Appointment Time (timed & untimed codes): 45 minutes      SUBJECTIVE   Date of onset: 4/23/2022    History of current condition - [facility] reports: Pt states that he was benching free weights with dumbbells and when he pushed up to finish his set, he felt a pull in his back. Pt states that he had to arch his back due to the bench's height. Pt reports mild pain in his buttocks when sitting.     Falls: None noted    Imaging: See chart    Prior Therapy: None noted  Social History: Lives in a apartment with 5 steps to enter lives alone  Occupation:   Prior Level of Function: No limitations  Current Level of Function: Lifting activities    Pain:  Current 1/10, worst 9/10, best 1/10   Location: bilateral back      Description: Throbbing  Aggravating Factors: Lifting and Twisting  Easing Factors: rest and stretching    Patients goals: Be able to twist and work without having next day stiffness     Medical History:   Past Medical History:   Diagnosis Date    Gout     Hypertension     Hypothyroidism     Thyroid disease        Surgical History:   Jose Fox  has a past surgical history that includes Leg Surgery (Left) and Cardiac electrophysiology study and ablation (2010).    Medications:    Jose has a current medication list which includes the following prescription(s): diclofenac sodium, febuxostat, fluoxetine, indomethacin, losartan-hydrochlorothiazide 100-12.5 mg, and semaglutide.    Allergies:   Review of patient's allergies indicates:  No Known Allergies       OBJECTIVE     Observation: Pt presents to clinic I with no AD    Posture:  Flatback lumbar posture, FHP    Lumbar Range of Motion:    % Limitation Pain Goal   Flexion 25   N      Full and pain free   Extension 10   N      Full and pain free   Left Side Bending 10 N      Full and pain free   Right Side Bending 10 N      Full and pain free   Left rotation   10 N      Full and pain free   Right Rotation   10 P      Full and pain free      Hip Range of Motion:   Right  Left Goal   Hip Abduction 40 40 40 deg.   Hip Extension 30 30 30 deg.   Hip External Rotation (hip flexed to 90) 35 35 45 deg.   Hip Internal Rotation (hip flexed to 90) 35 35 45 deg.   Hip Flexion 120 120 120 deg.       Lower Extremity Strength  Right LE  Left LE  Goal   Knee extension: 5/5 Knee extension: 5/5 5/5   Knee flexion: 5/5 Knee flexion: 5/5 5/5   Hip flexion: 5/5 Hip flexion: 5/5 5/5   Hip extension:  5/5 Hip extension: 5/5 5/5   Hip abduction: 5/5 Hip abduction: 5/5 5/5   Hip adduction: 5/5 Hip adduction 5/5 5/5   Ankle dorsiflexion: 5/5 Ankle dorsiflexion: 5/5 5/5   Ankle plantarflexion: 5/5 Ankle plantarflexion: 5/5 5/5       Repeated Testing:  Repeated Flexion in Standing end range pain   Repeated Extension in Standing better   Repeated Flexion in lying end range pain   Repeated Extension in lying  no worse     Movement Quality  Bodyweight Squat Restrictions in soleus and hamstrings     Special Tests:   Right Left   EILDIA Negative Negative   FADIR Negative Negative   SCOUR Negative Negative   Lumbar Instability Test Positive Positive     SI Joint Cluster  Sacral Thrust Negative   Sacral Distraction Negative   Gaenslen's Negative   Thigh Thrust Negative   Sacral  "Compression Negative       DTR:   Right Left Comment   Patellar (L3-4) 2+ 2+    Achilles (S1) 2+ 2+        Neuro Dynamic Testing:    Sciatic nerve:      SLR: R = Negative     L = Negative       Femoral Nerve:    Femoral nerve test: Negative      Joint Mobility: Hypomobility noted throughout lumbar spine    Palpation: TTP at L4-5 and L5-S1, TTP at R QL    Sensation: Intact    Flexibility:  Walt test: R = Positive ; L= Positive      Limitation/Restriction for FOTO Lumbar Survey    Therapist reviewed FOTO scores for Jose Fox on 5/17/2022.   FOTO documents entered into [employer] - see Media section.    Limitation Score: 42%         TREATMENT     Total Treatment time (time-based codes) separate from Evaluation: 15 minutes     THERAPEUTIC EXERCISES to develop  strength, endurance, core stabilization, and ROM for 15 minutes including.    Intervention    Performed Today Sets/Reps/Resistance     Lower Trunk Rotation x x15   Hip Flexor Stretch - Half-kneeling x x2 with 30" hold   Supine Transversus Abdominis (TA) x x30     Plan for Next Visit: Continue core strengthening and mobility exercises       PATIENT EDUCATION AND HOME EXERCISES   Education provided:   Role of Physical Therapist  Physical Therapy Plan Of Care  HEP      Written Home Exercises Provided: yes.  Exercises were reviewed and Sylvester was able to demonstrate them prior to the end of the session.  Sylvester demonstrated good  understanding of the education provided.     See EMR under Patient Instructions for exercises provided 5/17/2022    ASSESSMENT     Jose is a 47 y.o. male referred to outpatient Physical Therapy with a medical diagnosis of M54.42 (ICD-10-CM) - Lumbago with sciatica, left side. Patient presents with signs and symptoms consistent with a physical therapy diagnosis of lumbar instability with radicular symptoms secondary to decreased core activation during lifting activities including the above listed objective test and measures. During today's " session patient demonstrated muscle length deficits, decreased core activation, and TTP at L4 and L5 spinal levels.    Patient prognosis is Good.   Patientt will benefit from skilled outpatient Physical Therapy to address the deficits stated above and in the chart below, provide patient /family education, and to maximize patientt's level of independence.     Plan of care discussed with patient: Yes  Patient's spiritual, cultural and educational needs considered and patient is agreeable to the plan of care and goals as stated below:     Anticipated Barriers for therapy: scheduling    Medical Necessity is demonstrated by the following  History  Co-morbidities and personal factors that may impact the plan of care Co-morbidities:   depression, high BMI and HTN    Personal Factors:   no deficits     moderate   Examination  Body Structures and Functions, activity limitations and participation restrictions that may impact the plan of care Body Regions:   back    Body Systems:    gross symmetry  ROM  strength    Participation Restrictions:   None    Activity limitations:   Learning and applying knowledge  no deficits    General Tasks and Commands  no deficits    Communication  no deficits    Mobility  no deficits    Self care  no deficits    Domestic Life  no deficits    Interactions/Relationships  no deficits    Life Areas  no deficits    Community and Social Life  no deficits         moderate   Clinical Presentation stable and uncomplicated low   Decision Making/ Complexity Score: low     Goals:    SHORT TERM GOALS:  4 weeks 5/17/2022   1. Recent signs and systems trend is improving in order to progress towards LTG's.    2. Patient will be independent with HEP in order to further progress and return to maximal function.    3. Pain rating at Worst: 5/10 in order to progress towards increased independence with activity.    4. Patient will be able to correct postural deviations in sitting and standing, to decrease pain and  promote postural awareness for injury prevention.       LONG TERM GOALS: 8 weeks 5/17/2022   1. Patient will return to normal ADL, recreational, and work related activities with less pain and limitation.     2. Patient will improve AROM to stated goals in order to return to maximal functional potential.     3. Patient will improve Strength to stated goals of appropriate musculature in order to improve functional independence.     4. Pain Rating at Best: 1/10 to improve Quality of Life.     5. Patient will meet predicted functional outcome (FOTO) score: 25% to increase self-worth & perceived functional ability.    6. 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        PLAN   Plan of care Certification: 5/17/2022 to 8/17/2022.    Outpatient Physical Therapy 2 times weekly for 8 weeks to include the following interventions: Manual Therapy, Therapeutic Activities, Therapeutic Exercise and Dry Needling.     Mohan Perla, PT, DPT      I CERTIFY THE NEED FOR THESE SERVICES FURNISHED UNDER THIS PLAN OF TREATMENT AND WHILE UNDER MY CARE   Physician's comments:     Physician's Signature: ___________________________________________________

## 2022-05-26 ENCOUNTER — CLINICAL SUPPORT (OUTPATIENT)
Dept: REHABILITATION | Facility: HOSPITAL | Age: 47
End: 2022-05-26
Attending: INTERNAL MEDICINE
Payer: COMMERCIAL

## 2022-05-26 DIAGNOSIS — M54.42 LOW BACK PAIN WITH LEFT-SIDED SCIATICA, UNSPECIFIED BACK PAIN LATERALITY, UNSPECIFIED CHRONICITY: Primary | ICD-10-CM

## 2022-05-26 PROCEDURE — 97110 THERAPEUTIC EXERCISES: CPT | Mod: PO,CQ

## 2022-05-26 NOTE — PROGRESS NOTES
"[facility] OUTPATIENT THERAPY AND WELLNESS   Physical Therapy Treatment Note     Name: Jose Fox  [facility] Number: [id number]    Therapy Diagnosis:   Encounter Diagnosis   Name Primary?    Low back pain with left-sided sciatica, unspecified back pain laterality, unspecified chronicity Yes     Physician: Alee Faulkner MD    Visit Date: 5/26/2022    Physician Orders: PT Eval and Treat  Medical Diagnosis from Referral: M54.42 (ICD-10-CM) - Lumbago with sciatica, left side  Evaluation Date: 5/17/2022  Authorization Period Expiration: 12/31/2022  Plan of Care Expiration: 8/17/2022  Progress Note Due: 6/17/2022  Visit # / Visits authorized: 1/ 20  FOTO: 1/ 3      Precautions: Standard      PTA Visit #: 1/5     Time In: 9:55 am  Time Out: 10:38 am  Total Billable Time: 43 minutes    SUBJECTIVE     Pt reports: he isn't having much pain, but he has tightness in his back  He was compliant with home exercise program.  Response to previous treatment: tolerated well  Functional change: ongoing    Pain: 2/10  Location: bilateral low back    OBJECTIVE     Objective Measures updated at progress report unless specified.     Treatment     Sylvester received the treatments listed below:      therapeutic exercises to develop strength, endurance, flexibility, posture and core stabilization for 43 minutes including:  Nu step 5 min  Lower Trunk Rotation 15x5"  Supine Transversus Abdominis (TA) x30x5"  Supine glute sets with LE extended 30x5"  Bridges 2x10  Prone lying 3 minutes  Supine hip flexor stretch 3x30"  Standing lumbar extension x10 reps  Standing alternating hip         Patient Education and Home Exercises     Home Exercises Provided and Patient Education Provided     Education provided:   - HEP  - recommended using a standing desk due to improved pain levels with standing vs prolonged sitting    Written Home Exercises Provided: Patient instructed to cont prior HEP. Exercises were reviewed and Sylvester was able to demonstrate them prior " "to the end of the session.  Sylvester demonstrated good  understanding of the education provided. See EMR under Patient Instructions for exercises provided during therapy sessions    ASSESSMENT     Sylvester presents to treatment with reports of "tightness" in low back. He reports improved symptoms with standing extension and increased pain with prolonged sitting. He reported no improvement in pain with prone lying with some radicular symptoms down his right leg. Favorable response to standing repeated extension with less pain reported following  He was limited by additional pain in his right knee from a recent injury. Hip flexor stretch was performed in supine vs half kneeling due to increased knee pain in half kneeling position. Continue to progress patient with extension exercises as tolerated.     Sylvester Is progressing well towards his goals.   Pt prognosis is Good.     Pt will continue to benefit from skilled outpatient physical therapy to address the deficits listed in the problem list box on initial evaluation, provide pt/family education and to maximize pt's level of independence in the home and community environment.     Pt's spiritual, cultural and educational needs considered and pt agreeable to plan of care and goals.     Anticipated Barriers for therapy: scheduling       Goals:     SHORT TERM GOALS:  4 weeks 5/17/2022   1. Recent signs and systems trend is improving in order to progress towards LTG's.     2. Patient will be independent with HEP in order to further progress and return to maximal function.     3. Pain rating at Worst: 5/10 in order to progress towards increased independence with activity.     4. Patient will be able to correct postural deviations in sitting and standing, to decrease pain and promote postural awareness for injury prevention.         LONG TERM GOALS: 8 weeks 5/17/2022   1. Patient will return to normal ADL, recreational, and work related activities with less pain and limitation.    "   2. Patient will improve AROM to stated goals in order to return to maximal functional potential.      3. Patient will improve Strength to stated goals of appropriate musculature in order to improve functional independence.      4. Pain Rating at Best: 1/10 to improve Quality of Life.      5. Patient will meet predicted functional outcome (FOTO) score: 25% to increase self-worth & perceived functional ability.     6. 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             PLAN     Progress core stability and extension exercises as tolerated.     Merlyn Reilly, PTA

## 2022-06-08 ENCOUNTER — CLINICAL SUPPORT (OUTPATIENT)
Dept: REHABILITATION | Facility: HOSPITAL | Age: 47
End: 2022-06-08
Attending: INTERNAL MEDICINE
Payer: COMMERCIAL

## 2022-06-08 DIAGNOSIS — M54.50 ACUTE LEFT-SIDED LOW BACK PAIN WITHOUT SCIATICA: ICD-10-CM

## 2022-06-08 DIAGNOSIS — M25.60 STIFFNESS OF VERTEBRAL COLUMN: Primary | ICD-10-CM

## 2022-06-08 PROCEDURE — 97110 THERAPEUTIC EXERCISES: CPT | Mod: PO

## 2022-06-08 NOTE — PROGRESS NOTES
"[facility]   Physical Therapy Treatment Note     Name: Jose Fox  Clinic Number: [id number]    Therapy Diagnosis:   Encounter Diagnoses   Name Primary?    Stiffness of vertebral column Yes    Acute left-sided low back pain without sciatica      Physician: Alee Faulkner MD    Visit Date: 6/8/2022    Physician Orders: PT Eval and Treat  Medical Diagnosis from Referral: M54.42 (ICD-10-CM) - Lumbago with sciatica, left side  Evaluation Date: 5/17/2022  Authorization Period Expiration: 12/31/2022  Plan of Care Expiration: 8/17/2022  Progress Note Due: 6/17/2022  Visit # / Visits authorized: 2/ 20  FOTO: 1/ 3      Precautions: Standard      PTA Visit #: 1/5     Time In: 8:00 am  Time Out: 8:45 am  Total Billable Time: 45 minutes    SUBJECTIVE     Pt reports: Mainly stiffness today.  He was compliant with home exercise program.  Response to previous treatment: tolerated well  Functional change: ongoing    Pain: 4/10  Location: bilateral low back    OBJECTIVE     Objective Measures updated at progress report unless specified.     Thoracic rotation: 50% limited     Treatment     Sylvester received the treatments listed below:      therapeutic exercises to develop strength, endurance, flexibility, posture and core stabilization for 45 minutes including:  Upright Bike 8 minutes  Open books 15x 5" holds each side  Thoracic extensions over chair 15x 5" holds  Lower Trunk Rotation 15x5"  Dead bug heel taps 2x10  Hip thrusts 2x12 35#  Prone lying 2 minutes  Shuttle presses 8 cords 2'  Supine hip flexor stretch 3x30"  Standing lumbar extension x10 reps        Patient Education and Home Exercises     Home Exercises Provided and Patient Education Provided     Education provided:   - HEP  - recommended using a standing desk due to improved pain levels with standing vs prolonged sitting    Written Home Exercises Provided: Patient instructed to cont prior HEP. Exercises were reviewed and Sylvester was able to " demonstrate them prior to the end of the session.  Sylvester demonstrated good  understanding of the education provided. See EMR under Patient Instructions for exercises provided during therapy sessions    ASSESSMENT     Sylvester presents today with increased back stiffness. Upon evaluation of his thoracic spine, PT found limitations in both thoracic rotation and extension. Appropriate therex implemented to address mobility deficits. Pt tolerated all progressions well with no increase in discomfort. Pt demonstrated decreased thoracolumbar restriction after treatment, indicating good response. Continue to progress hip and thoracic mobility as well as lumbar core stabilization.    Sylvester Is progressing well towards his goals.   Pt prognosis is Good.     Pt will continue to benefit from skilled outpatient physical therapy to address the deficits listed in the problem list box on initial evaluation, provide pt/family education and to maximize pt's level of independence in the home and community environment.     Pt's spiritual, cultural and educational needs considered and pt agreeable to plan of care and goals.     Anticipated Barriers for therapy: scheduling       Goals:     SHORT TERM GOALS:  4 weeks 5/17/2022   1. Recent signs and systems trend is improving in order to progress towards LTG's.     2. Patient will be independent with HEP in order to further progress and return to maximal function.     3. Pain rating at Worst: 5/10 in order to progress towards increased independence with activity.     4. Patient will be able to correct postural deviations in sitting and standing, to decrease pain and promote postural awareness for injury prevention.         LONG TERM GOALS: 8 weeks 5/17/2022   1. Patient will return to normal ADL, recreational, and work related activities with less pain and limitation.      2. Patient will improve AROM to stated goals in order to return to maximal functional potential.      3. Patient will improve  Strength to stated goals of appropriate musculature in order to improve functional independence.      4. Pain Rating at Best: 1/10 to improve Quality of Life.      5. Patient will meet predicted functional outcome (FOTO) score: 25% to increase self-worth & perceived functional ability.     6. 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             PLAN     Progress core stability and extension exercises as tolerated. Continue with thoracic and hip mobility.    Mohan Perla, PT

## 2022-06-14 DIAGNOSIS — E66.9 OBESITY (BMI 30.0-34.9): ICD-10-CM

## 2022-06-14 DIAGNOSIS — R73.03 PREDIABETES: ICD-10-CM

## 2022-06-14 NOTE — TELEPHONE ENCOUNTER
Refill Routing Note   Medication(s) are not appropriate for processing by [facility] for the following reason(s):      - Indication is outside of scope for [facility]    [facility] action(s):  Route          Medication reconciliation completed: No     Appointments  past 12m or future 3m with PCP    Date Provider   Last Visit   5/9/2022 Alee Faulkner MD   Next Visit   Visit date not found Alee Faulkner MD   ED visits in past 90 days: 0        Note composed:5:39 PM 06/14/2022

## 2022-06-14 NOTE — TELEPHONE ENCOUNTER
No new care gaps identified.  [facility] Embedded Care Gaps. Reference number: [id number]. 6/14/2022   3:26:37 PM CDT

## 2022-06-22 ENCOUNTER — CLINICAL SUPPORT (OUTPATIENT)
Dept: REHABILITATION | Facility: HOSPITAL | Age: 47
End: 2022-06-22
Attending: INTERNAL MEDICINE
Payer: COMMERCIAL

## 2022-06-22 DIAGNOSIS — M25.60 STIFFNESS OF VERTEBRAL COLUMN: Primary | ICD-10-CM

## 2022-06-22 PROCEDURE — 97110 THERAPEUTIC EXERCISES: CPT | Mod: PO

## 2022-06-22 PROCEDURE — 97140 MANUAL THERAPY 1/> REGIONS: CPT | Mod: PO
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
## 2022-06-29 ENCOUNTER — CLINICAL SUPPORT (OUTPATIENT)
Dept: REHABILITATION | Facility: HOSPITAL | Age: 47
End: 2022-06-29
Attending: STUDENT IN AN ORGANIZED HEALTH CARE EDUCATION/TRAINING PROGRAM
Payer: COMMERCIAL

## 2022-06-29 DIAGNOSIS — M25.60 STIFFNESS OF VERTEBRAL COLUMN: Primary | ICD-10-CM

## 2022-06-29 PROCEDURE — 97140 MANUAL THERAPY 1/> REGIONS: CPT | Mod: PO

## 2022-06-29 PROCEDURE — 97110 THERAPEUTIC EXERCISES: CPT | Mod: PO

## 2022-06-29 NOTE — PROGRESS NOTES
[facility]   Physical Therapy Treatment Note     Name: Jose Fox  [facility] Number: [id number]    Therapy Diagnosis:   Encounter Diagnosis   Name Primary?    Stiffness of vertebral column Yes     Physician: Alee Faulkner MD    Visit Date: 6/29/2022    Physician Orders: PT Eval and Treat  Medical Diagnosis from Referral: M54.42 (ICD-10-CM) - Lumbago with sciatica, left side  Evaluation Date: 5/17/2022  Authorization Period Expiration: 12/31/2022  Plan of Care Expiration: 8/17/2022  Progress Note Due: 6/17/2022  Visit # / Visits authorized: 2/ 20  FOTO: 1/ 3      Precautions: Standard      PTA Visit #: 1/5     Time In: 8:01 am  Time Out: 8:47 am  Total Billable Time: 46 minutes    SUBJECTIVE     Pt reports: Pt presents with continued stiffness.  He was compliant with home exercise program.  Response to previous treatment: tolerated well  Functional change: ongoing    Pain: 2/10  Location: bilateral low back    OBJECTIVE     Lumbar Range of Motion:     % Limitation Pain Goal   Flexion 25    N       Full and pain free   Extension 10    N       Full and pain free   Left Side Bending 10 N       Full and pain free   Right Side Bending 10 N       Full and pain free   Left rotation    10 N       Full and pain free   Right Rotation    10 P       Full and pain free      Hip Range of Motion:    Right  Left Goal   Hip Abduction 40 40 40 deg.   Hip Extension 30 30 30 deg.   Hip External Rotation (hip flexed to 90) 35 35 45 deg.   Hip Internal Rotation (hip flexed to 90) 35 35 45 deg.   Hip Flexion 120 120 120 deg.     Thoracic rotation 50%    Treatment     Sylvester received the treatments listed below:      therapeutic exercises to develop strength, endurance, flexibility, posture and core stabilization for 8 minutes including:    Upright Bike 8 minutes      Not performed:  Dead bug heel taps 2x10  Hip thrusts 2x12 35#  Prone lying 2 minutes  Shuttle presses 8 cords 2'  Standing lumbar extension x10  "reps  Open books 15x 5" holds each side  Lower Trunk Rotation 15x5"  Cat cow x6 with 5" holds  Supine hip flexor stretch 3x30"  Ball rollouts 5" holds in each position x10  Thoracic extensions over chair 15x 5" holds    Manual Therapy for 38 minutes including:    Informed consent obtained after thorough explanation of risks and benefits. Signed consent form will be scanned into medical record. Pre-procedure time out performed which included verification of name, , and site of treatment. 70% isopropyl alcohol wipe down performed to treatment site with single use sterile prep pads.    TDN with:    4 - 4 J Type Needles in bilateral paraspinals   4 - 4 J Type Needles in multifidi    Treatment performed in prone position.    If performing ES:  -ES with TDN x 30 minutes at continuous wave frequency  -Treatment performed in prone position with intensity set to patient tolerance and safety control switch given to patient.    Not Performed:        Patient Education and Home Exercises     Home Exercises Provided and Patient Education Provided     Education provided:   - HEP  - recommended using a standing desk due to improved pain levels with standing vs prolonged sitting    Written Home Exercises Provided: Patient instructed to cont prior HEP. Exercises were reviewed and Sylvester was able to demonstrate them prior to the end of the session.  Sylvester demonstrated good  understanding of the education provided. See EMR under Patient Instructions for exercises provided during therapy sessions    ASSESSMENT     TDN performed to reduce pain and muscle tension, promote blood flow, and improve ROM and function. Pt tolerated tx well without adverse effects. Sylvester was educated on what to expect following the procedure and verbalized understanding.    Sylvester Is progressing well towards his goals.   Pt prognosis is Good.     Pt will continue to benefit from skilled outpatient physical therapy to address the deficits listed in the problem list box " on initial evaluation, provide pt/family education and to maximize pt's level of independence in the home and community environment.     Pt's spiritual, cultural and educational needs considered and pt agreeable to plan of care and goals.     Anticipated Barriers for therapy: scheduling       Goals:     SHORT TERM GOALS:  4 weeks 5/17/2022   1. Recent signs and systems trend is improving in order to progress towards LTG's.     2. Patient will be independent with HEP in order to further progress and return to maximal function.     3. Pain rating at Worst: 5/10 in order to progress towards increased independence with activity.     4. Patient will be able to correct postural deviations in sitting and standing, to decrease pain and promote postural awareness for injury prevention.         LONG TERM GOALS: 8 weeks 5/17/2022   1. Patient will return to normal ADL, recreational, and work related activities with less pain and limitation.      2. Patient will improve AROM to stated goals in order to return to maximal functional potential.      3. Patient will improve Strength to stated goals of appropriate musculature in order to improve functional independence.      4. Pain Rating at Best: 1/10 to improve Quality of Life.      5. Patient will meet predicted functional outcome (FOTO) score: 25% to increase self-worth & perceived functional ability.     6. 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             PLAN     Progress core stability and extension exercises as tolerated. Continue with thoracic and hip mobility.    Mohan Perla, PT

## 2022-07-06 ENCOUNTER — CLINICAL SUPPORT (OUTPATIENT)
Dept: REHABILITATION | Facility: HOSPITAL | Age: 47
End: 2022-07-06
Attending: INTERNAL MEDICINE
Payer: COMMERCIAL

## 2022-07-06 DIAGNOSIS — M25.60 STIFFNESS OF VERTEBRAL COLUMN: Primary | ICD-10-CM

## 2022-07-06 PROCEDURE — 97530 THERAPEUTIC ACTIVITIES: CPT | Mod: PO

## 2022-07-06 PROCEDURE — 97110 THERAPEUTIC EXERCISES: CPT | Mod: PO

## 2022-07-06 PROCEDURE — 97140 MANUAL THERAPY 1/> REGIONS: CPT | Mod: PO

## 2022-07-06 NOTE — PROGRESS NOTES
[facility]   Physical Therapy Treatment Note/Progress Note    Name: Jose Fox  Clinic Number: [id number]    Therapy Diagnosis:   Encounter Diagnosis   Name Primary?    Stiffness of vertebral column Yes     Physician: Alee Faulkner MD    Visit Date: 7/6/2022    Physician Orders: PT Eval and Treat  Medical Diagnosis from Referral: M54.42 (ICD-10-CM) - Lumbago with sciatica, left side  Evaluation Date: 5/17/2022  Authorization Period Expiration: 12/31/2022  Plan of Care Expiration: 8/17/2022  Progress Note Due: 8/6/2022  Visit # / Visits authorized: 5/ 20  FOTO: 1/ 3   Scan QR code below to start FOTO new survey      Precautions: Standard      PTA Visit #: 1/5     Time In: 8:00 am  Time Out: 8:46 am  Total Billable Time: 46 minutes    SUBJECTIVE     Pt reports: Pt presents with decreased stiffness and no back pain today. Pt felt relief in the days after dry needling.  He was compliant with home exercise program.  Response to previous treatment: tolerated well  Functional change: ongoing    Pain: 0/10  Location: bilateral low back    OBJECTIVE     Lumbar Range of Motion:     % Limitation Pain Goal   Flexion 0    N       Full and pain free   Extension 0    N       Full and pain free   Left Side Bending 0 N       Full and pain free   Right Side Bending 0 N       Full and pain free   Left rotation    0 N       Full and pain free   Right Rotation    0 N       Full and pain free      Hip Range of Motion:    Right  Left Goal   Hip Abduction 40 40 40 deg.   Hip Extension 30 30 30 deg.   Hip External Rotation (hip flexed to 90) 35 35 45 deg.   Hip Internal Rotation (hip flexed to 90) 35 35 45 deg.   Hip Flexion 120 120 120 deg.     FOTO Score: 12% limitation    Treatment     Sylvester received the treatments listed below:      therapeutic exercises to develop strength, endurance, flexibility, posture and core stabilization for 23 minutes including:    Upright Bike 8 minutes  Dead bugs LE only  "2x8  Bridges 3x10  Leg lifts over 5# kettlebells in long sitting 2x20 each leg    Not performed:  Shuttle presses 8 cords 2'  Open books 15x 5" holds each side  Lower Trunk Rotation 15x5"  Cat cow x6 with 5" holds  Supine hip flexor stretch 3x30"  Ball rollouts 5" holds in each position x10  Thoracic extensions over chair 15x 5" holds    therapeutic activities to develop strength, endurance, flexibility, posture and core stabilization for 13 minutes including:  Functional Deadlifts 45# 3x10  Sled pull/push 115# 3 laps    Manual Therapy for 10 minutes including:  Objective measures  STM to lumbar paraspinals    Not Performed:        Patient Education and Home Exercises     Home Exercises Provided and Patient Education Provided     Education provided:   - HEP  - recommended using a standing desk due to improved pain levels with standing vs prolonged sitting    Written Home Exercises Provided: Patient instructed to cont prior HEP. Exercises were reviewed and Sylvester was able to demonstrate them prior to the end of the session.  Sylvester demonstrated good  understanding of the education provided. See EMR under Patient Instructions for exercises provided during therapy sessions    ASSESSMENT     Pt tolerated session well today. Progress note today focused on objective measurements and core stabilization with functional movement. FOTO score indicates good progress to return to PLOF. Overall, pt has seen significant pain relief since dry needling and has returned to work with minimal discomfort. Pt has one more visit scheduled at the beginning of August, so PT will assess pt's condition then and will discharge if appropriate.    Sylvester Is progressing well towards his goals.   Pt prognosis is Good.     Pt will continue to benefit from skilled outpatient physical therapy to address the deficits listed in the problem list box on initial evaluation, provide pt/family education and to maximize pt's level of independence in the home and " community environment.     Pt's spiritual, cultural and educational needs considered and pt agreeable to plan of care and goals.     Anticipated Barriers for therapy: scheduling       Goals:     SHORT TERM GOALS:  4 weeks 5/17/2022   1. Recent signs and systems trend is improving in order to progress towards LTG's. MET  7/6/2022    2. Patient will be independent with HEP in order to further progress and return to maximal function. MET  7/6/2022    3. Pain rating at Worst: 5/10 in order to progress towards increased independence with activity.  MET  7/6/2022   4. Patient will be able to correct postural deviations in sitting and standing, to decrease pain and promote postural awareness for injury prevention.   MET  7/6/2022      LONG TERM GOALS: 8 weeks 5/17/2022   1. Patient will return to normal ADL, recreational, and work related activities with less pain and limitation.   MET  7/6/2022   2. Patient will improve AROM to stated goals in order to return to maximal functional potential.   MET  7/6/2022   3. Patient will improve Strength to stated goals of appropriate musculature in order to improve functional independence.   MET  7/6/2022   4. Pain Rating at Best: 1/10 to improve Quality of Life.   MET  7/6/2022   5. Patient will meet predicted functional outcome (FOTO) score: 25% to increase self-worth & perceived functional ability.  MET  7/6/2022   6. 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  MET  7/6/2022           PLAN     Discharge next visit if appropriate.    Mohan Perla, PT, DPT

## 2022-09-04 ENCOUNTER — OFFICE VISIT (OUTPATIENT)
Dept: URGENT CARE | Facility: CLINIC | Age: 47
End: 2022-09-04
Payer: COMMERCIAL

## 2022-09-04 VITALS
WEIGHT: 247 LBS | SYSTOLIC BLOOD PRESSURE: 121 MMHG | HEART RATE: 81 BPM | RESPIRATION RATE: 20 BRPM | HEIGHT: 73 IN | BODY MASS INDEX: 32.74 KG/M2 | DIASTOLIC BLOOD PRESSURE: 83 MMHG | OXYGEN SATURATION: 96 % | TEMPERATURE: 98 F

## 2022-09-04 DIAGNOSIS — M79.10 MUSCLE TENDERNESS: ICD-10-CM

## 2022-09-04 DIAGNOSIS — R51.9 NONINTRACTABLE HEADACHE, UNSPECIFIED CHRONICITY PATTERN, UNSPECIFIED HEADACHE TYPE: Primary | ICD-10-CM

## 2022-09-04 LAB
CTP QC/QA: YES
SARS-COV-2 RDRP RESP QL NAA+PROBE: NEGATIVE

## 2022-09-04 PROCEDURE — 96372 THER/PROPH/DIAG INJ SC/IM: CPT | Mod: S$GLB,,, | Performed by: NURSE PRACTITIONER

## 2022-09-04 PROCEDURE — 99214 OFFICE O/P EST MOD 30 MIN: CPT | Mod: 25,S$GLB,, | Performed by: NURSE PRACTITIONER

## 2022-09-04 PROCEDURE — 3074F SYST BP LT 130 MM HG: CPT | Mod: CPTII,S$GLB,, | Performed by: NURSE PRACTITIONER

## 2022-09-04 PROCEDURE — 96372 PR INJECTION,THERAP/PROPH/DIAG2ST, IM OR SUBCUT: ICD-10-PCS | Mod: S$GLB,,, | Performed by: NURSE PRACTITIONER

## 2022-09-04 PROCEDURE — 3074F PR MOST RECENT SYSTOLIC BLOOD PRESSURE < 130 MM HG: ICD-10-PCS | Mod: CPTII,S$GLB,, | Performed by: NURSE PRACTITIONER

## 2022-09-04 PROCEDURE — 3044F HG A1C LEVEL LT 7.0%: CPT | Mod: CPTII,S$GLB,, | Performed by: NURSE PRACTITIONER

## 2022-09-04 PROCEDURE — 3079F PR MOST RECENT DIASTOLIC BLOOD PRESSURE 80-89 MM HG: ICD-10-PCS | Mod: CPTII,S$GLB,, | Performed by: NURSE PRACTITIONER

## 2022-09-04 PROCEDURE — U0002: ICD-10-PCS | Mod: QW,S$GLB,, | Performed by: NURSE PRACTITIONER

## 2022-09-04 PROCEDURE — 1159F MED LIST DOCD IN RCRD: CPT | Mod: CPTII,S$GLB,, | Performed by: NURSE PRACTITIONER

## 2022-09-04 PROCEDURE — 99214 PR OFFICE/OUTPT VISIT, EST, LEVL IV, 30-39 MIN: ICD-10-PCS | Mod: 25,S$GLB,, | Performed by: NURSE PRACTITIONER

## 2022-09-04 PROCEDURE — 3008F PR BODY MASS INDEX (BMI) DOCUMENTED: ICD-10-PCS | Mod: CPTII,S$GLB,, | Performed by: NURSE PRACTITIONER

## 2022-09-04 PROCEDURE — 3079F DIAST BP 80-89 MM HG: CPT | Mod: CPTII,S$GLB,, | Performed by: NURSE PRACTITIONER

## 2022-09-04 PROCEDURE — U0002 COVID-19 LAB TEST NON-CDC: HCPCS | Mod: QW,S$GLB,, | Performed by: NURSE PRACTITIONER

## 2022-09-04 PROCEDURE — 1160F PR REVIEW ALL MEDS BY PRESCRIBER/CLIN PHARMACIST DOCUMENTED: ICD-10-PCS | Mod: CPTII,S$GLB,, | Performed by: NURSE PRACTITIONER

## 2022-09-04 PROCEDURE — 3008F BODY MASS INDEX DOCD: CPT | Mod: CPTII,S$GLB,, | Performed by: NURSE PRACTITIONER

## 2022-09-04 PROCEDURE — 1159F PR MEDICATION LIST DOCUMENTED IN MEDICAL RECORD: ICD-10-PCS | Mod: CPTII,S$GLB,, | Performed by: NURSE PRACTITIONER

## 2022-09-04 PROCEDURE — 1160F RVW MEDS BY RX/DR IN RCRD: CPT | Mod: CPTII,S$GLB,, | Performed by: NURSE PRACTITIONER

## 2022-09-04 PROCEDURE — 3044F PR MOST RECENT HEMOGLOBIN A1C LEVEL <7.0%: ICD-10-PCS | Mod: CPTII,S$GLB,, | Performed by: NURSE PRACTITIONER

## 2022-09-04 RX ORDER — KETOROLAC TROMETHAMINE 30 MG/ML
30 INJECTION, SOLUTION INTRAMUSCULAR; INTRAVENOUS
Status: COMPLETED | OUTPATIENT
Start: 2022-09-04 | End: 2022-09-04

## 2022-09-04 RX ORDER — BUPROPION HYDROCHLORIDE 150 MG/1
150 TABLET ORAL EVERY MORNING
COMMUNITY
Start: 2022-08-26 | End: 2022-09-19 | Stop reason: SDUPTHER

## 2022-09-04 RX ORDER — ALLOPURINOL 100 MG/1
100 TABLET ORAL DAILY
COMMUNITY
Start: 2022-08-26 | End: 2023-05-04 | Stop reason: ALTCHOICE

## 2022-09-04 RX ADMIN — KETOROLAC TROMETHAMINE 30 MG: 30 INJECTION, SOLUTION INTRAMUSCULAR; INTRAVENOUS at 06:09

## 2022-09-04 NOTE — PATIENT INSTRUCTIONS
Headache   If your condition worsens or fails to improve we recommend that you receive another evaluation at the ER immediately or contact your PCP to discuss your concerns or return here. You must understand that you've received an urgent care treatment only and that you may be released before all your medical problems are known or treated. You the patient will arrange for follouwp care as instructed.   If you were given narcotic prescriptions or muscle relaxants do not operate heavy equipment or machinery while taking these medications   Get rest and drink fluids. Get plenty of rest.  Watch for increase pain, fever, vomiting, neck pain or mental confusion.   You can also use tylenol or ibuprofen as directed as long as you don't have any allergies to these medications or medical conditions such as ulcers, liver or kidney disease or blood thinners etc that would prevent you from taking these meds.

## 2022-09-04 NOTE — PROGRESS NOTES
"Subjective:       Patient ID: Jose Fox is a 47 y.o. male.    Vitals:  height is 6' 1" (1.854 m) and weight is 112 kg (247 lb). His temperature is 98.3 °F (36.8 °C). His blood pressure is 121/83 and his pulse is 81. His respiration is 20 and oxygen saturation is 96%.     Chief Complaint: Headache    This is a 47 y.o. male   who presents today with a chief complaint of a headache that began three days ago. He describes the pain as throbbing. The pain radiates from the back of his head and toward the sides. He's been taking aleve to help relieve his symptoms.     Provider note:  47-year-old male presents to urgent care for evaluation of headache pain 3 days ago.  He describes the pain as throbbing.  States pain is mostly to the back of his head and neck.  Has been taking Aleve to relieve pain.  Patient states pain is somewhat relieved with Aleve.  Some improvement with massaging and heat application.  Denies  drooling, facial drooping or aphasia.    Headache   This is a new problem. The current episode started in the past 7 days. The problem occurs constantly. The problem has been waxing and waning. The pain is located in the Occipital and bilateral region. The pain radiates to the right neck and left neck. The quality of the pain is described as throbbing. The pain is at a severity of 8/10. The pain is moderate (Improves with Aleve). Associated symptoms include neck pain. Pertinent negatives include no abdominal pain, abnormal behavior, anorexia, back pain, blurred vision, coughing, dizziness, drainage, ear pain, eye pain, eye redness, eye watering, facial sweating, fever, hearing loss, insomnia, loss of balance, muscle aches, nausea, numbness, phonophobia, photophobia, rhinorrhea, scalp tenderness, seizures, sinus pressure, sore throat, swollen glands, tingling, tinnitus, visual change, vomiting, weakness or weight loss. The symptoms are aggravated by emotional stress. Treatments tried: aleve, heat, and " massage. The treatment provided mild relief. His past medical history is significant for hypertension. There is no history of pseudotumor cerebri or recent head traumas.   Constitution: Negative for fever.   HENT:  Negative for ear pain, tinnitus, hearing loss, sinus pressure and sore throat.    Neck: Positive for neck pain.   Eyes:  Negative for eye pain, eye redness, photophobia and blurred vision.   Respiratory:  Negative for cough.    Gastrointestinal:  Negative for abdominal pain, nausea and vomiting.   Musculoskeletal:  Negative for back pain.   Neurological:  Positive for headaches. Negative for dizziness, loss of balance, numbness and seizures.   Psychiatric/Behavioral:  The patient does not have insomnia.      Objective:      Physical Exam   Constitutional: He is oriented to person, place, and time.  Non-toxic appearance. He does not appear ill. No distress.   HENT:   Head: Normocephalic and atraumatic.       Ears:   Right Ear: Tympanic membrane, external ear and ear canal normal.   Left Ear: Tympanic membrane, external ear and ear canal normal.   Nose: Nose normal.   Mouth/Throat: Uvula is midline, oropharynx is clear and moist and mucous membranes are normal.   Face is symmetric      Comments: Face is symmetric  Eyes: Conjunctivae are normal. Pupils are equal, round, and reactive to light. Extraocular movement intact   Neck: pain with movement present. muscular tenderness present.   Cardiovascular: Normal rate.   Pulmonary/Chest: Effort normal. No respiratory distress.   Abdominal: Normal appearance.   Neurological: no focal deficit. He is alert and oriented to person, place, and time. He displays no weakness and normal reflexes. No cranial nerve deficit or sensory deficit. Coordination and gait normal. GCS eye subscore is 4. GCS verbal subscore is 5. GCS motor subscore is 6.      Comments: Alert, oriented x 3. EOMI, PERRLA. Cranial nerves intact: facial expressions (smile, raising eyebrows, shutting  eyes, pursed lips) symmetric. Shoulder shrug strength 5/5; sternocleidomastoid muscle strength 5/5 bilaterally. Jaw is midline without deviation. Tongue protrudes at midline without fasciculations. Sensation to face in distribution of CN V1, V2, and V3 intact. Sensation to upper and lower extremities intact. Finger to nose, rapid rhythmic alternating movements, and heel to shin test are intact and smooth bilaterally. Patient ambulates without assistance, without rigidity or ataxia. Voice quality, comprehension, articulation, coherence assessed as appropriate.       Skin: Skin is not diaphoretic.   Nursing note and vitals reviewed.      Assessment:       1. Nonintractable headache, unspecified chronicity pattern, unspecified headache type          Plan:     Neurological exam unremarkable.   Consistent with Tension headache  Pt reports a lot of stress lately   ED precautions discussed for worsening or new symptoms.  Covid negative    Nonintractable headache, unspecified chronicity pattern, unspecified headache type  -     POCT COVID-19 Rapid Screening  -     ketorolac injection 30 mg            Additional MDM:     NIH Stroke Scale:   Interval = baseline (upon arrival/admit)  Level of consciousness = 0 - alert  LOC questions = 0 - answers both correctly  LOC commands = 0 - performs both correctly  Best gaze = 0 - normal  Visual = 0 - no visual loss  Facial palsy = 0 - normal  Motor left arm =  0 - no drift  Motor right arm =  0 - no drift  Motor left leg = 0 - no drift  Motor right leg =  0 - no drift  Limb ataxia = 0 - absent  Sensory = 0 - normal  Best language = 0 - no aphasia  Dysarthria = 0 - normal articulation  Extinction and inattention = 0 - no neglect  NIH Stroke Scale Total = 0  Patient Instructions                                                       Headache   If your condition worsens or fails to improve we recommend that you receive another evaluation at the ER immediately or contact your PCP to discuss  your concerns or return here. You must understand that you've received an urgent care treatment only and that you may be released before all your medical problems are known or treated. You the [facility].   If you were given narcotic prescriptions or muscle relaxants do not operate heavy equipment or machinery while taking these medications   Get rest and drink fluids. Get plenty of rest.  Watch for increase pain, fever, vomiting, neck pain or mental confusion.   You can also use tylenol or ibuprofen as directed as long as you don't have any allergies to these medications or medical conditions such as ulcers, liver or kidney disease or blood thinners etc that would prevent you from taking these meds.

## 2022-09-19 ENCOUNTER — OFFICE VISIT (OUTPATIENT)
Dept: INTERNAL MEDICINE | Facility: CLINIC | Age: 47
End: 2022-09-19
Payer: COMMERCIAL

## 2022-09-19 ENCOUNTER — LAB VISIT (OUTPATIENT)
Dept: LAB | Facility: HOSPITAL | Age: 47
End: 2022-09-19
Attending: STUDENT IN AN ORGANIZED HEALTH CARE EDUCATION/TRAINING PROGRAM
Payer: COMMERCIAL

## 2022-09-19 VITALS
DIASTOLIC BLOOD PRESSURE: 84 MMHG | OXYGEN SATURATION: 99 % | BODY MASS INDEX: 32.2 KG/M2 | HEART RATE: 76 BPM | WEIGHT: 250.88 LBS | SYSTOLIC BLOOD PRESSURE: 132 MMHG | HEIGHT: 74 IN

## 2022-09-19 DIAGNOSIS — Z00.00 ANNUAL PHYSICAL EXAM: Primary | ICD-10-CM

## 2022-09-19 DIAGNOSIS — E66.9 OBESITY (BMI 30.0-34.9): ICD-10-CM

## 2022-09-19 DIAGNOSIS — I10 HTN (HYPERTENSION), BENIGN: ICD-10-CM

## 2022-09-19 DIAGNOSIS — E05.90 SUBCLINICAL HYPERTHYROIDISM: ICD-10-CM

## 2022-09-19 DIAGNOSIS — F32.9 REACTIVE DEPRESSION: ICD-10-CM

## 2022-09-19 DIAGNOSIS — I10 ESSENTIAL HYPERTENSION: ICD-10-CM

## 2022-09-19 DIAGNOSIS — R73.03 PREDIABETES: ICD-10-CM

## 2022-09-19 DIAGNOSIS — M1A.0710 CHRONIC IDIOPATHIC GOUT INVOLVING TOE OF RIGHT FOOT WITHOUT TOPHUS: ICD-10-CM

## 2022-09-19 DIAGNOSIS — G43.909 MIGRAINE WITHOUT STATUS MIGRAINOSUS, NOT INTRACTABLE, UNSPECIFIED MIGRAINE TYPE: ICD-10-CM

## 2022-09-19 DIAGNOSIS — E66.09 CLASS 1 OBESITY DUE TO EXCESS CALORIES WITH SERIOUS COMORBIDITY AND BODY MASS INDEX (BMI) OF 34.0 TO 34.9 IN ADULT: ICD-10-CM

## 2022-09-19 DIAGNOSIS — M10.9 GOUT, UNSPECIFIED CAUSE, UNSPECIFIED CHRONICITY, UNSPECIFIED SITE: ICD-10-CM

## 2022-09-19 LAB
ALBUMIN/CREAT UR: 5.8 UG/MG (ref 0–30)
CREAT UR-MCNC: 207 MG/DL (ref 23–375)
MICROALBUMIN UR DL<=1MG/L-MCNC: 12 UG/ML

## 2022-09-19 PROCEDURE — 1159F MED LIST DOCD IN RCRD: CPT | Mod: CPTII,S$GLB,, | Performed by: STUDENT IN AN ORGANIZED HEALTH CARE EDUCATION/TRAINING PROGRAM

## 2022-09-19 PROCEDURE — 3079F DIAST BP 80-89 MM HG: CPT | Mod: CPTII,S$GLB,, | Performed by: STUDENT IN AN ORGANIZED HEALTH CARE EDUCATION/TRAINING PROGRAM

## 2022-09-19 PROCEDURE — 99999 PR PBB SHADOW E&M-EST. PATIENT-LVL IV: ICD-10-PCS | Mod: PBBFAC,,, | Performed by: STUDENT IN AN ORGANIZED HEALTH CARE EDUCATION/TRAINING PROGRAM

## 2022-09-19 PROCEDURE — 82570 ASSAY OF URINE CREATININE: CPT | Performed by: STUDENT IN AN ORGANIZED HEALTH CARE EDUCATION/TRAINING PROGRAM

## 2022-09-19 PROCEDURE — 1160F RVW MEDS BY RX/DR IN RCRD: CPT | Mod: CPTII,S$GLB,, | Performed by: STUDENT IN AN ORGANIZED HEALTH CARE EDUCATION/TRAINING PROGRAM

## 2022-09-19 PROCEDURE — 3075F SYST BP GE 130 - 139MM HG: CPT | Mod: CPTII,S$GLB,, | Performed by: STUDENT IN AN ORGANIZED HEALTH CARE EDUCATION/TRAINING PROGRAM

## 2022-09-19 PROCEDURE — 3079F PR MOST RECENT DIASTOLIC BLOOD PRESSURE 80-89 MM HG: ICD-10-PCS | Mod: CPTII,S$GLB,, | Performed by: STUDENT IN AN ORGANIZED HEALTH CARE EDUCATION/TRAINING PROGRAM

## 2022-09-19 PROCEDURE — 1160F PR REVIEW ALL MEDS BY PRESCRIBER/CLIN PHARMACIST DOCUMENTED: ICD-10-PCS | Mod: CPTII,S$GLB,, | Performed by: STUDENT IN AN ORGANIZED HEALTH CARE EDUCATION/TRAINING PROGRAM

## 2022-09-19 PROCEDURE — 99396 PR PREVENTIVE VISIT,EST,40-64: ICD-10-PCS | Mod: S$GLB,,, | Performed by: STUDENT IN AN ORGANIZED HEALTH CARE EDUCATION/TRAINING PROGRAM

## 2022-09-19 PROCEDURE — 3008F PR BODY MASS INDEX (BMI) DOCUMENTED: ICD-10-PCS | Mod: CPTII,S$GLB,, | Performed by: STUDENT IN AN ORGANIZED HEALTH CARE EDUCATION/TRAINING PROGRAM

## 2022-09-19 PROCEDURE — 82043 UR ALBUMIN QUANTITATIVE: CPT | Performed by: STUDENT IN AN ORGANIZED HEALTH CARE EDUCATION/TRAINING PROGRAM

## 2022-09-19 PROCEDURE — 3044F PR MOST RECENT HEMOGLOBIN A1C LEVEL <7.0%: ICD-10-PCS | Mod: CPTII,S$GLB,, | Performed by: STUDENT IN AN ORGANIZED HEALTH CARE EDUCATION/TRAINING PROGRAM

## 2022-09-19 PROCEDURE — 99396 PREV VISIT EST AGE 40-64: CPT | Mod: S$GLB,,, | Performed by: STUDENT IN AN ORGANIZED HEALTH CARE EDUCATION/TRAINING PROGRAM

## 2022-09-19 PROCEDURE — 99999 PR PBB SHADOW E&M-EST. PATIENT-LVL IV: CPT | Mod: PBBFAC,,, | Performed by: STUDENT IN AN ORGANIZED HEALTH CARE EDUCATION/TRAINING PROGRAM

## 2022-09-19 PROCEDURE — 1159F PR MEDICATION LIST DOCUMENTED IN MEDICAL RECORD: ICD-10-PCS | Mod: CPTII,S$GLB,, | Performed by: STUDENT IN AN ORGANIZED HEALTH CARE EDUCATION/TRAINING PROGRAM

## 2022-09-19 PROCEDURE — 3008F BODY MASS INDEX DOCD: CPT | Mod: CPTII,S$GLB,, | Performed by: STUDENT IN AN ORGANIZED HEALTH CARE EDUCATION/TRAINING PROGRAM

## 2022-09-19 PROCEDURE — 3075F PR MOST RECENT SYSTOLIC BLOOD PRESS GE 130-139MM HG: ICD-10-PCS | Mod: CPTII,S$GLB,, | Performed by: STUDENT IN AN ORGANIZED HEALTH CARE EDUCATION/TRAINING PROGRAM

## 2022-09-19 PROCEDURE — 3044F HG A1C LEVEL LT 7.0%: CPT | Mod: CPTII,S$GLB,, | Performed by: STUDENT IN AN ORGANIZED HEALTH CARE EDUCATION/TRAINING PROGRAM

## 2022-09-19 RX ORDER — BUPROPION HYDROCHLORIDE 150 MG/1
150 TABLET ORAL EVERY MORNING
Qty: 90 TABLET | Refills: 3 | Status: SHIPPED | OUTPATIENT
Start: 2022-09-19 | End: 2022-11-09 | Stop reason: DRUGHIGH

## 2022-09-19 RX ORDER — INDOMETHACIN 50 MG/1
50 CAPSULE ORAL
Qty: 90 CAPSULE | Refills: 3 | Status: SHIPPED | OUTPATIENT
Start: 2022-09-19 | End: 2022-10-28 | Stop reason: SDUPTHER

## 2022-09-19 RX ORDER — SUMATRIPTAN 50 MG/1
50 TABLET, FILM COATED ORAL EVERY 4 HOURS PRN
Qty: 15 TABLET | Refills: 0 | Status: SHIPPED | OUTPATIENT
Start: 2022-09-19 | End: 2022-10-17

## 2022-09-19 NOTE — PROGRESS NOTES
SUBJECTIVE     Chief Complaint   Patient presents with    Establish Care       PHANI  Jose Fox is a 47 y.o. male with medical diagnoses as listed in the medical history and problem list that presents for annual exam. Pt is establishing care with me today.. He has a good appetite and eats well. He does not exercise. He sleeps for ~4 hours nightly. Pt does take OTC supplements. He does have current stressors. Pt is not UTD on age appropriate CA screening.    Family, social, surgical Hx reviewed     Health Maintenance         Date Due Completion Date    Hepatitis C Screening Never done ---    HIV Screening Never done ---    TETANUS VACCINE Never done ---    COVID-19 Vaccine (3 - Booster for Moderna series) 09/27/2021 4/27/2021    Influenza Vaccine (1) 09/01/2022 10/29/2021    Lipid Panel 10/29/2022 10/29/2021    Colorectal Cancer Screening 02/03/2023 2/3/2022            Health Maintenance:  Prostate CA screening:n/a  Colorectal CA screening: utd  Cholesterol: (q5yr>21yo) due  Vaccines: Influenza, tdap, covid booster due  A1c: due  Diet and Exercise: Discussed current recommendations     Chronic Conditions:    HTN -   Currently prescribed losartan-hctz 100-12.5 daily  Patient endorses taking medication as directed.  Denies side effects or concerns while taking medication.  Patient not currently checking BP at home.  Denies headaches, vision changes, CP, palpitations, or other concerning symptoms.  Due for micro albumin creatinine ratio  No results found for: MICALBCREAT  BP Readings from Last 3 Encounters:   09/19/22 132/84   09/04/22 121/83   05/09/22 132/88     Prediabetes:  Obesity:  -Ozempic 1 weekly  -HgA1c due  -Restarted going to gym recently    Subclinical hyperthyroidism:  -Due for repeat TSH  -Hx US thyroid 10/2021 which showed diffusely enlarged thyroid    Depression:   -On Fluoxetine 10mg daily  Recently lost his son and feels his dose is not adequate  -No SI/HI/AVH    Migraine headaches:  -Urgent care  visit 9/4/2022 for migraine headache  - + light sensitivity   - +Throbbing behind head  -Never had headaches prior to  -associated with n/v  -No sxs since urgent care visit        PAST MEDICAL HISTORY:  Past Medical History:   Diagnosis Date    Gout     Hypertension     Hypothyroidism     Thyroid disease        PAST SURGICAL HISTORY:  Past Surgical History:   Procedure Laterality Date    CARDIAC ELECTROPHYSIOLOGY STUDY AND ABLATION  2010    LEG SURGERY Left        SOCIAL HISTORY:  Social History     Socioeconomic History    Marital status:     Number of children: 3   Occupational History    Occupation: [employer]      Comment: [email or web address]   Tobacco Use    Smoking status: Never    Smokeless tobacco: Never   Substance and Sexual Activity    Alcohol use: Never    Drug use: Never    Sexual activity: Yes     Partners: Female   Social History Narrative    23.18    25      Social Determinants of Health     Financial Resource Strain: Low Risk     Difficulty of Paying Living Expenses: Not hard at all   Food Insecurity: No Food Insecurity    Worried About Running Out of Food in the Last Year: Never true    Ran Out of Food in the Last Year: Never true   Transportation Needs: No Transportation Needs    Lack of Transportation (Medical): No    Lack of Transportation (Non-Medical): No   Physical Activity: Sufficiently Active    Days of Exercise per Week: 4 days    Minutes of Exercise per Session: 80 min   Stress: Stress Concern Present    Feeling of Stress : Very much   Social Connections: Unknown    Frequency of Communication with Friends and Family: More than three times a week    Frequency of Social Gatherings with Friends and Family: Once a week    Active Member of Clubs or Organizations: No    Attends Club or Organization Meetings: Never    Marital Status:    Housing Stability: Low Risk     Unable to Pay for Housing in the Last Year: No    Number of Places Lived in the Last Year: 1    Unstable Housing in the  Last Year: No       FAMILY HISTORY:  Family History   Problem Relation Age of Onset    Hypertension Mother     Hypertension Father     Diabetes Father        ALLERGIES AND MEDICATIONS: updated and reviewed.  Review of patient's allergies indicates:  No Known Allergies  Current Outpatient Medications   Medication Sig Dispense Refill    diclofenac sodium (VOLTAREN) 1 % Gel Apply 2 g topically 4 (four) times daily. 100 g 2    febuxostat (ULORIC) 40 mg Tab Take 1 tablet (40 mg total) by mouth once daily. 90 tablet 3    losartan-hydrochlorothiazide 100-12.5 mg (HYZAAR) 100-12.5 mg Tab Take 1 tablet by mouth once daily. 90 tablet 3    allopurinoL (ZYLOPRIM) 100 MG tablet Take 100 mg by mouth once daily.      buPROPion (WELLBUTRIN XL) 150 MG TB24 tablet Take 1 tablet (150 mg total) by mouth every morning. 90 tablet 3    indomethacin (INDOCIN) 50 MG capsule Take 1 capsule (50 mg total) by mouth every meal as needed (For acute flare-up of gout). 90 capsule 3    semaglutide (OZEMPIC) 1 mg/dose (2 mg/1.5 mL) PnIj Inject 1 mg into the skin every 7 days. 2 pen 11    sumatriptan (IMITREX) 50 MG tablet Take 1 tablet (50 mg total) by mouth every 4 (four) hours as needed for Migraine. Not to exceed 3 tabs per day 15 tablet 0     No current facility-administered medications for this visit.       ROS  Review of Systems   Constitutional:  Negative for fever and weight loss.   Respiratory:  Negative for cough and shortness of breath.    Cardiovascular:  Negative for chest pain and palpitations.   Gastrointestinal:  Negative for abdominal pain, constipation, diarrhea, nausea and vomiting.   Genitourinary:  Negative for dysuria.   Musculoskeletal:  Negative for back pain and joint pain.   Skin:  Negative for rash.   Neurological:  Negative for dizziness, weakness and headaches.   Psychiatric/Behavioral:  Positive for depression. The patient is not nervous/anxious.          OBJECTIVE     Physical Exam  Vitals:    09/19/22 1304   BP: 132/84  "  Pulse: 76    Body mass index is 32.21 kg/m².  Weight: 113.8 kg (250 lb 14.1 oz)   Height: 6' 2" (188 cm)     Physical Exam  HENT:      Head: Normocephalic and atraumatic.      Nose: Nose normal.      Mouth/Throat:      Mouth: Mucous membranes are moist.      Pharynx: Oropharynx is clear.   Eyes:      Extraocular Movements: Extraocular movements intact.      Conjunctiva/sclera: Conjunctivae normal.      Pupils: Pupils are equal, round, and reactive to light.   Cardiovascular:      Rate and Rhythm: Normal rate and regular rhythm.   Pulmonary:      Effort: Pulmonary effort is normal.      Breath sounds: Normal breath sounds.   Abdominal:      General: There is no distension.      Palpations: Abdomen is soft.      Tenderness: There is no abdominal tenderness.   Musculoskeletal:         General: No swelling. Normal range of motion.      Cervical back: Normal range of motion.      Right lower leg: No edema.      Left lower leg: No edema.   Skin:     General: Skin is warm.      Findings: No lesion or rash.   Neurological:      General: No focal deficit present.      Mental Status: He is alert and oriented to person, place, and time.      Motor: No weakness.   Psychiatric:         Mood and Affect: Mood normal.         Thought Content: Thought content normal.             ASSESSMENT     47 y.o. male with     1. Annual physical exam    2. Prediabetes    3. Subclinical hyperthyroidism    4. Class 1 obesity due to excess calories with serious comorbidity and body mass index (BMI) of 34.0 to 34.9 in adult    5. Reactive depression    6. HTN (hypertension), benign    7. Obesity (BMI 30.0-34.9)    8. Chronic idiopathic gout involving toe of right foot without tophus    9. Gout, unspecified cause, unspecified chronicity, unspecified site    10. Migraine without status migrainosus, not intractable, unspecified migraine type    11. Essential hypertension        PLAN:     1. Annual physical exam  -     Hemoglobin A1C  -     HIV 1/2 " Ag/Ab (4th Gen)  -     Hepatitis C Antibody  -     T4, Free  -     TSH  -     Lipid Panel  -     Comprehensive Metabolic Panel  -     CBC Auto Differential    2. Prediabetes  -     Hemoglobin A1C  -     Lipid Panel  -     Comprehensive Metabolic Panel  -     CBC Auto Differential  -     semaglutide (OZEMPIC) 1 mg/dose (2 mg/1.5 mL) PnIj    3. Subclinical hyperthyroidism  Overview:  -Hx US thyroid 10/2021 which showed diffusely enlarged thyroid    Assessment & Plan:  Labs ordered today    Orders:  -     T4, Free  -     TSH  -     CBC Auto Differential  -     T3    4. Class 1 obesity due to excess calories with serious comorbidity and body mass index (BMI) of 34.0 to 34.9 in adult  Overview:  Ozempic 1 weekly    Assessment & Plan:  Continue Ozempic weekly, stable on current dose  Wellbutrin started today      Orders:  -     Lipid Panel  -     CBC Auto Differential    5. Reactive depression  Overview:  Previously on Fluoxetine    Assessment & Plan:  D/c fluoxetine  Wellbutrin started today  Follow up in 1 month, titrate as appropriate    Orders:  -     buPROPion (WELLBUTRIN XL) 150 MG TB24 tablet    6. HTN (hypertension), benign  -     Lipid Panel  -     Comprehensive Metabolic Panel  -     Microalbumin/Creatinine Ratio, Urine    7. Obesity (BMI 30.0-34.9)  -     semaglutide (OZEMPIC) 1 mg/dose (2 mg/1.5 mL) PnIj    8. Chronic idiopathic gout involving toe of right foot without tophus  Overview:  Allopurinol daily  Indomethacin prn for flares    Assessment & Plan:  Stable on medications, continue regimen      Orders:  -     indomethacin (INDOCIN) 50 MG capsule    9. Gout, unspecified cause, unspecified chronicity, unspecified site  Overview:  Allopurinol daily  Indomethacin prn for flares    Assessment & Plan:  Stable on medications, continue regimen      Orders:  -     indomethacin (INDOCIN) 50 MG capsule    10. Migraine without status migrainosus, not intractable, unspecified migraine type  Overview:  Not currently  symptomatic    Assessment & Plan:  Sumatriptan prn for migraines, not to exceed 10 days/month    Discussed Lifestyle measures:  -Good sleep hygeine  -Routine meal schedule  -Regular exercise  -Managing migraine triggers.  -     sumatriptan (IMITREX) 50 MG tablet    11. Essential hypertension  Overview:  losartan-hctz 100-12.5 daily    Assessment & Plan:  Stable on medications, continue regimen              Discussed age and gender appropriate screenings at this visit and encouraged a healthy diet low in simple carbohydrates, and increased physical activity.  Counseled on medically appropriate vaccines based on age and current health condition.  Screening test reviewed and discussed with patient.       RTC in 3-4 months    Mari García MD  09/19/2022 12:44 PM        No follow-ups on file.

## 2022-09-19 NOTE — ASSESSMENT & PLAN NOTE
Sumatriptan prn for migraines, not to exceed 10 days/month    Discussed Lifestyle measures:  -Good sleep hygeine  -Routine meal schedule  -Regular exercise  -Managing migraine triggers.

## 2022-09-26 ENCOUNTER — IMMUNIZATION (OUTPATIENT)
Dept: INTERNAL MEDICINE | Facility: CLINIC | Age: 47
End: 2022-09-26
Payer: COMMERCIAL

## 2022-09-26 ENCOUNTER — LAB VISIT (OUTPATIENT)
Dept: LAB | Facility: HOSPITAL | Age: 47
End: 2022-09-26
Attending: STUDENT IN AN ORGANIZED HEALTH CARE EDUCATION/TRAINING PROGRAM
Payer: COMMERCIAL

## 2022-09-26 DIAGNOSIS — E05.90 SUBCLINICAL HYPERTHYROIDISM: ICD-10-CM

## 2022-09-26 DIAGNOSIS — R94.4 DECREASED GLOMERULAR FILTRATION RATE (GFR): ICD-10-CM

## 2022-09-26 DIAGNOSIS — Z00.00 ANNUAL PHYSICAL EXAM: ICD-10-CM

## 2022-09-26 DIAGNOSIS — R73.03 PREDIABETES: ICD-10-CM

## 2022-09-26 DIAGNOSIS — E66.09 CLASS 1 OBESITY DUE TO EXCESS CALORIES WITH SERIOUS COMORBIDITY AND BODY MASS INDEX (BMI) OF 34.0 TO 34.9 IN ADULT: ICD-10-CM

## 2022-09-26 DIAGNOSIS — Z23 NEED FOR VACCINATION: Primary | ICD-10-CM

## 2022-09-26 DIAGNOSIS — I10 HTN (HYPERTENSION), BENIGN: ICD-10-CM

## 2022-09-26 DIAGNOSIS — R79.89 ELEVATED SERUM CREATININE: Primary | ICD-10-CM

## 2022-09-26 LAB
ALBUMIN SERPL BCP-MCNC: 3.7 G/DL (ref 3.5–5.2)
ALP SERPL-CCNC: 52 U/L (ref 55–135)
ALT SERPL W/O P-5'-P-CCNC: 20 U/L (ref 10–44)
ANION GAP SERPL CALC-SCNC: 7 MMOL/L (ref 8–16)
AST SERPL-CCNC: 21 U/L (ref 10–40)
BASOPHILS # BLD AUTO: 0.06 K/UL (ref 0–0.2)
BASOPHILS NFR BLD: 1 % (ref 0–1.9)
BILIRUB SERPL-MCNC: 0.7 MG/DL (ref 0.1–1)
BUN SERPL-MCNC: 19 MG/DL (ref 6–20)
CALCIUM SERPL-MCNC: 9.2 MG/DL (ref 8.7–10.5)
CHLORIDE SERPL-SCNC: 110 MMOL/L (ref 95–110)
CHOLEST SERPL-MCNC: 127 MG/DL (ref 120–199)
CHOLEST/HDLC SERPL: 3.7 {RATIO} (ref 2–5)
CO2 SERPL-SCNC: 24 MMOL/L (ref 23–29)
CREAT SERPL-MCNC: 1.5 MG/DL (ref 0.5–1.4)
DIFFERENTIAL METHOD: ABNORMAL
EOSINOPHIL # BLD AUTO: 0.2 K/UL (ref 0–0.5)
EOSINOPHIL NFR BLD: 3.1 % (ref 0–8)
ERYTHROCYTE [DISTWIDTH] IN BLOOD BY AUTOMATED COUNT: 13 % (ref 11.5–14.5)
EST. GFR  (NO RACE VARIABLE): 56.7 ML/MIN/1.73 M^2
ESTIMATED AVG GLUCOSE: 111 MG/DL (ref 68–131)
GLUCOSE SERPL-MCNC: 123 MG/DL (ref 70–110)
HBA1C MFR BLD: 5.5 % (ref 4–5.6)
HCT VFR BLD AUTO: 43.7 % (ref 40–54)
HCV AB SERPL QL IA: NORMAL
HDLC SERPL-MCNC: 34 MG/DL (ref 40–75)
HDLC SERPL: 26.8 % (ref 20–50)
HGB BLD-MCNC: 14.4 G/DL (ref 14–18)
HIV 1+2 AB+HIV1 P24 AG SERPL QL IA: NORMAL
IMM GRANULOCYTES # BLD AUTO: 0.04 K/UL (ref 0–0.04)
IMM GRANULOCYTES NFR BLD AUTO: 0.7 % (ref 0–0.5)
LDLC SERPL CALC-MCNC: 82.6 MG/DL (ref 63–159)
LYMPHOCYTES # BLD AUTO: 1.9 K/UL (ref 1–4.8)
LYMPHOCYTES NFR BLD: 31.8 % (ref 18–48)
MCH RBC QN AUTO: 29.3 PG (ref 27–31)
MCHC RBC AUTO-ENTMCNC: 33 G/DL (ref 32–36)
MCV RBC AUTO: 89 FL (ref 82–98)
MONOCYTES # BLD AUTO: 0.5 K/UL (ref 0.3–1)
MONOCYTES NFR BLD: 7.7 % (ref 4–15)
NEUTROPHILS # BLD AUTO: 3.3 K/UL (ref 1.8–7.7)
NEUTROPHILS NFR BLD: 55.7 % (ref 38–73)
NONHDLC SERPL-MCNC: 93 MG/DL
NRBC BLD-RTO: 0 /100 WBC
PLATELET # BLD AUTO: 241 K/UL (ref 150–450)
PMV BLD AUTO: 10.2 FL (ref 9.2–12.9)
POTASSIUM SERPL-SCNC: 4.2 MMOL/L (ref 3.5–5.1)
PROT SERPL-MCNC: 6.9 G/DL (ref 6–8.4)
RBC # BLD AUTO: 4.91 M/UL (ref 4.6–6.2)
SODIUM SERPL-SCNC: 141 MMOL/L (ref 136–145)
T3 SERPL-MCNC: 92 NG/DL (ref 60–180)
T4 FREE SERPL-MCNC: 1.25 NG/DL (ref 0.71–1.51)
TRIGL SERPL-MCNC: 52 MG/DL (ref 30–150)
TSH SERPL DL<=0.005 MIU/L-ACNC: 0.99 UIU/ML (ref 0.4–4)
WBC # BLD AUTO: 5.88 K/UL (ref 3.9–12.7)

## 2022-09-26 PROCEDURE — 84439 ASSAY OF FREE THYROXINE: CPT | Performed by: STUDENT IN AN ORGANIZED HEALTH CARE EDUCATION/TRAINING PROGRAM

## 2022-09-26 PROCEDURE — 84480 ASSAY TRIIODOTHYRONINE (T3): CPT | Performed by: STUDENT IN AN ORGANIZED HEALTH CARE EDUCATION/TRAINING PROGRAM

## 2022-09-26 PROCEDURE — 90471 IMMUNIZATION ADMIN: CPT | Mod: S$GLB,,, | Performed by: INTERNAL MEDICINE

## 2022-09-26 PROCEDURE — 83036 HEMOGLOBIN GLYCOSYLATED A1C: CPT | Performed by: STUDENT IN AN ORGANIZED HEALTH CARE EDUCATION/TRAINING PROGRAM

## 2022-09-26 PROCEDURE — 36415 COLL VENOUS BLD VENIPUNCTURE: CPT | Performed by: STUDENT IN AN ORGANIZED HEALTH CARE EDUCATION/TRAINING PROGRAM

## 2022-09-26 PROCEDURE — 91312 COVID-19, MRNA, LNP-S, BIVALENT BOOSTER, PF, 30 MCG/0.3 ML DOSE: ICD-10-PCS | Mod: S$GLB,,, | Performed by: INTERNAL MEDICINE

## 2022-09-26 PROCEDURE — 85025 COMPLETE CBC W/AUTO DIFF WBC: CPT | Performed by: STUDENT IN AN ORGANIZED HEALTH CARE EDUCATION/TRAINING PROGRAM

## 2022-09-26 PROCEDURE — 0124A COVID-19, MRNA, LNP-S, BIVALENT BOOSTER, PF, 30 MCG/0.3 ML DOSE: CPT | Mod: CV19,PBBFAC | Performed by: INTERNAL MEDICINE

## 2022-09-26 PROCEDURE — 91312 COVID-19, MRNA, LNP-S, BIVALENT BOOSTER, PF, 30 MCG/0.3 ML DOSE: CPT | Mod: S$GLB,,, | Performed by: INTERNAL MEDICINE

## 2022-09-26 PROCEDURE — 90686 IIV4 VACC NO PRSV 0.5 ML IM: CPT | Mod: S$GLB,,, | Performed by: INTERNAL MEDICINE

## 2022-09-26 PROCEDURE — 84443 ASSAY THYROID STIM HORMONE: CPT | Performed by: STUDENT IN AN ORGANIZED HEALTH CARE EDUCATION/TRAINING PROGRAM

## 2022-09-26 PROCEDURE — 90471 FLU VACCINE (QUAD) GREATER THAN OR EQUAL TO 3YO PRESERVATIVE FREE IM: ICD-10-PCS | Mod: S$GLB,,, | Performed by: INTERNAL MEDICINE

## 2022-09-26 PROCEDURE — 87389 HIV-1 AG W/HIV-1&-2 AB AG IA: CPT | Performed by: STUDENT IN AN ORGANIZED HEALTH CARE EDUCATION/TRAINING PROGRAM

## 2022-09-26 PROCEDURE — 80061 LIPID PANEL: CPT | Performed by: STUDENT IN AN ORGANIZED HEALTH CARE EDUCATION/TRAINING PROGRAM

## 2022-09-26 PROCEDURE — 86803 HEPATITIS C AB TEST: CPT | Performed by: STUDENT IN AN ORGANIZED HEALTH CARE EDUCATION/TRAINING PROGRAM

## 2022-09-26 PROCEDURE — 80053 COMPREHEN METABOLIC PANEL: CPT | Performed by: STUDENT IN AN ORGANIZED HEALTH CARE EDUCATION/TRAINING PROGRAM

## 2022-09-26 PROCEDURE — 90686 FLU VACCINE (QUAD) GREATER THAN OR EQUAL TO 3YO PRESERVATIVE FREE IM: ICD-10-PCS | Mod: S$GLB,,, | Performed by: INTERNAL MEDICINE

## 2022-10-06 ENCOUNTER — PATIENT MESSAGE (OUTPATIENT)
Dept: INTERNAL MEDICINE | Facility: CLINIC | Age: 47
End: 2022-10-06
Payer: COMMERCIAL

## 2022-11-09 ENCOUNTER — OFFICE VISIT (OUTPATIENT)
Dept: INTERNAL MEDICINE | Facility: CLINIC | Age: 47
End: 2022-11-09
Payer: COMMERCIAL

## 2022-11-09 ENCOUNTER — TELEPHONE (OUTPATIENT)
Dept: INTERNAL MEDICINE | Facility: CLINIC | Age: 47
End: 2022-11-09
Payer: COMMERCIAL

## 2022-11-09 VITALS
WEIGHT: 247.56 LBS | DIASTOLIC BLOOD PRESSURE: 80 MMHG | SYSTOLIC BLOOD PRESSURE: 128 MMHG | BODY MASS INDEX: 31.77 KG/M2 | HEIGHT: 74 IN | OXYGEN SATURATION: 100 % | HEART RATE: 80 BPM

## 2022-11-09 DIAGNOSIS — I10 ESSENTIAL HYPERTENSION: Primary | ICD-10-CM

## 2022-11-09 DIAGNOSIS — R73.03 PREDIABETES: ICD-10-CM

## 2022-11-09 DIAGNOSIS — G47.00 INSOMNIA, UNSPECIFIED TYPE: ICD-10-CM

## 2022-11-09 DIAGNOSIS — E05.90 SUBCLINICAL HYPERTHYROIDISM: ICD-10-CM

## 2022-11-09 DIAGNOSIS — F32.A DEPRESSION, UNSPECIFIED DEPRESSION TYPE: ICD-10-CM

## 2022-11-09 PROBLEM — E66.9 CLASS 1 OBESITY WITH BODY MASS INDEX (BMI) OF 34.0 TO 34.9 IN ADULT: Status: RESOLVED | Noted: 2022-05-09 | Resolved: 2022-11-09

## 2022-11-09 PROBLEM — E66.811 CLASS 1 OBESITY WITH BODY MASS INDEX (BMI) OF 34.0 TO 34.9 IN ADULT: Status: RESOLVED | Noted: 2022-05-09 | Resolved: 2022-11-09

## 2022-11-09 PROCEDURE — 3074F PR MOST RECENT SYSTOLIC BLOOD PRESSURE < 130 MM HG: ICD-10-PCS | Mod: CPTII,S$GLB,, | Performed by: STUDENT IN AN ORGANIZED HEALTH CARE EDUCATION/TRAINING PROGRAM

## 2022-11-09 PROCEDURE — 3061F PR NEG MICROALBUMINURIA RESULT DOCUMENTED/REVIEW: ICD-10-PCS | Mod: CPTII,S$GLB,, | Performed by: STUDENT IN AN ORGANIZED HEALTH CARE EDUCATION/TRAINING PROGRAM

## 2022-11-09 PROCEDURE — 1160F RVW MEDS BY RX/DR IN RCRD: CPT | Mod: CPTII,S$GLB,, | Performed by: STUDENT IN AN ORGANIZED HEALTH CARE EDUCATION/TRAINING PROGRAM

## 2022-11-09 PROCEDURE — 3066F NEPHROPATHY DOC TX: CPT | Mod: CPTII,S$GLB,, | Performed by: STUDENT IN AN ORGANIZED HEALTH CARE EDUCATION/TRAINING PROGRAM

## 2022-11-09 PROCEDURE — 3074F SYST BP LT 130 MM HG: CPT | Mod: CPTII,S$GLB,, | Performed by: STUDENT IN AN ORGANIZED HEALTH CARE EDUCATION/TRAINING PROGRAM

## 2022-11-09 PROCEDURE — 1159F MED LIST DOCD IN RCRD: CPT | Mod: CPTII,S$GLB,, | Performed by: STUDENT IN AN ORGANIZED HEALTH CARE EDUCATION/TRAINING PROGRAM

## 2022-11-09 PROCEDURE — 3008F BODY MASS INDEX DOCD: CPT | Mod: CPTII,S$GLB,, | Performed by: STUDENT IN AN ORGANIZED HEALTH CARE EDUCATION/TRAINING PROGRAM

## 2022-11-09 PROCEDURE — 3061F NEG MICROALBUMINURIA REV: CPT | Mod: CPTII,S$GLB,, | Performed by: STUDENT IN AN ORGANIZED HEALTH CARE EDUCATION/TRAINING PROGRAM

## 2022-11-09 PROCEDURE — 99214 PR OFFICE/OUTPT VISIT, EST, LEVL IV, 30-39 MIN: ICD-10-PCS | Mod: S$GLB,,, | Performed by: STUDENT IN AN ORGANIZED HEALTH CARE EDUCATION/TRAINING PROGRAM

## 2022-11-09 PROCEDURE — 3066F PR DOCUMENTATION OF TREATMENT FOR NEPHROPATHY: ICD-10-PCS | Mod: CPTII,S$GLB,, | Performed by: STUDENT IN AN ORGANIZED HEALTH CARE EDUCATION/TRAINING PROGRAM

## 2022-11-09 PROCEDURE — 3079F PR MOST RECENT DIASTOLIC BLOOD PRESSURE 80-89 MM HG: ICD-10-PCS | Mod: CPTII,S$GLB,, | Performed by: STUDENT IN AN ORGANIZED HEALTH CARE EDUCATION/TRAINING PROGRAM

## 2022-11-09 PROCEDURE — 99999 PR PBB SHADOW E&M-EST. PATIENT-LVL IV: ICD-10-PCS | Mod: PBBFAC,,, | Performed by: STUDENT IN AN ORGANIZED HEALTH CARE EDUCATION/TRAINING PROGRAM

## 2022-11-09 PROCEDURE — 99214 OFFICE O/P EST MOD 30 MIN: CPT | Mod: S$GLB,,, | Performed by: STUDENT IN AN ORGANIZED HEALTH CARE EDUCATION/TRAINING PROGRAM

## 2022-11-09 PROCEDURE — 3044F PR MOST RECENT HEMOGLOBIN A1C LEVEL <7.0%: ICD-10-PCS | Mod: CPTII,S$GLB,, | Performed by: STUDENT IN AN ORGANIZED HEALTH CARE EDUCATION/TRAINING PROGRAM

## 2022-11-09 PROCEDURE — 1159F PR MEDICATION LIST DOCUMENTED IN MEDICAL RECORD: ICD-10-PCS | Mod: CPTII,S$GLB,, | Performed by: STUDENT IN AN ORGANIZED HEALTH CARE EDUCATION/TRAINING PROGRAM

## 2022-11-09 PROCEDURE — 1160F PR REVIEW ALL MEDS BY PRESCRIBER/CLIN PHARMACIST DOCUMENTED: ICD-10-PCS | Mod: CPTII,S$GLB,, | Performed by: STUDENT IN AN ORGANIZED HEALTH CARE EDUCATION/TRAINING PROGRAM

## 2022-11-09 PROCEDURE — 3079F DIAST BP 80-89 MM HG: CPT | Mod: CPTII,S$GLB,, | Performed by: STUDENT IN AN ORGANIZED HEALTH CARE EDUCATION/TRAINING PROGRAM

## 2022-11-09 PROCEDURE — 3044F HG A1C LEVEL LT 7.0%: CPT | Mod: CPTII,S$GLB,, | Performed by: STUDENT IN AN ORGANIZED HEALTH CARE EDUCATION/TRAINING PROGRAM

## 2022-11-09 PROCEDURE — 3008F PR BODY MASS INDEX (BMI) DOCUMENTED: ICD-10-PCS | Mod: CPTII,S$GLB,, | Performed by: STUDENT IN AN ORGANIZED HEALTH CARE EDUCATION/TRAINING PROGRAM

## 2022-11-09 PROCEDURE — 99999 PR PBB SHADOW E&M-EST. PATIENT-LVL IV: CPT | Mod: PBBFAC,,, | Performed by: STUDENT IN AN ORGANIZED HEALTH CARE EDUCATION/TRAINING PROGRAM

## 2022-11-09 RX ORDER — TRAZODONE HYDROCHLORIDE 50 MG/1
50 TABLET ORAL NIGHTLY
Qty: 90 TABLET | Refills: 3 | Status: SHIPPED | OUTPATIENT
Start: 2022-11-09 | End: 2023-05-04

## 2022-11-09 RX ORDER — BUPROPION HYDROCHLORIDE 300 MG/1
300 TABLET ORAL DAILY
Qty: 30 TABLET | Refills: 11 | Status: SHIPPED | OUTPATIENT
Start: 2022-11-09 | End: 2023-11-10 | Stop reason: SDUPTHER

## 2022-11-09 NOTE — TELEPHONE ENCOUNTER
----- Message from Jennyfer Ramos sent at 11/9/2022  7:38 AM CST -----  Contact: [phone]  Patient went to [facility] and is on his way, please call and advise.

## 2022-11-09 NOTE — PROGRESS NOTES
SUBJECTIVE     Chief Complaint   Patient presents with    Follow-up     Discuss test results       HPI  Jose Fox is a 47 y.o. male with medical diagnoses as listed in the medical history and problem list that presents for hypertension and depression follow-up Pt has been doing well since our last appointment.      HTN -   Currently prescribed losartan-hctz 100-12.5 daily  Patient endorses taking medication as directed.  Denies side effects or concerns while taking medication.  Patient not currently checking BP at home.  Denies headaches, vision changes, CP, palpitations, or other concerning symptoms.  Due for micro albumin creatinine ratio  Lab Results   Component Value Date    MICALBCREAT 5.8 09/19/2022     BP Readings from Last 3 Encounters:   11/09/22 128/80   09/19/22 132/84   09/04/22 121/83     Prediabetes:  Obesity:  -Ozempic 1 weekly  -recent hemoglobin A1c in normal range  -Restarted going to gym recently    Subclinical hyperthyroidism:  -TSH within normal limits on lab performed 9/2022  -Hx US thyroid 10/2021 which showed diffusely enlarged thyroid    Depression:    last appointment was started on Wellbutrin.  Now up titrated to Wellbutrin 300 mg daily.  Previously on fluoxetine 10 mg daily  Tolerating medication well.  Symptoms stable on current dose.  Denies HI/SI/AVH  Patient denies feeling hopeless, decreased pleasure in doing things, decreased energy, eating changes, or difficulty concentrating.  *Does continue to endorse insomnia and reports feeling very tired during the day as a result.          PAST MEDICAL HISTORY:  Past Medical History:   Diagnosis Date    Gout     Hypertension     Hypothyroidism     Thyroid disease        PAST SURGICAL HISTORY:  Past Surgical History:   Procedure Laterality Date    CARDIAC ELECTROPHYSIOLOGY STUDY AND ABLATION  2010    LEG SURGERY Left        SOCIAL HISTORY:  Social History     Socioeconomic History    Marital status:     Number of children: 3    Occupational History    Occupation: [employer]      Comment: [employer]   Tobacco Use    Smoking status: Never    Smokeless tobacco: Never   Substance and Sexual Activity    Alcohol use: Never    Drug use: Never    Sexual activity: Yes     Partners: Female   Social History Narrative    23.18    25      Social Determinants of Health     Financial Resource Strain: Low Risk     Difficulty of Paying Living Expenses: Not hard at all   Food Insecurity: No Food Insecurity    Worried About Running Out of Food in the Last Year: Never true    Ran Out of Food in the Last Year: Never true   Transportation Needs: No Transportation Needs    Lack of Transportation (Medical): No    Lack of Transportation (Non-Medical): No   Physical Activity: Sufficiently Active    Days of Exercise per Week: 3 days    Minutes of Exercise per Session: 80 min   Stress: Stress Concern Present    Feeling of Stress : To some extent   Social Connections: Unknown    Frequency of Communication with Friends and Family: More than three times a week    Frequency of Social Gatherings with Friends and Family: Never    Active Member of Clubs or Organizations: No    Attends Club or Organization Meetings: Patient refused    Marital Status:    Housing Stability: Low Risk     Unable to Pay for Housing in the Last Year: No    Number of Places Lived in the Last Year: 1    Unstable Housing in the Last Year: No       FAMILY HISTORY:  Family History   Problem Relation Age of Onset    Hypertension Mother     Hypertension Father     Diabetes Father        ALLERGIES AND MEDICATIONS: updated and reviewed.  Review of patient's allergies indicates:  No Known Allergies  Current Outpatient Medications   Medication Sig Dispense Refill    allopurinoL (ZYLOPRIM) 100 MG tablet Take 100 mg by mouth once daily.      diclofenac sodium (VOLTAREN) 1 % Gel Apply 2 g topically 4 (four) times daily. 100 g 2    febuxostat (ULORIC) 40 mg Tab Take 1 tablet (40 mg total) by mouth  "once daily. 90 tablet 3    indomethacin (INDOCIN) 50 MG capsule Take 1 capsule (50 mg total) by mouth every meal as needed (For acute flare-up of gout). 90 capsule 3    losartan-hydrochlorothiazide 100-12.5 mg (HYZAAR) 100-12.5 mg Tab Take 1 tablet by mouth once daily. 90 tablet 3    semaglutide (OZEMPIC) 1 mg/dose (2 mg/1.5 mL) PnIj Inject 1 mg into the skin every 7 days. 2 pen 11    sumatriptan (IMITREX) 50 MG tablet TAKE 1 TABLET (50 MG TOTAL) BY MOUTH EVERY 4 (FOUR) HOURS AS NEEDED FOR MIGRAINE. NOT TO EXCEED 3 TABS PER DAY 12 tablet 1    buPROPion (WELLBUTRIN XL) 300 MG 24 hr tablet Take 1 tablet (300 mg total) by mouth once daily. 30 tablet 11    traZODone (DESYREL) 50 MG tablet Take 1 tablet (50 mg total) by mouth every evening. 90 tablet 3     No current facility-administered medications for this visit.       ROS  Review of Systems   Constitutional:  Negative for fever and weight loss.   HENT:  Negative for hearing loss.    Eyes:  Negative for discharge.   Respiratory:  Negative for cough, shortness of breath and wheezing.    Cardiovascular:  Negative for chest pain and palpitations.   Gastrointestinal:  Negative for abdominal pain, constipation, diarrhea, nausea and vomiting.   Genitourinary:  Negative for dysuria and hematuria.   Musculoskeletal:  Negative for back pain, joint pain and neck pain.   Skin:  Negative for rash.   Neurological:  Negative for dizziness, weakness and headaches.   Psychiatric/Behavioral:  Positive for depression. The patient is not nervous/anxious.          OBJECTIVE     Physical Exam  Vitals:    11/09/22 0911   BP: 128/80   Pulse:     Body mass index is 31.79 kg/m².  Weight: 112.3 kg (247 lb 9.2 oz)   Height: 6' 2" (188 cm)     Physical Exam  HENT:      Head: Normocephalic and atraumatic.      Nose: Nose normal.      Mouth/Throat:      Mouth: Mucous membranes are moist.      Pharynx: Oropharynx is clear.   Eyes:      Extraocular Movements: Extraocular movements intact.      " Conjunctiva/sclera: Conjunctivae normal.      Pupils: Pupils are equal, round, and reactive to light.   Cardiovascular:      Rate and Rhythm: Normal rate and regular rhythm.   Pulmonary:      Effort: Pulmonary effort is normal.      Breath sounds: Normal breath sounds.   Abdominal:      General: There is no distension.      Palpations: Abdomen is soft.      Tenderness: There is no abdominal tenderness.   Musculoskeletal:         General: No swelling. Normal range of motion.      Cervical back: Normal range of motion.      Right lower leg: No edema.      Left lower leg: No edema.   Skin:     General: Skin is warm.      Findings: No lesion or rash.   Neurological:      General: No focal deficit present.      Mental Status: He is alert and oriented to person, place, and time.      Motor: No weakness.   Psychiatric:         Mood and Affect: Mood normal.         Thought Content: Thought content normal.             ASSESSMENT     47 y.o. male with     1. Essential hypertension    2. Prediabetes    3. Subclinical hyperthyroidism    4. Insomnia, unspecified type    5. Depression, unspecified depression type          PLAN:     1. Essential hypertension  Overview:  losartan-hctz 100-12.5 daily  Stable on medications, continue regimen      Orders:  -     Comprehensive Metabolic Panel; Future; Expected date: 11/09/2022  -     CBC Auto Differential; Future; Expected date: 11/09/2022    2. Prediabetes  Overview:  hga1c normal on recent labs    Orders:  -     Hemoglobin A1C; Future; Expected date: 11/09/2022  -     Lipid Panel; Future; Expected date: 11/09/2022    3. Subclinical hyperthyroidism  Overview:  -Hx US thyroid 10/2021 which showed diffusely enlarged thyroid  Resolved on recent labs    Orders:  -     T4; Future; Expected date: 11/09/2022  -     TSH; Future; Expected date: 11/09/2022    4. Insomnia, unspecified type  Overview:  Discussed using Trazodone prn off label for insomnia. Patient understands plan.  Counseled on  sleep hygiene  Known increased risk for serotonin syndrome with trazodone while on wellbutrin. Discussed with patient who understands risks        Orders:  -     traZODone (DESYREL) 50 MG tablet; Take 1 tablet (50 mg total) by mouth every evening.  Dispense: 90 tablet; Refill: 3    5. Depression, unspecified depression type  Overview:  Under Wellbutrin 300 mg daily tolerating well  Previously on Fluoxetine    Assessment & Plan:  Stable on medications, continue regimen      Orders:  -     buPROPion (WELLBUTRIN XL) 300 MG 24 hr tablet; Take 1 tablet (300 mg total) by mouth once daily.  Dispense: 30 tablet; Refill: 11      Follow up in 3 months      No follow-ups on file.

## 2023-01-03 ENCOUNTER — PATIENT MESSAGE (OUTPATIENT)
Dept: ADMINISTRATIVE | Facility: HOSPITAL | Age: 48
End: 2023-01-03
Payer: COMMERCIAL

## 2023-01-31 ENCOUNTER — PATIENT MESSAGE (OUTPATIENT)
Dept: INTERNAL MEDICINE | Facility: CLINIC | Age: 48
End: 2023-01-31
Payer: COMMERCIAL

## 2023-01-31 ENCOUNTER — PATIENT MESSAGE (OUTPATIENT)
Dept: ADMINISTRATIVE | Facility: HOSPITAL | Age: 48
End: 2023-01-31
Payer: COMMERCIAL

## 2023-01-31 DIAGNOSIS — Z12.11 SCREENING FOR COLON CANCER: ICD-10-CM

## 2023-02-01 DIAGNOSIS — R73.03 PREDIABETES: Primary | ICD-10-CM

## 2023-02-01 RX ORDER — METFORMIN HYDROCHLORIDE 500 MG/1
500 TABLET ORAL 2 TIMES DAILY WITH MEALS
Qty: 180 TABLET | Refills: 3 | Status: SHIPPED | OUTPATIENT
Start: 2023-02-01 | End: 2023-05-04

## 2023-02-01 NOTE — TELEPHONE ENCOUNTER
The only other alternative he would qualify for is metformin daily (an oral pill).  I have sent this to his pharmacy.  Do not want him to take the Ozempic in the metformin at the same time if the Ozempic does become available again.

## 2023-03-02 ENCOUNTER — OFFICE VISIT (OUTPATIENT)
Dept: PRIMARY CARE CLINIC | Facility: CLINIC | Age: 48
End: 2023-03-02
Payer: COMMERCIAL

## 2023-03-02 ENCOUNTER — PATIENT MESSAGE (OUTPATIENT)
Dept: PRIMARY CARE CLINIC | Facility: CLINIC | Age: 48
End: 2023-03-02

## 2023-03-02 ENCOUNTER — LAB VISIT (OUTPATIENT)
Dept: LAB | Facility: HOSPITAL | Age: 48
End: 2023-03-02
Attending: STUDENT IN AN ORGANIZED HEALTH CARE EDUCATION/TRAINING PROGRAM
Payer: COMMERCIAL

## 2023-03-02 VITALS
HEIGHT: 74 IN | SYSTOLIC BLOOD PRESSURE: 135 MMHG | OXYGEN SATURATION: 97 % | WEIGHT: 257.94 LBS | BODY MASS INDEX: 33.1 KG/M2 | HEART RATE: 84 BPM | DIASTOLIC BLOOD PRESSURE: 88 MMHG

## 2023-03-02 DIAGNOSIS — I10 ESSENTIAL HYPERTENSION: ICD-10-CM

## 2023-03-02 DIAGNOSIS — E05.90 SUBCLINICAL HYPERTHYROIDISM: ICD-10-CM

## 2023-03-02 DIAGNOSIS — E06.3 HASHIMOTO'S THYROIDITIS: ICD-10-CM

## 2023-03-02 DIAGNOSIS — F32.9 REACTIVE DEPRESSION: ICD-10-CM

## 2023-03-02 DIAGNOSIS — K42.9 UMBILICAL HERNIA WITHOUT OBSTRUCTION AND WITHOUT GANGRENE: Primary | ICD-10-CM

## 2023-03-02 DIAGNOSIS — R73.03 PREDIABETES: ICD-10-CM

## 2023-03-02 LAB
ALBUMIN SERPL BCP-MCNC: 4.1 G/DL (ref 3.5–5.2)
ALP SERPL-CCNC: 56 U/L (ref 55–135)
ALT SERPL W/O P-5'-P-CCNC: 30 U/L (ref 10–44)
ANION GAP SERPL CALC-SCNC: 5 MMOL/L (ref 8–16)
AST SERPL-CCNC: 22 U/L (ref 10–40)
BASOPHILS # BLD AUTO: 0.07 K/UL (ref 0–0.2)
BASOPHILS NFR BLD: 1 % (ref 0–1.9)
BILIRUB SERPL-MCNC: 0.7 MG/DL (ref 0.1–1)
BUN SERPL-MCNC: 22 MG/DL (ref 6–20)
CALCIUM SERPL-MCNC: 10 MG/DL (ref 8.7–10.5)
CHLORIDE SERPL-SCNC: 109 MMOL/L (ref 95–110)
CHOLEST SERPL-MCNC: 142 MG/DL (ref 120–199)
CHOLEST/HDLC SERPL: 4.7 {RATIO} (ref 2–5)
CO2 SERPL-SCNC: 27 MMOL/L (ref 23–29)
CREAT SERPL-MCNC: 1.4 MG/DL (ref 0.5–1.4)
DIFFERENTIAL METHOD: ABNORMAL
EOSINOPHIL # BLD AUTO: 0.2 K/UL (ref 0–0.5)
EOSINOPHIL NFR BLD: 3.2 % (ref 0–8)
ERYTHROCYTE [DISTWIDTH] IN BLOOD BY AUTOMATED COUNT: 12.9 % (ref 11.5–14.5)
EST. GFR  (NO RACE VARIABLE): >60 ML/MIN/1.73 M^2
GLUCOSE SERPL-MCNC: 108 MG/DL (ref 70–110)
HCT VFR BLD AUTO: 46.5 % (ref 40–54)
HDLC SERPL-MCNC: 30 MG/DL (ref 40–75)
HDLC SERPL: 21.1 % (ref 20–50)
HGB BLD-MCNC: 15.3 G/DL (ref 14–18)
IMM GRANULOCYTES # BLD AUTO: 0.09 K/UL (ref 0–0.04)
IMM GRANULOCYTES NFR BLD AUTO: 1.2 % (ref 0–0.5)
LDLC SERPL CALC-MCNC: 101 MG/DL (ref 63–159)
LYMPHOCYTES # BLD AUTO: 2.3 K/UL (ref 1–4.8)
LYMPHOCYTES NFR BLD: 31.6 % (ref 18–48)
MCH RBC QN AUTO: 29.9 PG (ref 27–31)
MCHC RBC AUTO-ENTMCNC: 32.9 G/DL (ref 32–36)
MCV RBC AUTO: 91 FL (ref 82–98)
MONOCYTES # BLD AUTO: 0.7 K/UL (ref 0.3–1)
MONOCYTES NFR BLD: 9.6 % (ref 4–15)
NEUTROPHILS # BLD AUTO: 3.9 K/UL (ref 1.8–7.7)
NEUTROPHILS NFR BLD: 53.4 % (ref 38–73)
NONHDLC SERPL-MCNC: 112 MG/DL
NRBC BLD-RTO: 0 /100 WBC
PLATELET # BLD AUTO: 201 K/UL (ref 150–450)
PMV BLD AUTO: 10.3 FL (ref 9.2–12.9)
POTASSIUM SERPL-SCNC: 4.2 MMOL/L (ref 3.5–5.1)
PROT SERPL-MCNC: 7.4 G/DL (ref 6–8.4)
RBC # BLD AUTO: 5.11 M/UL (ref 4.6–6.2)
SODIUM SERPL-SCNC: 141 MMOL/L (ref 136–145)
T4 SERPL-MCNC: 9.5 UG/DL (ref 4.5–11.5)
TRIGL SERPL-MCNC: 55 MG/DL (ref 30–150)
TSH SERPL DL<=0.005 MIU/L-ACNC: 1.28 UIU/ML (ref 0.4–4)
WBC # BLD AUTO: 7.28 K/UL (ref 3.9–12.7)

## 2023-03-02 PROCEDURE — 3079F PR MOST RECENT DIASTOLIC BLOOD PRESSURE 80-89 MM HG: ICD-10-PCS | Mod: CPTII,S$GLB,, | Performed by: STUDENT IN AN ORGANIZED HEALTH CARE EDUCATION/TRAINING PROGRAM

## 2023-03-02 PROCEDURE — 3075F SYST BP GE 130 - 139MM HG: CPT | Mod: CPTII,S$GLB,, | Performed by: STUDENT IN AN ORGANIZED HEALTH CARE EDUCATION/TRAINING PROGRAM

## 2023-03-02 PROCEDURE — 85025 COMPLETE CBC W/AUTO DIFF WBC: CPT | Performed by: STUDENT IN AN ORGANIZED HEALTH CARE EDUCATION/TRAINING PROGRAM

## 2023-03-02 PROCEDURE — 36415 COLL VENOUS BLD VENIPUNCTURE: CPT | Performed by: STUDENT IN AN ORGANIZED HEALTH CARE EDUCATION/TRAINING PROGRAM

## 2023-03-02 PROCEDURE — 3075F PR MOST RECENT SYSTOLIC BLOOD PRESS GE 130-139MM HG: ICD-10-PCS | Mod: CPTII,S$GLB,, | Performed by: STUDENT IN AN ORGANIZED HEALTH CARE EDUCATION/TRAINING PROGRAM

## 2023-03-02 PROCEDURE — 1159F MED LIST DOCD IN RCRD: CPT | Mod: CPTII,S$GLB,, | Performed by: STUDENT IN AN ORGANIZED HEALTH CARE EDUCATION/TRAINING PROGRAM

## 2023-03-02 PROCEDURE — 99999 PR PBB SHADOW E&M-EST. PATIENT-LVL V: ICD-10-PCS | Mod: PBBFAC,,, | Performed by: STUDENT IN AN ORGANIZED HEALTH CARE EDUCATION/TRAINING PROGRAM

## 2023-03-02 PROCEDURE — 99214 PR OFFICE/OUTPT VISIT, EST, LEVL IV, 30-39 MIN: ICD-10-PCS | Mod: S$GLB,,, | Performed by: STUDENT IN AN ORGANIZED HEALTH CARE EDUCATION/TRAINING PROGRAM

## 2023-03-02 PROCEDURE — 80061 LIPID PANEL: CPT | Performed by: STUDENT IN AN ORGANIZED HEALTH CARE EDUCATION/TRAINING PROGRAM

## 2023-03-02 PROCEDURE — 84443 ASSAY THYROID STIM HORMONE: CPT | Performed by: STUDENT IN AN ORGANIZED HEALTH CARE EDUCATION/TRAINING PROGRAM

## 2023-03-02 PROCEDURE — 84436 ASSAY OF TOTAL THYROXINE: CPT | Performed by: STUDENT IN AN ORGANIZED HEALTH CARE EDUCATION/TRAINING PROGRAM

## 2023-03-02 PROCEDURE — 99214 OFFICE O/P EST MOD 30 MIN: CPT | Mod: S$GLB,,, | Performed by: STUDENT IN AN ORGANIZED HEALTH CARE EDUCATION/TRAINING PROGRAM

## 2023-03-02 PROCEDURE — 3008F PR BODY MASS INDEX (BMI) DOCUMENTED: ICD-10-PCS | Mod: CPTII,S$GLB,, | Performed by: STUDENT IN AN ORGANIZED HEALTH CARE EDUCATION/TRAINING PROGRAM

## 2023-03-02 PROCEDURE — 83036 HEMOGLOBIN GLYCOSYLATED A1C: CPT | Performed by: STUDENT IN AN ORGANIZED HEALTH CARE EDUCATION/TRAINING PROGRAM

## 2023-03-02 PROCEDURE — 1159F PR MEDICATION LIST DOCUMENTED IN MEDICAL RECORD: ICD-10-PCS | Mod: CPTII,S$GLB,, | Performed by: STUDENT IN AN ORGANIZED HEALTH CARE EDUCATION/TRAINING PROGRAM

## 2023-03-02 PROCEDURE — 3079F DIAST BP 80-89 MM HG: CPT | Mod: CPTII,S$GLB,, | Performed by: STUDENT IN AN ORGANIZED HEALTH CARE EDUCATION/TRAINING PROGRAM

## 2023-03-02 PROCEDURE — 1160F PR REVIEW ALL MEDS BY PRESCRIBER/CLIN PHARMACIST DOCUMENTED: ICD-10-PCS | Mod: CPTII,S$GLB,, | Performed by: STUDENT IN AN ORGANIZED HEALTH CARE EDUCATION/TRAINING PROGRAM

## 2023-03-02 PROCEDURE — 99999 PR PBB SHADOW E&M-EST. PATIENT-LVL V: CPT | Mod: PBBFAC,,, | Performed by: STUDENT IN AN ORGANIZED HEALTH CARE EDUCATION/TRAINING PROGRAM

## 2023-03-02 PROCEDURE — 3008F BODY MASS INDEX DOCD: CPT | Mod: CPTII,S$GLB,, | Performed by: STUDENT IN AN ORGANIZED HEALTH CARE EDUCATION/TRAINING PROGRAM

## 2023-03-02 PROCEDURE — 1160F RVW MEDS BY RX/DR IN RCRD: CPT | Mod: CPTII,S$GLB,, | Performed by: STUDENT IN AN ORGANIZED HEALTH CARE EDUCATION/TRAINING PROGRAM

## 2023-03-02 PROCEDURE — 80053 COMPREHEN METABOLIC PANEL: CPT | Performed by: STUDENT IN AN ORGANIZED HEALTH CARE EDUCATION/TRAINING PROGRAM

## 2023-03-02 NOTE — PROGRESS NOTES
He can. He should prescribe if he wants him to take it. SUBJECTIVE     Chief Complaint   Patient presents with    hernia    Depression       HPI  Jose Fox is a very pleasant 48 y.o. male with medical diagnoses as listed in the medical history and problem list that presents for hypertension and depression follow-up. Pt has been doing well since our last appointment.      HTN -   Currently prescribed losartan-hctz 100-12.5 daily  Patient endorses taking medication as directed.  Denies side effects or concerns while taking medication.  Patient not currently checking BP at home.  Denies headaches, vision changes, CP, palpitations, or other concerning symptoms.  Due for micro albumin creatinine ratio  Lab Results   Component Value Date    MICALBCREAT 5.8 09/19/2022     BP Readings from Last 3 Encounters:   03/02/23 135/88   11/09/22 128/80   09/19/22 132/84     Prediabetes:  Obesity:  -Ozempic 1 weekly  -recent hemoglobin A1c in normal range  -Restarted going to gym recently (4x weekly)    Subclinical hyperthyroidism:  -TSH within normal limits on lab performed 9/2022  -Hx US thyroid 10/2021 which showed diffusely enlarged thyroid  Due for repeat TSH    Depression:   Wellbutrin 300 mg daily.  Previously on fluoxetine 10 mg daily  Tolerating medication well.  Symptoms stable on current dose.  Denies HI/SI/AVH  Patient denies feeling hopeless, decreased pleasure in doing things, decreased energy, eating changes, or difficulty concentrating.    Hernia:  Has noticed swelling occasionally from belly button for the past few months.  Notices it protrudes when he is doing weights at the gym.  Area is reducible.  No pain.  No changes in bowel movements.        PAST MEDICAL HISTORY:  Past Medical History:   Diagnosis Date    Gout     Hypertension     Hypothyroidism     Thyroid disease        PAST SURGICAL HISTORY:  Past Surgical History:   Procedure Laterality Date    CARDIAC ELECTROPHYSIOLOGY STUDY AND ABLATION  2010    LEG SURGERY Left        SOCIAL HISTORY:  Social History      Socioeconomic History    Marital status:     Number of children: 3   Occupational History    Occupation: [employer]      Comment: [employer]   Tobacco Use    Smoking status: Never    Smokeless tobacco: Never   Substance and Sexual Activity    Alcohol use: Never    Drug use: Never    Sexual activity: Yes     Partners: Female   Social History Narrative    23.18    25      Social Determinants of Health     Financial Resource Strain: Low Risk     Difficulty of Paying Living Expenses: Not hard at all   Food Insecurity: No Food Insecurity    Worried About Running Out of Food in the Last Year: Never true    Ran Out of Food in the Last Year: Never true   Transportation Needs: No Transportation Needs    Lack of Transportation (Medical): No    Lack of Transportation (Non-Medical): No   Physical Activity: Sufficiently Active    Days of Exercise per Week: 3 days    Minutes of Exercise per Session: 80 min   Stress: Stress Concern Present    Feeling of Stress : To some extent   Social Connections: Unknown    Frequency of Communication with Friends and Family: More than three times a week    Frequency of Social Gatherings with Friends and Family: Never    Active Member of Clubs or Organizations: No    Attends Club or Organization Meetings: Patient refused    Marital Status:    Housing Stability: Low Risk     Unable to Pay for Housing in the Last Year: No    Number of Places Lived in the Last Year: 1    Unstable Housing in the Last Year: No       FAMILY HISTORY:  Family History   Problem Relation Age of Onset    Hypertension Mother     Hypertension Father     Diabetes Father        ALLERGIES AND MEDICATIONS: updated and reviewed.  Review of patient's allergies indicates:  No Known Allergies  Current Outpatient Medications   Medication Sig Dispense Refill    buPROPion (WELLBUTRIN XL) 300 MG 24 hr tablet Take 1 tablet (300 mg total) by mouth once daily. 30 tablet 11    diclofenac sodium (VOLTAREN) 1 % Gel Apply  "2 g topically 4 (four) times daily. 100 g 2    febuxostat (ULORIC) 40 mg Tab Take 1 tablet (40 mg total) by mouth once daily. 90 tablet 3    indomethacin (INDOCIN) 50 MG capsule Take 1 capsule (50 mg total) by mouth every meal as needed (For acute flare-up of gout). 90 capsule 3    losartan-hydrochlorothiazide 100-12.5 mg (HYZAAR) 100-12.5 mg Tab Take 1 tablet by mouth once daily. 90 tablet 3    semaglutide (OZEMPIC) 1 mg/dose (2 mg/1.5 mL) PnIj Inject 1 mg into the skin every 7 days. 2 pen 11    sumatriptan (IMITREX) 50 MG tablet TAKE 1 TABLET (50 MG TOTAL) BY MOUTH EVERY 4 (FOUR) HOURS AS NEEDED FOR MIGRAINE. NOT TO EXCEED 3 TABS PER DAY 12 tablet 1    allopurinoL (ZYLOPRIM) 100 MG tablet Take 100 mg by mouth once daily.      metFORMIN (GLUCOPHAGE) 500 MG tablet Take 1 tablet (500 mg total) by mouth 2 (two) times daily with meals. 180 tablet 3    traZODone (DESYREL) 50 MG tablet Take 1 tablet (50 mg total) by mouth every evening. 90 tablet 3     No current facility-administered medications for this visit.       ROS  Review of Systems   Constitutional:  Negative for fever and weight loss.   HENT:  Negative for hearing loss.    Eyes:  Negative for discharge.   Respiratory:  Negative for cough, shortness of breath and wheezing.    Cardiovascular:  Negative for chest pain and palpitations.   Gastrointestinal:  Negative for abdominal pain, constipation, diarrhea, nausea and vomiting.   Genitourinary:  Negative for dysuria and hematuria.   Musculoskeletal:  Negative for back pain, joint pain and neck pain.   Skin:  Negative for rash.   Neurological:  Negative for dizziness, weakness and headaches.   Psychiatric/Behavioral:  Positive for depression. The patient is not nervous/anxious.          OBJECTIVE     Physical Exam  Vitals:    03/02/23 0908   BP: 135/88   Pulse:     Body mass index is 33.12 kg/m².  Weight: 117 kg (257 lb 15 oz)   Height: 6' 2" (188 cm)     Physical Exam  HENT:      Head: Normocephalic and " atraumatic.      Nose: Nose normal.      Mouth/Throat:      Mouth: Mucous membranes are moist.      Pharynx: Oropharynx is clear.   Eyes:      Extraocular Movements: Extraocular movements intact.      Conjunctiva/sclera: Conjunctivae normal.      Pupils: Pupils are equal, round, and reactive to light.   Cardiovascular:      Rate and Rhythm: Normal rate and regular rhythm.   Pulmonary:      Effort: Pulmonary effort is normal.      Breath sounds: Normal breath sounds.   Abdominal:      General: There is no distension.      Palpations: Abdomen is soft.      Tenderness: There is no abdominal tenderness.      Hernia: A hernia (Umbilical, reducible) is present.   Musculoskeletal:         General: No swelling. Normal range of motion.      Cervical back: Normal range of motion.      Right lower leg: No edema.      Left lower leg: No edema.   Skin:     General: Skin is warm.      Findings: No lesion or rash.   Neurological:      General: No focal deficit present.      Mental Status: He is alert and oriented to person, place, and time.      Motor: No weakness.   Psychiatric:         Mood and Affect: Mood normal.         Thought Content: Thought content normal.             ASSESSMENT     48 y.o. male with     1. Umbilical hernia without obstruction and without gangrene    2. Essential hypertension    3. Hashimoto's thyroiditis    4. Prediabetes    5. Reactive depression            PLAN:     1. Umbilical hernia without obstruction and without gangrene  -     Ambulatory referral/consult to General Surgery; Future; Expected date: 03/09/2023  -      Soft Tissue Abdomen; Future; Expected date: 03/02/2023    2. Essential hypertension  Overview:  losartan-hctz 100-12.5 daily  At goal  Stable on medications, continue regimen        3. Hashimoto's thyroiditis  Overview:  Not currently on medication    Assessment & Plan:  TSH ordered follow-up      4. Prediabetes  Overview:  hga1c normal on recent labs.  Follow-up hemoglobin  A1c      5. Reactive depression  Overview:  Under Wellbutrin 300 mg daily tolerating well  Previously on Fluoxetine            Follow up in 6 months      Follow up in about 6 months (around 9/2/2023).

## 2023-03-03 ENCOUNTER — PATIENT MESSAGE (OUTPATIENT)
Dept: PRIMARY CARE CLINIC | Facility: CLINIC | Age: 48
End: 2023-03-03
Payer: COMMERCIAL

## 2023-03-03 LAB
ESTIMATED AVG GLUCOSE: 111 MG/DL (ref 68–131)
HBA1C MFR BLD: 5.5 % (ref 4–5.6)

## 2023-03-03 NOTE — TELEPHONE ENCOUNTER
Sure, he may take that.  I also would like for him to the plenty of fiber through vegetables, fruit, and whole grains.

## 2023-03-07 ENCOUNTER — PATIENT MESSAGE (OUTPATIENT)
Dept: PRIMARY CARE CLINIC | Facility: CLINIC | Age: 48
End: 2023-03-07
Payer: COMMERCIAL

## 2023-03-07 DIAGNOSIS — M1A.0710 CHRONIC IDIOPATHIC GOUT INVOLVING TOE OF RIGHT FOOT WITHOUT TOPHUS: ICD-10-CM

## 2023-03-07 DIAGNOSIS — M10.071 IDIOPATHIC GOUT INVOLVING TOE OF RIGHT FOOT, UNSPECIFIED CHRONICITY: ICD-10-CM

## 2023-03-07 DIAGNOSIS — I10 ESSENTIAL HYPERTENSION: ICD-10-CM

## 2023-03-08 RX ORDER — FEBUXOSTAT 40 MG/1
40 TABLET, FILM COATED ORAL DAILY
Qty: 90 TABLET | Refills: 3 | Status: SHIPPED | OUTPATIENT
Start: 2023-03-08 | End: 2023-11-10 | Stop reason: SDUPTHER

## 2023-03-08 RX ORDER — LOSARTAN POTASSIUM AND HYDROCHLOROTHIAZIDE 12.5; 1 MG/1; MG/1
1 TABLET ORAL DAILY
Qty: 90 TABLET | Refills: 3 | Status: SHIPPED | OUTPATIENT
Start: 2023-03-08 | End: 2023-11-10 | Stop reason: SDUPTHER

## 2023-03-08 NOTE — TELEPHONE ENCOUNTER
Please let him know prescriptions sent to updated pharmacy, [facility].  Thanks!

## 2023-03-14 ENCOUNTER — HOSPITAL ENCOUNTER (OUTPATIENT)
Dept: RADIOLOGY | Facility: HOSPITAL | Age: 48
Discharge: HOME | End: 2023-03-14
Attending: STUDENT IN AN ORGANIZED HEALTH CARE EDUCATION/TRAINING PROGRAM
Payer: COMMERCIAL

## 2023-03-14 DIAGNOSIS — K42.9 UMBILICAL HERNIA WITHOUT OBSTRUCTION AND WITHOUT GANGRENE: ICD-10-CM

## 2023-03-14 PROCEDURE — 76705 ECHO EXAM OF ABDOMEN: CPT | Mod: TC

## 2023-03-14 PROCEDURE — 76705 ECHO EXAM OF ABDOMEN: CPT | Mod: 26,,, | Performed by: STUDENT IN AN ORGANIZED HEALTH CARE EDUCATION/TRAINING PROGRAM

## 2023-03-14 PROCEDURE — 76705 US SOFT TISSUE, ABDOMEN: ICD-10-PCS | Mod: 26,,, | Performed by: STUDENT IN AN ORGANIZED HEALTH CARE EDUCATION/TRAINING PROGRAM

## 2023-04-05 ENCOUNTER — TELEPHONE (OUTPATIENT)
Dept: SURGERY | Facility: CLINIC | Age: 48
End: 2023-04-05
Payer: COMMERCIAL

## 2023-04-05 NOTE — TELEPHONE ENCOUNTER
4/5/23  1429  Attempted to contact patient to reschedule appointment. No answer. Message left via BPT.       ----- Message from Renay Castro sent at 4/5/2023  7:44 AM CDT -----  Type:  Needs Medical Advice    Who Called: pt  Symptoms (please be specific): pt is requesting to be rescheduled to be seen for  Umbilical hernia without obstruction and without gangrene     Would the patient rather a call back or a response via [facility]? call  Best Call Back Number: [phone]  Additional Information:

## 2023-04-10 ENCOUNTER — PATIENT MESSAGE (OUTPATIENT)
Dept: PRIMARY CARE CLINIC | Facility: CLINIC | Age: 48
End: 2023-04-10
Payer: COMMERCIAL

## 2023-04-10 DIAGNOSIS — F32.A DEPRESSION, UNSPECIFIED DEPRESSION TYPE: Primary | ICD-10-CM

## 2023-04-10 NOTE — TELEPHONE ENCOUNTER
Please let patient know I have placed a referral for Psychology to start talk therapy.    Psychiatry Department [phone].

## 2023-04-19 ENCOUNTER — TELEPHONE (OUTPATIENT)
Dept: PREADMISSION TESTING | Facility: HOSPITAL | Age: 48
End: 2023-04-19
Payer: COMMERCIAL

## 2023-04-19 ENCOUNTER — OFFICE VISIT (OUTPATIENT)
Dept: SURGERY | Facility: CLINIC | Age: 48
End: 2023-04-19
Payer: COMMERCIAL

## 2023-04-19 VITALS — HEIGHT: 74 IN | BODY MASS INDEX: 32.9 KG/M2 | WEIGHT: 256.38 LBS

## 2023-04-19 DIAGNOSIS — I10 HYPERTENSION, UNSPECIFIED TYPE: ICD-10-CM

## 2023-04-19 DIAGNOSIS — Z01.818 PRE-OP EVALUATION: Primary | ICD-10-CM

## 2023-04-19 DIAGNOSIS — K43.9 VENTRAL HERNIA WITHOUT OBSTRUCTION OR GANGRENE: Primary | ICD-10-CM

## 2023-04-19 PROCEDURE — 1159F PR MEDICATION LIST DOCUMENTED IN MEDICAL RECORD: ICD-10-PCS | Mod: CPTII,S$GLB,, | Performed by: STUDENT IN AN ORGANIZED HEALTH CARE EDUCATION/TRAINING PROGRAM

## 2023-04-19 PROCEDURE — 99204 PR OFFICE/OUTPT VISIT, NEW, LEVL IV, 45-59 MIN: ICD-10-PCS | Mod: S$GLB,,, | Performed by: STUDENT IN AN ORGANIZED HEALTH CARE EDUCATION/TRAINING PROGRAM

## 2023-04-19 PROCEDURE — 1160F RVW MEDS BY RX/DR IN RCRD: CPT | Mod: CPTII,S$GLB,, | Performed by: STUDENT IN AN ORGANIZED HEALTH CARE EDUCATION/TRAINING PROGRAM

## 2023-04-19 PROCEDURE — 1160F PR REVIEW ALL MEDS BY PRESCRIBER/CLIN PHARMACIST DOCUMENTED: ICD-10-PCS | Mod: CPTII,S$GLB,, | Performed by: STUDENT IN AN ORGANIZED HEALTH CARE EDUCATION/TRAINING PROGRAM

## 2023-04-19 PROCEDURE — 3044F HG A1C LEVEL LT 7.0%: CPT | Mod: CPTII,S$GLB,, | Performed by: STUDENT IN AN ORGANIZED HEALTH CARE EDUCATION/TRAINING PROGRAM

## 2023-04-19 PROCEDURE — 3044F PR MOST RECENT HEMOGLOBIN A1C LEVEL <7.0%: ICD-10-PCS | Mod: CPTII,S$GLB,, | Performed by: STUDENT IN AN ORGANIZED HEALTH CARE EDUCATION/TRAINING PROGRAM

## 2023-04-19 PROCEDURE — 1159F MED LIST DOCD IN RCRD: CPT | Mod: CPTII,S$GLB,, | Performed by: STUDENT IN AN ORGANIZED HEALTH CARE EDUCATION/TRAINING PROGRAM

## 2023-04-19 PROCEDURE — 99999 PR PBB SHADOW E&M-EST. PATIENT-LVL IV: CPT | Mod: PBBFAC,,, | Performed by: STUDENT IN AN ORGANIZED HEALTH CARE EDUCATION/TRAINING PROGRAM

## 2023-04-19 PROCEDURE — 3008F PR BODY MASS INDEX (BMI) DOCUMENTED: ICD-10-PCS | Mod: CPTII,S$GLB,, | Performed by: STUDENT IN AN ORGANIZED HEALTH CARE EDUCATION/TRAINING PROGRAM

## 2023-04-19 PROCEDURE — 99999 PR PBB SHADOW E&M-EST. PATIENT-LVL IV: ICD-10-PCS | Mod: PBBFAC,,, | Performed by: STUDENT IN AN ORGANIZED HEALTH CARE EDUCATION/TRAINING PROGRAM

## 2023-04-19 PROCEDURE — 99204 OFFICE O/P NEW MOD 45 MIN: CPT | Mod: S$GLB,,, | Performed by: STUDENT IN AN ORGANIZED HEALTH CARE EDUCATION/TRAINING PROGRAM

## 2023-04-19 PROCEDURE — 3008F BODY MASS INDEX DOCD: CPT | Mod: CPTII,S$GLB,, | Performed by: STUDENT IN AN ORGANIZED HEALTH CARE EDUCATION/TRAINING PROGRAM

## 2023-04-19 RX ORDER — CEFAZOLIN SODIUM 2 G/50ML
2 SOLUTION INTRAVENOUS
Status: CANCELLED | OUTPATIENT
Start: 2023-04-19

## 2023-04-19 NOTE — PROGRESS NOTES
Patient ID: Jose Fox is a 48 y.o. male.    Chief Complaint: No chief complaint on file.      HPI:  HPI  48M with umbilical bulge for several years since 2019. Occasional pain, worse with lifting, works out in the gym. Works at [employer] but does not have to do a lot of lifting at work. Denies obstructive symptoms. No abdominal surgery.     Review of Systems   Constitutional:  Negative for chills, diaphoresis and fever.   HENT:  Negative for trouble swallowing.    Respiratory:  Negative for cough, shortness of breath, wheezing and stridor.    Cardiovascular:  Negative for chest pain and palpitations.   Gastrointestinal:  Positive for abdominal pain. Negative for abdominal distention, blood in stool, diarrhea, nausea and vomiting.   Endocrine: Negative for cold intolerance and heat intolerance.   Genitourinary:  Negative for difficulty urinating.   Musculoskeletal:  Negative for back pain.   Skin:  Negative for rash.   Allergic/Immunologic: Negative for immunocompromised state.   Neurological:  Negative for dizziness, syncope and numbness.   Hematological:  Negative for adenopathy.   Psychiatric/Behavioral:  Negative for agitation.      Current Outpatient Medications   Medication Sig Dispense Refill    allopurinoL (ZYLOPRIM) 100 MG tablet Take 100 mg by mouth once daily.      buPROPion (WELLBUTRIN XL) 300 MG 24 hr tablet Take 1 tablet (300 mg total) by mouth once daily. 30 tablet 11    diclofenac sodium (VOLTAREN) 1 % Gel Apply 2 g topically 4 (four) times daily. 100 g 2    febuxostat (ULORIC) 40 mg Tab Take 1 tablet (40 mg total) by mouth once daily. 90 tablet 3    indomethacin (INDOCIN) 50 MG capsule Take 1 capsule (50 mg total) by mouth every meal as needed (For acute flare-up of gout). 90 capsule 3    losartan-hydrochlorothiazide 100-12.5 mg (HYZAAR) 100-12.5 mg Tab Take 1 tablet by mouth once daily. 90 tablet 3    semaglutide (OZEMPIC) 1 mg/dose (2 mg/1.5 mL) PnIj Inject 1 mg into the skin every 7  days. 4 each 11    sumatriptan (IMITREX) 50 MG tablet TAKE 1 TABLET (50 MG TOTAL) BY MOUTH EVERY 4 (FOUR) HOURS AS NEEDED FOR MIGRAINE. NOT TO EXCEED 3 TABS PER DAY 12 tablet 1    metFORMIN (GLUCOPHAGE) 500 MG tablet Take 1 tablet (500 mg total) by mouth 2 (two) times daily with meals. 180 tablet 3    traZODone (DESYREL) 50 MG tablet Take 1 tablet (50 mg total) by mouth every evening. 90 tablet 3     No current facility-administered medications for this visit.       Review of patient's allergies indicates:  No Known Allergies    Past Medical History:   Diagnosis Date    Gout     Hypertension     Hypothyroidism     Thyroid disease        Past Surgical History:   Procedure Laterality Date    CARDIAC ELECTROPHYSIOLOGY STUDY AND ABLATION  2010    LEG SURGERY Left        Family History   Problem Relation Age of Onset    Hypertension Mother     Hypertension Father     Diabetes Father        Social History     Socioeconomic History    Marital status:     Number of children: 3   Occupational History    Occupation: [employer]      Comment: [employer]   Tobacco Use    Smoking status: Never    Smokeless tobacco: Never   Substance and Sexual Activity    Alcohol use: Never    Drug use: Never    Sexual activity: Yes     Partners: Female   Social History Narrative    23.18    25      Social Determinants of Health     Financial Resource Strain: Low Risk     Difficulty of Paying Living Expenses: Not hard at all   Food Insecurity: No Food Insecurity    Worried About Running Out of Food in the Last Year: Never true    Ran Out of Food in the Last Year: Never true   Transportation Needs: No Transportation Needs    Lack of Transportation (Medical): No    Lack of Transportation (Non-Medical): No   Physical Activity: Sufficiently Active    Days of Exercise per Week: 3 days    Minutes of Exercise per Session: 80 min   Stress: Stress Concern Present    Feeling of Stress : To some extent   Social Connections: Unknown    Frequency  of Communication with Friends and Family: More than three times a week    Frequency of Social Gatherings with Friends and Family: Never    Active Member of Clubs or Organizations: No    Attends Club or Organization Meetings: Patient refused    Marital Status:    Housing Stability: Low Risk     Unable to Pay for Housing in the Last Year: No    Number of Places Lived in the Last Year: 1    Unstable Housing in the Last Year: No       There were no vitals filed for this visit.    Physical Exam  Constitutional:       General: He is not in acute distress.  HENT:      Head: Normocephalic and atraumatic.   Eyes:      General: No scleral icterus.  Cardiovascular:      Rate and Rhythm: Normal rate.   Pulmonary:      Effort: Pulmonary effort is normal. No respiratory distress.      Breath sounds: No stridor.   Abdominal:      Palpations: Abdomen is soft.      Tenderness: There is no abdominal tenderness.      Hernia: A hernia is present.          Comments: Hernia just above umbilicus, not reducible, minimal TTP   Lymphadenopathy:      Cervical: No cervical adenopathy.   Skin:     General: Skin is warm.      Findings: No erythema.   Neurological:      Mental Status: He is alert and oriented to person, place, and time.   Psychiatric:         Behavior: Behavior normal.   Body mass index is 32.92 kg/m².  US does not show hernia but sounds like they were evaluating the diastasis  Hga1c 5.5    Assessment & Plan:  48M with incarcerated ventral hernia at umbilicus  Discussed different between diastasis and hernia  Plan for robot VHR may 11

## 2023-04-19 NOTE — H&P (VIEW-ONLY)
Patient ID: Jose Fox is a 48 y.o. male.    Chief Complaint: No chief complaint on file.      HPI:  HPI  48M with umbilical bulge for several years since 2019. Occasional pain, worse with lifting, works out in the gym. Works at [employer] but does not have to do a lot of lifting at work. Denies obstructive symptoms. No abdominal surgery.     Review of Systems   Constitutional:  Negative for chills, diaphoresis and fever.   HENT:  Negative for trouble swallowing.    Respiratory:  Negative for cough, shortness of breath, wheezing and stridor.    Cardiovascular:  Negative for chest pain and palpitations.   Gastrointestinal:  Positive for abdominal pain. Negative for abdominal distention, blood in stool, diarrhea, nausea and vomiting.   Endocrine: Negative for cold intolerance and heat intolerance.   Genitourinary:  Negative for difficulty urinating.   Musculoskeletal:  Negative for back pain.   Skin:  Negative for rash.   Allergic/Immunologic: Negative for immunocompromised state.   Neurological:  Negative for dizziness, syncope and numbness.   Hematological:  Negative for adenopathy.   Psychiatric/Behavioral:  Negative for agitation.      Current Outpatient Medications   Medication Sig Dispense Refill    allopurinoL (ZYLOPRIM) 100 MG tablet Take 100 mg by mouth once daily.      buPROPion (WELLBUTRIN XL) 300 MG 24 hr tablet Take 1 tablet (300 mg total) by mouth once daily. 30 tablet 11    diclofenac sodium (VOLTAREN) 1 % Gel Apply 2 g topically 4 (four) times daily. 100 g 2    febuxostat (ULORIC) 40 mg Tab Take 1 tablet (40 mg total) by mouth once daily. 90 tablet 3    indomethacin (INDOCIN) 50 MG capsule Take 1 capsule (50 mg total) by mouth every meal as needed (For acute flare-up of gout). 90 capsule 3    losartan-hydrochlorothiazide 100-12.5 mg (HYZAAR) 100-12.5 mg Tab Take 1 tablet by mouth once daily. 90 tablet 3    semaglutide (OZEMPIC) 1 mg/dose (2 mg/1.5 mL) PnIj Inject 1 mg into the skin every 7  days. 4 each 11    sumatriptan (IMITREX) 50 MG tablet TAKE 1 TABLET (50 MG TOTAL) BY MOUTH EVERY 4 (FOUR) HOURS AS NEEDED FOR MIGRAINE. NOT TO EXCEED 3 TABS PER DAY 12 tablet 1    metFORMIN (GLUCOPHAGE) 500 MG tablet Take 1 tablet (500 mg total) by mouth 2 (two) times daily with meals. 180 tablet 3    traZODone (DESYREL) 50 MG tablet Take 1 tablet (50 mg total) by mouth every evening. 90 tablet 3     No current facility-administered medications for this visit.       Review of patient's allergies indicates:  No Known Allergies    Past Medical History:   Diagnosis Date    Gout     Hypertension     Hypothyroidism     Thyroid disease        Past Surgical History:   Procedure Laterality Date    CARDIAC ELECTROPHYSIOLOGY STUDY AND ABLATION  2010    LEG SURGERY Left        Family History   Problem Relation Age of Onset    Hypertension Mother     Hypertension Father     Diabetes Father        Social History     Socioeconomic History    Marital status:     Number of children: 3   Occupational History    Occupation: [employer]      Comment: [employer]   Tobacco Use    Smoking status: Never    Smokeless tobacco: Never   Substance and Sexual Activity    Alcohol use: Never    Drug use: Never    Sexual activity: Yes     Partners: Female   Social History Narrative    23.18    25      Social Determinants of Health     Financial Resource Strain: Low Risk     Difficulty of Paying Living Expenses: Not hard at all   Food Insecurity: No Food Insecurity    Worried About Running Out of Food in the Last Year: Never true    Ran Out of Food in the Last Year: Never true   Transportation Needs: No Transportation Needs    Lack of Transportation (Medical): No    Lack of Transportation (Non-Medical): No   Physical Activity: Sufficiently Active    Days of Exercise per Week: 3 days    Minutes of Exercise per Session: 80 min   Stress: Stress Concern Present    Feeling of Stress : To some extent   Social Connections: Unknown    Frequency  of Communication with Friends and Family: More than three times a week    Frequency of Social Gatherings with Friends and Family: Never    Active Member of Clubs or Organizations: No    Attends Club or Organization Meetings: Patient refused    Marital Status:    Housing Stability: Low Risk     Unable to Pay for Housing in the Last Year: No    Number of Places Lived in the Last Year: 1    Unstable Housing in the Last Year: No       There were no vitals filed for this visit.    Physical Exam  Constitutional:       General: He is not in acute distress.  HENT:      Head: Normocephalic and atraumatic.   Eyes:      General: No scleral icterus.  Cardiovascular:      Rate and Rhythm: Normal rate.   Pulmonary:      Effort: Pulmonary effort is normal. No respiratory distress.      Breath sounds: No stridor.   Abdominal:      Palpations: Abdomen is soft.      Tenderness: There is no abdominal tenderness.      Hernia: A hernia is present.          Comments: Hernia just above umbilicus, not reducible, minimal TTP   Lymphadenopathy:      Cervical: No cervical adenopathy.   Skin:     General: Skin is warm.      Findings: No erythema.   Neurological:      Mental Status: He is alert and oriented to person, place, and time.   Psychiatric:         Behavior: Behavior normal.   Body mass index is 32.92 kg/m².  US does not show hernia but sounds like they were evaluating the diastasis  Hga1c 5.5    Assessment & Plan:  48M with incarcerated ventral hernia at umbilicus  Discussed different between diastasis and hernia  Plan for robot VHR may 11

## 2023-04-21 ENCOUNTER — PATIENT MESSAGE (OUTPATIENT)
Dept: ADMINISTRATIVE | Facility: HOSPITAL | Age: 48
End: 2023-04-21
Payer: COMMERCIAL

## 2023-04-27 ENCOUNTER — CLINICAL SUPPORT (OUTPATIENT)
Dept: LAB | Facility: HOSPITAL | Age: 48
End: 2023-04-27
Payer: COMMERCIAL

## 2023-04-27 DIAGNOSIS — Z01.818 PRE-OP EVALUATION: ICD-10-CM

## 2023-04-27 DIAGNOSIS — I10 HYPERTENSION, UNSPECIFIED TYPE: ICD-10-CM

## 2023-04-27 PROCEDURE — 93010 ELECTROCARDIOGRAM REPORT: CPT | Mod: ,,, | Performed by: INTERNAL MEDICINE

## 2023-04-27 PROCEDURE — 93005 ELECTROCARDIOGRAM TRACING: CPT

## 2023-04-27 PROCEDURE — 93010 EKG 12-LEAD: ICD-10-PCS | Mod: ,,, | Performed by: INTERNAL MEDICINE

## 2023-05-04 ENCOUNTER — ANESTHESIA EVENT (OUTPATIENT)
Dept: SURGERY | Facility: HOSPITAL | Age: 48
End: 2023-05-04
Payer: COMMERCIAL

## 2023-05-04 ENCOUNTER — HOSPITAL ENCOUNTER (OUTPATIENT)
Dept: PREADMISSION TESTING | Facility: HOSPITAL | Age: 48
Discharge: HOME | End: 2023-05-04
Attending: STUDENT IN AN ORGANIZED HEALTH CARE EDUCATION/TRAINING PROGRAM
Payer: COMMERCIAL

## 2023-05-04 DIAGNOSIS — R94.31 ABNORMAL FINDING ON EKG: ICD-10-CM

## 2023-05-04 DIAGNOSIS — I10 ESSENTIAL HYPERTENSION: Primary | ICD-10-CM

## 2023-05-04 RX ORDER — SCOLOPAMINE TRANSDERMAL SYSTEM 1 MG/1
1 PATCH, EXTENDED RELEASE TRANSDERMAL
Status: CANCELLED | OUTPATIENT
Start: 2023-05-04

## 2023-05-04 RX ORDER — SODIUM CHLORIDE, SODIUM LACTATE, POTASSIUM CHLORIDE, CALCIUM CHLORIDE 600; 310; 30; 20 MG/100ML; MG/100ML; MG/100ML; MG/100ML
INJECTION, SOLUTION INTRAVENOUS CONTINUOUS
Status: CANCELLED | OUTPATIENT
Start: 2023-05-04

## 2023-05-04 RX ORDER — LIDOCAINE HYDROCHLORIDE 10 MG/ML
1 INJECTION, SOLUTION EPIDURAL; INFILTRATION; INTRACAUDAL; PERINEURAL ONCE
Status: CANCELLED | OUTPATIENT
Start: 2023-05-04 | End: 2023-05-04

## 2023-05-04 NOTE — ANESTHESIA PREPROCEDURE EVALUATION
05/04/2023  Jose Fox is a 48 y.o., male scheduled for ROBOTIC REPAIR, HERNIA, VENTRAL (Abdomen) on 5/11/23.    Past Medical History:   Diagnosis Date    Gout     Hypertension     Hypothyroidism     Thyroid disease      Past Surgical History:   Procedure Laterality Date    CARDIAC ELECTROPHYSIOLOGY STUDY AND ABLATION  2010    LEG SURGERY Left      Current Outpatient Medications   Medication Instructions    allopurinoL (ZYLOPRIM) 100 mg, Oral, Daily    buPROPion (WELLBUTRIN XL) 300 mg, Oral, Daily    diclofenac sodium (VOLTAREN) 2 g, Topical (Top), 4 times daily    febuxostat (ULORIC) 40 mg, Oral, Daily    indomethacin (INDOCIN) 50 mg, Oral, 3 times daily with meals PRN    losartan-hydrochlorothiazide 100-12.5 mg (HYZAAR) 100-12.5 mg Tab 1 tablet, Oral, Daily    metFORMIN (GLUCOPHAGE) 500 mg, Oral, 2 times daily with meals    semaglutide (OZEMPIC) 1 mg, Subcutaneous, Every 7 days    sumatriptan (IMITREX) 50 mg, Oral, Every 4 hours PRN, Not to exceed 3 tabs per day    traZODone (DESYREL) 50 mg, Oral, Nightly       Pre-op Assessment    I have reviewed the Patient Summary Reports.     I have reviewed the Nursing Notes. I have reviewed the NPO Status.   I have reviewed the Medications.     Review of Systems  Anesthesia Hx:  Personal Hx of Anesthesia complications, Post-Operative Nausea/Vomiting, in the past, but not with recent anesthetics / prophylaxis   Social:  Non-Smoker, No Alcohol Use    Hematology/Oncology:  Hematology Normal        Cardiovascular:   Exercise tolerance: good Denies Pacemaker. Hypertension Past MI (2016)    Denies Angina.    Pulmonary:  Pulmonary Normal  Denies Shortness of breath.  Denies Sleep Apnea.    Renal/:  Renal/ Normal     Hepatic/GI:  Hepatic/GI Normal    Musculoskeletal:  Musculoskeletal Normal    Neurological:   Denies TIA. Denies CVA. Neuromuscular  Disease,  Headaches Denies Seizures.    Endocrine:   Denies Diabetes. Hyperthyroidism  Obesity / BMI > 30      Physical Exam  General: Oriented    Airway:  Mallampati: II   Mouth Opening: Normal  Neck ROM: Normal ROM    Dental:  Intact      Lab Results   Component Value Date    WBC 7.28 03/02/2023    HGB 15.3 03/02/2023    HCT 46.5 03/02/2023     03/02/2023    CHOL 142 03/02/2023    TRIG 55 03/02/2023    HDL 30 (L) 03/02/2023    ALT 30 03/02/2023    AST 22 03/02/2023     03/02/2023    K 4.2 03/02/2023     03/02/2023    CREATININE 1.4 03/02/2023    BUN 22 (H) 03/02/2023    CO2 27 03/02/2023    TSH 1.278 03/02/2023    HGBA1C 5.5 03/02/2023     Results for orders placed or performed in visit on 04/27/23   EKG 12-lead    Collection Time: 04/27/23  8:17 AM    Narrative    Test Reason : Z01.818,I10,    Vent. Rate : 078 BPM     Atrial Rate : 078 BPM     P-R Int : 152 ms          QRS Dur : 104 ms      QT Int : 396 ms       P-R-T Axes : 032 -68 126 degrees     QTc Int : 451 ms    Normal sinus rhythm  Left anterior fascicular block  Possible Lateral infarct ,age undetermined  Nonspecific T wave abnormality  Abnormal ECG  No previous ECGs available  Confirmed by Tyler Mejia MD ([id number]) on 4/27/2023 6:36:17 PM    Referred By: JACQUI CARRENO           Confirmed By:Tyler Mejia MD         Anesthesia Plan  Type of Anesthesia, risks & benefits discussed:    Anesthesia Type: Gen ETT  Intra-op Monitoring Plan: Standard ASA Monitors  Post Op Pain Control Plan: multimodal analgesia  Induction:  IV  ASA Score: 2  Day of Surgery Review of History & Physical: H&P Update referred to the surgeon/provider.  Anesthesia Plan Notes: Anesthesia consent to be signed prior to surgery 5/11/23      Ready For Surgery From Anesthesia Perspective.     .

## 2023-05-09 DIAGNOSIS — I10 ESSENTIAL HYPERTENSION: Primary | ICD-10-CM

## 2023-05-09 DIAGNOSIS — R94.31 ABNORMAL FINDING ON EKG: ICD-10-CM

## 2023-05-11 ENCOUNTER — HOSPITAL ENCOUNTER (OUTPATIENT)
Facility: HOSPITAL | Age: 48
Discharge: HOME | End: 2023-05-11
Attending: STUDENT IN AN ORGANIZED HEALTH CARE EDUCATION/TRAINING PROGRAM | Admitting: STUDENT IN AN ORGANIZED HEALTH CARE EDUCATION/TRAINING PROGRAM
Payer: COMMERCIAL

## 2023-05-11 ENCOUNTER — ANESTHESIA (OUTPATIENT)
Dept: SURGERY | Facility: HOSPITAL | Age: 48
End: 2023-05-11
Payer: COMMERCIAL

## 2023-05-11 VITALS
DIASTOLIC BLOOD PRESSURE: 77 MMHG | TEMPERATURE: 98 F | RESPIRATION RATE: 17 BRPM | WEIGHT: 252 LBS | OXYGEN SATURATION: 98 % | BODY MASS INDEX: 32.34 KG/M2 | HEIGHT: 74 IN | SYSTOLIC BLOOD PRESSURE: 139 MMHG | HEART RATE: 97 BPM

## 2023-05-11 DIAGNOSIS — K43.9 VENTRAL HERNIA WITHOUT OBSTRUCTION OR GANGRENE: Primary | ICD-10-CM

## 2023-05-11 PROCEDURE — 71000015 HC POSTOP RECOV 1ST HR: Performed by: STUDENT IN AN ORGANIZED HEALTH CARE EDUCATION/TRAINING PROGRAM

## 2023-05-11 PROCEDURE — 63600175 PHARM REV CODE 636 W HCPCS: Performed by: ANESTHESIOLOGY

## 2023-05-11 PROCEDURE — 36000711: Performed by: STUDENT IN AN ORGANIZED HEALTH CARE EDUCATION/TRAINING PROGRAM

## 2023-05-11 PROCEDURE — 27201423 OPTIME MED/SURG SUP & DEVICES STERILE SUPPLY: Performed by: STUDENT IN AN ORGANIZED HEALTH CARE EDUCATION/TRAINING PROGRAM

## 2023-05-11 PROCEDURE — D9220A PRA ANESTHESIA: ICD-10-PCS | Mod: CRNA,,, | Performed by: NURSE ANESTHETIST, CERTIFIED REGISTERED

## 2023-05-11 PROCEDURE — 63600175 PHARM REV CODE 636 W HCPCS: Performed by: NURSE ANESTHETIST, CERTIFIED REGISTERED

## 2023-05-11 PROCEDURE — D9220A PRA ANESTHESIA: ICD-10-PCS | Mod: ANES,,, | Performed by: ANESTHESIOLOGY

## 2023-05-11 PROCEDURE — 49594 RPR AA HRN 1ST 3-10 NCR/STRN: CPT | Mod: ,,, | Performed by: STUDENT IN AN ORGANIZED HEALTH CARE EDUCATION/TRAINING PROGRAM

## 2023-05-11 PROCEDURE — 25000003 PHARM REV CODE 250: Performed by: NURSE ANESTHETIST, CERTIFIED REGISTERED

## 2023-05-11 PROCEDURE — 37000009 HC ANESTHESIA EA ADD 15 MINS: Performed by: STUDENT IN AN ORGANIZED HEALTH CARE EDUCATION/TRAINING PROGRAM

## 2023-05-11 PROCEDURE — 71000033 HC RECOVERY, INTIAL HOUR: Performed by: STUDENT IN AN ORGANIZED HEALTH CARE EDUCATION/TRAINING PROGRAM

## 2023-05-11 PROCEDURE — 36000710: Performed by: STUDENT IN AN ORGANIZED HEALTH CARE EDUCATION/TRAINING PROGRAM

## 2023-05-11 PROCEDURE — C1781 MESH (IMPLANTABLE): HCPCS | Performed by: STUDENT IN AN ORGANIZED HEALTH CARE EDUCATION/TRAINING PROGRAM

## 2023-05-11 PROCEDURE — 37000008 HC ANESTHESIA 1ST 15 MINUTES: Performed by: STUDENT IN AN ORGANIZED HEALTH CARE EDUCATION/TRAINING PROGRAM

## 2023-05-11 PROCEDURE — 63600175 PHARM REV CODE 636 W HCPCS: Performed by: STUDENT IN AN ORGANIZED HEALTH CARE EDUCATION/TRAINING PROGRAM

## 2023-05-11 PROCEDURE — 25000003 PHARM REV CODE 250: Performed by: NURSE PRACTITIONER

## 2023-05-11 PROCEDURE — 71000016 HC POSTOP RECOV ADDL HR: Performed by: STUDENT IN AN ORGANIZED HEALTH CARE EDUCATION/TRAINING PROGRAM

## 2023-05-11 PROCEDURE — 71000039 HC RECOVERY, EACH ADD'L HOUR: Performed by: STUDENT IN AN ORGANIZED HEALTH CARE EDUCATION/TRAINING PROGRAM

## 2023-05-11 PROCEDURE — D9220A PRA ANESTHESIA: Mod: ANES,,, | Performed by: ANESTHESIOLOGY

## 2023-05-11 PROCEDURE — 25000003 PHARM REV CODE 250: Performed by: STUDENT IN AN ORGANIZED HEALTH CARE EDUCATION/TRAINING PROGRAM

## 2023-05-11 PROCEDURE — D9220A PRA ANESTHESIA: Mod: CRNA,,, | Performed by: NURSE ANESTHETIST, CERTIFIED REGISTERED

## 2023-05-11 PROCEDURE — 49594 PR REPAIR ANT ABD HERNIA INITIAL 3-10 CM INCARC/STRANG: ICD-10-PCS | Mod: ,,, | Performed by: STUDENT IN AN ORGANIZED HEALTH CARE EDUCATION/TRAINING PROGRAM

## 2023-05-11 PROCEDURE — 63600175 PHARM REV CODE 636 W HCPCS: Performed by: NURSE PRACTITIONER

## 2023-05-11 PROCEDURE — C9290 INJ, BUPIVACAINE LIPOSOME: HCPCS | Performed by: STUDENT IN AN ORGANIZED HEALTH CARE EDUCATION/TRAINING PROGRAM

## 2023-05-11 DEVICE — MESH SYMBOTEX HERN RND 12CM: Type: IMPLANTABLE DEVICE | Site: ABDOMEN | Status: FUNCTIONAL

## 2023-05-11 RX ORDER — OXYCODONE AND ACETAMINOPHEN 5; 325 MG/1; MG/1
1 TABLET ORAL
Status: DISCONTINUED | OUTPATIENT
Start: 2023-05-11 | End: 2023-05-11 | Stop reason: HOSPADM

## 2023-05-11 RX ORDER — ONDANSETRON 2 MG/ML
INJECTION INTRAMUSCULAR; INTRAVENOUS
Status: DISCONTINUED | OUTPATIENT
Start: 2023-05-11 | End: 2023-05-11

## 2023-05-11 RX ORDER — HYDROCODONE BITARTRATE AND ACETAMINOPHEN 5; 325 MG/1; MG/1
1 TABLET ORAL EVERY 4 HOURS PRN
Status: DISCONTINUED | OUTPATIENT
Start: 2023-05-11 | End: 2023-05-11 | Stop reason: HOSPADM

## 2023-05-11 RX ORDER — BUPIVACAINE HYDROCHLORIDE 5 MG/ML
INJECTION, SOLUTION PERINEURAL
Status: DISCONTINUED | OUTPATIENT
Start: 2023-05-11 | End: 2023-05-11 | Stop reason: HOSPADM

## 2023-05-11 RX ORDER — PROCHLORPERAZINE EDISYLATE 5 MG/ML
5 INJECTION INTRAMUSCULAR; INTRAVENOUS EVERY 30 MIN PRN
Status: DISCONTINUED | OUTPATIENT
Start: 2023-05-11 | End: 2023-05-11 | Stop reason: HOSPADM

## 2023-05-11 RX ORDER — CEFAZOLIN SODIUM 2 G/50ML
2 SOLUTION INTRAVENOUS
Status: DISCONTINUED | OUTPATIENT
Start: 2023-05-11 | End: 2023-05-11 | Stop reason: HOSPADM

## 2023-05-11 RX ORDER — MIDAZOLAM HYDROCHLORIDE 1 MG/ML
INJECTION INTRAMUSCULAR; INTRAVENOUS
Status: DISCONTINUED | OUTPATIENT
Start: 2023-05-11 | End: 2023-05-11

## 2023-05-11 RX ORDER — EPHEDRINE SULFATE 50 MG/ML
INJECTION, SOLUTION INTRAVENOUS
Status: DISCONTINUED | OUTPATIENT
Start: 2023-05-11 | End: 2023-05-11

## 2023-05-11 RX ORDER — LIDOCAINE HYDROCHLORIDE 20 MG/ML
INJECTION INTRAVENOUS
Status: DISCONTINUED | OUTPATIENT
Start: 2023-05-11 | End: 2023-05-11

## 2023-05-11 RX ORDER — SCOLOPAMINE TRANSDERMAL SYSTEM 1 MG/1
1 PATCH, EXTENDED RELEASE TRANSDERMAL
Status: DISCONTINUED | OUTPATIENT
Start: 2023-05-11 | End: 2023-05-11 | Stop reason: HOSPADM

## 2023-05-11 RX ORDER — FENTANYL CITRATE 50 UG/ML
25 INJECTION, SOLUTION INTRAMUSCULAR; INTRAVENOUS EVERY 5 MIN PRN
Status: DISCONTINUED | OUTPATIENT
Start: 2023-05-11 | End: 2023-05-11 | Stop reason: HOSPADM

## 2023-05-11 RX ORDER — HYDROCODONE BITARTRATE AND ACETAMINOPHEN 5; 325 MG/1; MG/1
1 TABLET ORAL EVERY 4 HOURS PRN
Qty: 10 TABLET | Refills: 0 | Status: SHIPPED | OUTPATIENT
Start: 2023-05-11 | End: 2024-01-24

## 2023-05-11 RX ORDER — DEXAMETHASONE SODIUM PHOSPHATE 4 MG/ML
INJECTION, SOLUTION INTRA-ARTICULAR; INTRALESIONAL; INTRAMUSCULAR; INTRAVENOUS; SOFT TISSUE
Status: DISCONTINUED | OUTPATIENT
Start: 2023-05-11 | End: 2023-05-11

## 2023-05-11 RX ORDER — ACETAMINOPHEN 10 MG/ML
INJECTION, SOLUTION INTRAVENOUS
Status: DISCONTINUED | OUTPATIENT
Start: 2023-05-11 | End: 2023-05-11

## 2023-05-11 RX ORDER — AMOXICILLIN 250 MG
1 CAPSULE ORAL 2 TIMES DAILY
COMMUNITY
Start: 2023-05-11 | End: 2023-06-07

## 2023-05-11 RX ORDER — KETOROLAC TROMETHAMINE 30 MG/ML
INJECTION, SOLUTION INTRAMUSCULAR; INTRAVENOUS
Status: DISCONTINUED | OUTPATIENT
Start: 2023-05-11 | End: 2023-05-11

## 2023-05-11 RX ORDER — PROPOFOL 10 MG/ML
VIAL (ML) INTRAVENOUS
Status: DISCONTINUED | OUTPATIENT
Start: 2023-05-11 | End: 2023-05-11

## 2023-05-11 RX ORDER — CEFAZOLIN SODIUM 1 G/3ML
INJECTION, POWDER, FOR SOLUTION INTRAMUSCULAR; INTRAVENOUS
Status: DISCONTINUED | OUTPATIENT
Start: 2023-05-11 | End: 2023-05-11

## 2023-05-11 RX ORDER — PHENYLEPHRINE HYDROCHLORIDE 10 MG/ML
INJECTION INTRAVENOUS
Status: DISCONTINUED | OUTPATIENT
Start: 2023-05-11 | End: 2023-05-11

## 2023-05-11 RX ORDER — HYDROMORPHONE HYDROCHLORIDE 2 MG/ML
0.2 INJECTION, SOLUTION INTRAMUSCULAR; INTRAVENOUS; SUBCUTANEOUS EVERY 5 MIN PRN
Status: DISCONTINUED | OUTPATIENT
Start: 2023-05-11 | End: 2023-05-11 | Stop reason: HOSPADM

## 2023-05-11 RX ORDER — LIDOCAINE HYDROCHLORIDE 10 MG/ML
1 INJECTION, SOLUTION EPIDURAL; INFILTRATION; INTRACAUDAL; PERINEURAL ONCE
Status: DISCONTINUED | OUTPATIENT
Start: 2023-05-11 | End: 2023-05-11 | Stop reason: HOSPADM

## 2023-05-11 RX ORDER — ROCURONIUM BROMIDE 10 MG/ML
INJECTION, SOLUTION INTRAVENOUS
Status: DISCONTINUED | OUTPATIENT
Start: 2023-05-11 | End: 2023-05-11

## 2023-05-11 RX ORDER — SODIUM CHLORIDE, SODIUM LACTATE, POTASSIUM CHLORIDE, CALCIUM CHLORIDE 600; 310; 30; 20 MG/100ML; MG/100ML; MG/100ML; MG/100ML
INJECTION, SOLUTION INTRAVENOUS CONTINUOUS
Status: DISCONTINUED | OUTPATIENT
Start: 2023-05-11 | End: 2023-05-11 | Stop reason: HOSPADM

## 2023-05-11 RX ORDER — ONDANSETRON 2 MG/ML
4 INJECTION INTRAMUSCULAR; INTRAVENOUS DAILY PRN
Status: DISCONTINUED | OUTPATIENT
Start: 2023-05-11 | End: 2023-05-11 | Stop reason: HOSPADM

## 2023-05-11 RX ORDER — FENTANYL CITRATE 50 UG/ML
INJECTION, SOLUTION INTRAMUSCULAR; INTRAVENOUS
Status: DISCONTINUED | OUTPATIENT
Start: 2023-05-11 | End: 2023-05-11

## 2023-05-11 RX ADMIN — FENTANYL CITRATE 25 MCG: 50 INJECTION, SOLUTION INTRAMUSCULAR; INTRAVENOUS at 12:05

## 2023-05-11 RX ADMIN — LIDOCAINE HYDROCHLORIDE 100 MG: 20 INJECTION, SOLUTION INTRAVENOUS at 09:05

## 2023-05-11 RX ADMIN — PHENYLEPHRINE HYDROCHLORIDE 200 MCG: 10 INJECTION INTRAVENOUS at 09:05

## 2023-05-11 RX ADMIN — ROCURONIUM BROMIDE 50 MG: 10 INJECTION, SOLUTION INTRAVENOUS at 09:05

## 2023-05-11 RX ADMIN — PHENYLEPHRINE HYDROCHLORIDE 200 MCG: 10 INJECTION INTRAVENOUS at 10:05

## 2023-05-11 RX ADMIN — MIDAZOLAM HYDROCHLORIDE 2 MG: 1 INJECTION, SOLUTION INTRAMUSCULAR; INTRAVENOUS at 09:05

## 2023-05-11 RX ADMIN — HYDROMORPHONE HYDROCHLORIDE 0.2 MG: 2 INJECTION INTRAMUSCULAR; INTRAVENOUS; SUBCUTANEOUS at 12:05

## 2023-05-11 RX ADMIN — EPHEDRINE SULFATE 10 MG: 50 INJECTION, SOLUTION INTRAMUSCULAR; INTRAVENOUS; SUBCUTANEOUS at 10:05

## 2023-05-11 RX ADMIN — PROPOFOL 200 MG: 10 INJECTION, EMULSION INTRAVENOUS at 09:05

## 2023-05-11 RX ADMIN — FENTANYL CITRATE 100 MCG: 50 INJECTION, SOLUTION INTRAMUSCULAR; INTRAVENOUS at 09:05

## 2023-05-11 RX ADMIN — ONDANSETRON 8 MG: 2 INJECTION, SOLUTION INTRAMUSCULAR; INTRAVENOUS at 10:05

## 2023-05-11 RX ADMIN — KETOROLAC TROMETHAMINE 30 MG: 30 INJECTION, SOLUTION INTRAMUSCULAR; INTRAVENOUS at 11:05

## 2023-05-11 RX ADMIN — ACETAMINOPHEN 1000 MG: 10 INJECTION, SOLUTION INTRAVENOUS at 11:05

## 2023-05-11 RX ADMIN — SODIUM CHLORIDE, SODIUM LACTATE, POTASSIUM CHLORIDE, AND CALCIUM CHLORIDE: .6; .31; .03; .02 INJECTION, SOLUTION INTRAVENOUS at 10:05

## 2023-05-11 RX ADMIN — CEFAZOLIN 2 G: 330 INJECTION, POWDER, FOR SOLUTION INTRAMUSCULAR; INTRAVENOUS at 09:05

## 2023-05-11 RX ADMIN — DEXAMETHASONE SODIUM PHOSPHATE 8 MG: 4 INJECTION, SOLUTION INTRA-ARTICULAR; INTRALESIONAL; INTRAMUSCULAR; INTRAVENOUS; SOFT TISSUE at 09:05

## 2023-05-11 RX ADMIN — SCOPALAMINE 1 PATCH: 1 PATCH, EXTENDED RELEASE TRANSDERMAL at 07:05

## 2023-05-11 RX ADMIN — ROCURONIUM BROMIDE 10 MG: 10 INJECTION, SOLUTION INTRAVENOUS at 10:05

## 2023-05-11 RX ADMIN — SODIUM CHLORIDE, POTASSIUM CHLORIDE, SODIUM LACTATE AND CALCIUM CHLORIDE: 600; 310; 30; 20 INJECTION, SOLUTION INTRAVENOUS at 07:05

## 2023-05-11 RX ADMIN — SUGAMMADEX 200 MG: 100 INJECTION, SOLUTION INTRAVENOUS at 11:05

## 2023-05-11 RX ADMIN — SODIUM CHLORIDE, SODIUM LACTATE, POTASSIUM CHLORIDE, AND CALCIUM CHLORIDE: .6; .31; .03; .02 INJECTION, SOLUTION INTRAVENOUS at 09:05

## 2023-05-11 NOTE — ANESTHESIA PROCEDURE NOTES
Intubation    Date/Time: 5/11/2023 9:44 AM  Performed by: Enrique Mendez CRNA  Authorized by: Aron Interiano MD     Intubation:     Induction:  Intravenous    Intubated:  Postinduction    Mask Ventilation:  Easy mask    Attempts:  1    Attempted By:  CRNA    Method of Intubation:  Video laryngoscopy    Blade:  Hernandez 3    Laryngeal View Grade: Grade I - full view of cords      Difficult Airway Encountered?: No      Complications:  None    Airway Device:  Oral endotracheal tube    Airway Device Size:  7.5    Style/Cuff Inflation:  Cuffed    Inflation Amount (mL):  6    Tube secured:  22    Secured at:  The lips    Placement Verified By:  Capnometry    Complicating Factors:  Large/floppy epiglottis    Findings Post-Intubation:  BS equal bilateral and atraumatic/condition of teeth unchanged

## 2023-05-11 NOTE — TRANSFER OF CARE
"Anesthesia Transfer of Care Note    Patient: Jose Fox    Procedure(s) Performed: Procedure(s) (LRB):  ROBOTIC REPAIR, HERNIA, VENTRAL (N/A)    Patient location: PACU    Anesthesia Type: general    Transport from OR: Transported from OR on 6-10 L/min O2 by face mask with adequate spontaneous ventilation    Post pain: adequate analgesia    Post assessment: no apparent anesthetic complications and tolerated procedure well    Post vital signs: stable    Level of consciousness: awake and alert    Nausea/Vomiting: no nausea/vomiting    Complications: none    Transfer of care protocol was followed      Last vitals:   Visit Vitals  /72   Pulse 84   Temp 36.8 °C (98.2 °F) (Skin)   Resp 13   Ht 6' 2" (1.88 m)   Wt 114.3 kg (252 lb)   SpO2 100%   BMI 32.35 kg/m²     "

## 2023-05-11 NOTE — ANESTHESIA POSTPROCEDURE EVALUATION
Anesthesia Post Evaluation    Patient: Jose Fox    Procedure(s) Performed: Procedure(s) (LRB):  ROBOTIC REPAIR, HERNIA, VENTRAL (N/A)    Final Anesthesia Type: general      Patient location during evaluation: PACU  Patient participation: Yes- Able to Participate  Level of consciousness: awake and alert  Post-procedure vital signs: reviewed and stable  Pain management: adequate  Airway patency: patent    PONV status at discharge: No PONV  Anesthetic complications: no      Cardiovascular status: stable  Respiratory status: spontaneous ventilation  Hydration status: euvolemic  Follow-up not needed.          Vitals Value Taken Time   /75 05/11/23 1220   Temp 36.8 °C (98.2 °F) 05/11/23 1130   Pulse 96 05/11/23 1222   Resp 14 05/11/23 1222   SpO2 96 % 05/11/23 1222   Vitals shown include unvalidated device data.      No case tracking events are documented in the log.      Pain/Rafael Score: Pain Rating Prior to Med Admin: 6 (5/11/2023 12:17 PM)  Rafael Score: 9 (5/11/2023 12:00 PM)

## 2023-05-11 NOTE — OP NOTE
DATE OF PROCEDURE:  5/11/2023    PREOPERATIVE DIAGNOSIS:  Incarcerated Ventral hernia    POSTOPERATIVE DIAGNOSIS:  Incarcerated Ventral hernia    PROCEDURE:  Robot assistant laparoscopic incarcerated ventral hernia repair with 12cm symbotex mesh    SURGEON:  José Miguel Espino M.D.    ASSISTANT:  Raymond Wang PGY2    ANESTHESIA:  General endotracheal.    PREP:  Chlorhexidine.    SPECIMEN:  None    ESTIMATED BLOOD LOSS:  Minimal.    INDICATIONS:  The patient is a 48M who presents to clinic with   Symptomatic ventral hernia.  The patient was counseled on his options for   treatment and desired to proceed with surgical intervention.  The risks of the   procedure were described to the patient including bleeding, infection, pain,   scarring, wound complications, and recurrence.  The patient demonstrated understanding of these risks and a consent   form was obtained.    PROCEDURE:  The patient was identified in the Preoperative Unit and taken back   to the Operating Room and laid supine on the operating room table.  IV   antibiotics were administered prior to the induction of general anesthesia.    General anesthesia was induced without complication.  The patient was then   prepped and draped in standard sterile fashion manner.  A timeout procedure was   performed in accordance of hospital protocol.      A 8 mm incision was made in the left upper quadrant location using a #15 blade scalpel.  A 8mm optical trocar was used to enter the abdomen.  Once we confirmed an   intra-abdominal presence, CO2 insufflation was initiated.  A camera was inserted and the abdomen   was inspected.  There did not appear to be any abnormalities within the   Abdomen.  At this point, an 8mm left sided trocar and a 8-mm left lower quadrant   trocars were then placed under direct visualization.   The hernia defect was identified and did not contain any bowel. A tongue of omentum was reduced using blunt dissection and cautery.   There were two  defects noted. THe main on just above the umbilicus measured about 3cm. Immediately inferior to this was a 1cm umbilical hernia defect. The distance from the inferior aspect of the inferior defect to the superior aspect of the superior defect was 4cm. The preperitoneal fat was then taken down starting left lateral side using the blunt dissection. The defects were closed using 1 PDS strattafix suture in a running fashion. A 12cm round symbotex mesh was then inserted and secured using 2-0 vloc sutures circumferentially.    The mesh was noted to be flat and it good position with good coverage of the hernia.   The peritoneum was closed using 2-0 vloc completely covering the mesh.   Skin was closed using 4-0 monocryl and sterile dressings were applied.      The patient was awakened from anesthesia without   complication and returned to the Postop Recovery in stable condition.  At the   end of the case, sponge and needle counts were correct on 2 occasions.  I was   present and scrubbed throughout the entirety of the case.    COMPLICATIONS:  None.    CONDITION:  Stable.

## 2023-05-17 NOTE — DISCHARGE SUMMARY
[facility] ([facility])  Short Stay  Discharge Summary    Admit Date: 5/11/2023    Discharge Date and Time: 5/11/2023  2:48 PM      Discharge Attending Physician: No att. providers found     Hospital Course (synopsis of major diagnoses, care, treatment, and services provided during the course of the hospital stay): Patient brought in for elective surgery. Underwent procedure without complication and was discharged.       Final Diagnoses:    Principal Problem: Ventral hernia without obstruction or gangrene   Secondary Diagnoses:   Active Hospital Problems    Diagnosis  POA    *Ventral hernia without obstruction or gangrene [K43.9]  Yes      Resolved Hospital Problems   No resolved problems to display.       Discharged Condition: stable    Disposition: Home or Self Care    Follow up/Patient Instructions:    Medications:  Reconciled Home Medications:      Medication List        START taking these medications      HYDROcodone-acetaminophen 5-325 mg per tablet  Commonly known as: NORCO  Take 1 tablet by mouth every 4 (four) hours as needed for Pain.     senna-docusate 8.6-50 mg 8.6-50 mg per tablet  Commonly known as: PERICOLACE  Take 1 tablet by mouth 2 (two) times daily.            CONTINUE taking these medications      buPROPion 300 MG 24 hr tablet  Commonly known as: WELLBUTRIN XL  Take 1 tablet (300 mg total) by mouth once daily.     diclofenac sodium 1 % Gel  Commonly known as: VOLTAREN  Apply 2 g topically 4 (four) times daily.     febuxostat 40 mg Tab  Commonly known as: ULORIC  Take 1 tablet (40 mg total) by mouth once daily.     indomethacin 50 MG capsule  Commonly known as: INDOCIN  Take 1 capsule (50 mg total) by mouth every meal as needed (For acute flare-up of gout).     losartan-hydrochlorothiazide 100-12.5 mg 100-12.5 mg Tab  Commonly known as: HYZAAR  Take 1 tablet by mouth once daily.     semaglutide 1 mg/dose (2 mg/1.5 mL) Pnij  Commonly known as: OZEMPIC  Inject 1 mg into the skin every 7 days.      sumatriptan 50 MG tablet  Commonly known as: IMITREX  TAKE 1 TABLET (50 MG TOTAL) BY MOUTH EVERY 4 (FOUR) HOURS AS NEEDED FOR MIGRAINE. NOT TO EXCEED 3 TABS PER DAY            Discharge Procedure Orders   Diet general     Remove dressing in 48 hours     Call MD for:  temperature >100.4     Call MD for:  persistent nausea and vomiting     Call MD for:  severe uncontrolled pain     Shower on day dressing removed (No bath)      Follow-up Information       José Miguel Espino MD Follow up in 2 week(s).    Specialties: Surgery, General Surgery  Contact information:  [address]  [address]  [facility] [address] [phone]

## 2023-06-06 NOTE — PROGRESS NOTES
HISTORY:    48-year-old male with a history of hypertension, obesity, migraines, Hashimoto's thyroiditis, and ventral hernia status post repair 2023 presenting for initial evaluation by me.    Patient reports that he was told he had a heart attack based on his ecg back in '16. Ended up having a cardiac catheterization that was unremarkable at that time. Was having MS pain at that time. Patient has not followed up with cardiology since. This was in [place].    At this time patient reports feeling well.    The patient denies any symptoms of chest pain, shortness of breath, or dyspnea on exertion.    Good exercise capacity. Goes to the gym 4x/week at baseline with cardio and weight lifting. Has not gone back post ventral hernia repair that went well.    He currently tolerates losartan-hydrochlorothiazide 100-12.5 x 1. Does not check Bps at home.    PHYSICAL EXAM:    Vitals:    06/07/23 0846   BP: (!) 158/101   Pulse: 74   Resp: 20       NAD, A+Ox3.  No jvd, no bruit.  RRR nml s1,s2. No murmurs.  CTA B no wheezes or crackles.  No edema.    LABS/STUDIES (imaging reviewed during clinic visit):    March 2023 hemoglobin 15.3.  Creatinine 1.4 with a BUN of 22.  Albumin 4.1.   and HDL 30.  Triglycerides 55.  A1c normal.  TSH normal.    ECG April 2023 sinus rhythm no Q-waves or ST changes.  LAFB/PRWP.    ASSESSMENT & PLAN:    1. Essential hypertension    2. Abnormal finding on EKG        Orders Placed This Encounter    Echo    amLODIPine (NORVASC) 10 MG tablet        Patient with unclear CV hx. Seems like he had an abnormal ecg with an unremarkable chapman. Will check a TTE.    Poorly controlled hypertension. Cont losartan-hctz 100-12.5. Add amlodipine 10x1. Pt needs to start monitoring at home.     LDL acceptable at this time.    We discussed the importance of diet modifications and instituting an exercise regimen. Patient motivated to lose weight.       Follow up in about 3 months (around 9/7/2023).      Gerardo Gaspar  MD

## 2023-06-07 ENCOUNTER — OFFICE VISIT (OUTPATIENT)
Dept: CARDIOLOGY | Facility: CLINIC | Age: 48
End: 2023-06-07
Payer: COMMERCIAL

## 2023-06-07 ENCOUNTER — OFFICE VISIT (OUTPATIENT)
Dept: SURGERY | Facility: CLINIC | Age: 48
End: 2023-06-07
Payer: COMMERCIAL

## 2023-06-07 VITALS
DIASTOLIC BLOOD PRESSURE: 101 MMHG | HEART RATE: 74 BPM | SYSTOLIC BLOOD PRESSURE: 158 MMHG | HEIGHT: 74 IN | RESPIRATION RATE: 20 BRPM | BODY MASS INDEX: 32.73 KG/M2 | WEIGHT: 255 LBS

## 2023-06-07 VITALS
DIASTOLIC BLOOD PRESSURE: 99 MMHG | HEIGHT: 74 IN | HEART RATE: 83 BPM | BODY MASS INDEX: 32.25 KG/M2 | SYSTOLIC BLOOD PRESSURE: 147 MMHG | WEIGHT: 251.31 LBS

## 2023-06-07 DIAGNOSIS — I10 ESSENTIAL HYPERTENSION: ICD-10-CM

## 2023-06-07 DIAGNOSIS — Z87.19 S/P HERNIA REPAIR: Primary | ICD-10-CM

## 2023-06-07 DIAGNOSIS — Z98.890 S/P HERNIA REPAIR: Primary | ICD-10-CM

## 2023-06-07 DIAGNOSIS — R94.31 ABNORMAL FINDING ON EKG: ICD-10-CM

## 2023-06-07 PROCEDURE — 3080F PR MOST RECENT DIASTOLIC BLOOD PRESSURE >= 90 MM HG: ICD-10-PCS | Mod: CPTII,S$GLB,, | Performed by: STUDENT IN AN ORGANIZED HEALTH CARE EDUCATION/TRAINING PROGRAM

## 2023-06-07 PROCEDURE — 3080F PR MOST RECENT DIASTOLIC BLOOD PRESSURE >= 90 MM HG: ICD-10-PCS | Mod: CPTII,S$GLB,, | Performed by: INTERNAL MEDICINE

## 2023-06-07 PROCEDURE — 99213 OFFICE O/P EST LOW 20 MIN: CPT | Mod: S$GLB,,, | Performed by: STUDENT IN AN ORGANIZED HEALTH CARE EDUCATION/TRAINING PROGRAM

## 2023-06-07 PROCEDURE — 99204 OFFICE O/P NEW MOD 45 MIN: CPT | Mod: S$GLB,,, | Performed by: INTERNAL MEDICINE

## 2023-06-07 PROCEDURE — 1160F RVW MEDS BY RX/DR IN RCRD: CPT | Mod: CPTII,S$GLB,, | Performed by: INTERNAL MEDICINE

## 2023-06-07 PROCEDURE — 1159F MED LIST DOCD IN RCRD: CPT | Mod: CPTII,S$GLB,, | Performed by: INTERNAL MEDICINE

## 2023-06-07 PROCEDURE — 99999 PR PBB SHADOW E&M-EST. PATIENT-LVL IV: ICD-10-PCS | Mod: PBBFAC,,, | Performed by: INTERNAL MEDICINE

## 2023-06-07 PROCEDURE — 3008F BODY MASS INDEX DOCD: CPT | Mod: CPTII,S$GLB,, | Performed by: STUDENT IN AN ORGANIZED HEALTH CARE EDUCATION/TRAINING PROGRAM

## 2023-06-07 PROCEDURE — 99999 PR PBB SHADOW E&M-EST. PATIENT-LVL III: CPT | Mod: PBBFAC,,, | Performed by: STUDENT IN AN ORGANIZED HEALTH CARE EDUCATION/TRAINING PROGRAM

## 2023-06-07 PROCEDURE — 3080F DIAST BP >= 90 MM HG: CPT | Mod: CPTII,S$GLB,, | Performed by: STUDENT IN AN ORGANIZED HEALTH CARE EDUCATION/TRAINING PROGRAM

## 2023-06-07 PROCEDURE — 3008F PR BODY MASS INDEX (BMI) DOCUMENTED: ICD-10-PCS | Mod: CPTII,S$GLB,, | Performed by: STUDENT IN AN ORGANIZED HEALTH CARE EDUCATION/TRAINING PROGRAM

## 2023-06-07 PROCEDURE — 3008F BODY MASS INDEX DOCD: CPT | Mod: CPTII,S$GLB,, | Performed by: INTERNAL MEDICINE

## 2023-06-07 PROCEDURE — 1159F PR MEDICATION LIST DOCUMENTED IN MEDICAL RECORD: ICD-10-PCS | Mod: CPTII,S$GLB,, | Performed by: INTERNAL MEDICINE

## 2023-06-07 PROCEDURE — 1160F PR REVIEW ALL MEDS BY PRESCRIBER/CLIN PHARMACIST DOCUMENTED: ICD-10-PCS | Mod: CPTII,S$GLB,, | Performed by: INTERNAL MEDICINE

## 2023-06-07 PROCEDURE — 3044F PR MOST RECENT HEMOGLOBIN A1C LEVEL <7.0%: ICD-10-PCS | Mod: CPTII,S$GLB,, | Performed by: INTERNAL MEDICINE

## 2023-06-07 PROCEDURE — 3008F PR BODY MASS INDEX (BMI) DOCUMENTED: ICD-10-PCS | Mod: CPTII,S$GLB,, | Performed by: INTERNAL MEDICINE

## 2023-06-07 PROCEDURE — 3077F SYST BP >= 140 MM HG: CPT | Mod: CPTII,S$GLB,, | Performed by: STUDENT IN AN ORGANIZED HEALTH CARE EDUCATION/TRAINING PROGRAM

## 2023-06-07 PROCEDURE — 99204 PR OFFICE/OUTPT VISIT, NEW, LEVL IV, 45-59 MIN: ICD-10-PCS | Mod: S$GLB,,, | Performed by: INTERNAL MEDICINE

## 2023-06-07 PROCEDURE — 3044F HG A1C LEVEL LT 7.0%: CPT | Mod: CPTII,S$GLB,, | Performed by: INTERNAL MEDICINE

## 2023-06-07 PROCEDURE — 3077F PR MOST RECENT SYSTOLIC BLOOD PRESSURE >= 140 MM HG: ICD-10-PCS | Mod: CPTII,S$GLB,, | Performed by: INTERNAL MEDICINE

## 2023-06-07 PROCEDURE — 99213 PR OFFICE/OUTPT VISIT, EST, LEVL III, 20-29 MIN: ICD-10-PCS | Mod: S$GLB,,, | Performed by: STUDENT IN AN ORGANIZED HEALTH CARE EDUCATION/TRAINING PROGRAM

## 2023-06-07 PROCEDURE — 99999 PR PBB SHADOW E&M-EST. PATIENT-LVL III: ICD-10-PCS | Mod: PBBFAC,,, | Performed by: STUDENT IN AN ORGANIZED HEALTH CARE EDUCATION/TRAINING PROGRAM

## 2023-06-07 PROCEDURE — 3044F PR MOST RECENT HEMOGLOBIN A1C LEVEL <7.0%: ICD-10-PCS | Mod: CPTII,S$GLB,, | Performed by: STUDENT IN AN ORGANIZED HEALTH CARE EDUCATION/TRAINING PROGRAM

## 2023-06-07 PROCEDURE — 3080F DIAST BP >= 90 MM HG: CPT | Mod: CPTII,S$GLB,, | Performed by: INTERNAL MEDICINE

## 2023-06-07 PROCEDURE — 3077F PR MOST RECENT SYSTOLIC BLOOD PRESSURE >= 140 MM HG: ICD-10-PCS | Mod: CPTII,S$GLB,, | Performed by: STUDENT IN AN ORGANIZED HEALTH CARE EDUCATION/TRAINING PROGRAM

## 2023-06-07 PROCEDURE — 3044F HG A1C LEVEL LT 7.0%: CPT | Mod: CPTII,S$GLB,, | Performed by: STUDENT IN AN ORGANIZED HEALTH CARE EDUCATION/TRAINING PROGRAM

## 2023-06-07 PROCEDURE — 99999 PR PBB SHADOW E&M-EST. PATIENT-LVL IV: CPT | Mod: PBBFAC,,, | Performed by: INTERNAL MEDICINE

## 2023-06-07 PROCEDURE — 3077F SYST BP >= 140 MM HG: CPT | Mod: CPTII,S$GLB,, | Performed by: INTERNAL MEDICINE

## 2023-06-07 RX ORDER — AMLODIPINE BESYLATE 10 MG/1
10 TABLET ORAL DAILY
Qty: 90 TABLET | Refills: 3 | Status: SHIPPED | OUTPATIENT
Start: 2023-06-07 | End: 2023-11-10 | Stop reason: SDUPTHER

## 2023-06-07 NOTE — PROGRESS NOTES
Patient ID: Jose Fox is a 48 y.o. male.    Chief Complaint: Hernia      HPI:  HPI  S/p robot VHR  Minimal pain now  Better every day  Ambulating well      Review of Systems   Constitutional:  Negative for chills, diaphoresis and fever.   HENT:  Negative for trouble swallowing.    Respiratory:  Negative for cough, shortness of breath, wheezing and stridor.    Cardiovascular:  Negative for chest pain and palpitations.   Gastrointestinal:  Negative for abdominal distention, abdominal pain, blood in stool, diarrhea, nausea and vomiting.   Endocrine: Negative for cold intolerance and heat intolerance.   Genitourinary:  Negative for difficulty urinating.   Musculoskeletal:  Negative for back pain.   Skin:  Negative for rash.   Allergic/Immunologic: Negative for immunocompromised state.   Neurological:  Negative for dizziness, syncope and numbness.   Hematological:  Negative for adenopathy.   Psychiatric/Behavioral:  Negative for agitation.      Current Outpatient Medications   Medication Sig Dispense Refill    buPROPion (WELLBUTRIN XL) 300 MG 24 hr tablet Take 1 tablet (300 mg total) by mouth once daily. 30 tablet 11    febuxostat (ULORIC) 40 mg Tab Take 1 tablet (40 mg total) by mouth once daily. 90 tablet 3    indomethacin (INDOCIN) 50 MG capsule Take 1 capsule (50 mg total) by mouth every meal as needed (For acute flare-up of gout). 90 capsule 3    losartan-hydrochlorothiazide 100-12.5 mg (HYZAAR) 100-12.5 mg Tab Take 1 tablet by mouth once daily. 90 tablet 3    semaglutide (OZEMPIC) 1 mg/dose (2 mg/1.5 mL) PnIj Inject 1 mg into the skin every 7 days. 4 each 5    sumatriptan (IMITREX) 50 MG tablet TAKE 1 TABLET (50 MG TOTAL) BY MOUTH EVERY 4 (FOUR) HOURS AS NEEDED FOR MIGRAINE. NOT TO EXCEED 3 TABS PER DAY 12 tablet 1    amLODIPine (NORVASC) 10 MG tablet Take 1 tablet (10 mg total) by mouth once daily. 90 tablet 3    diclofenac sodium (VOLTAREN) 1 % Gel Apply 2 g topically 4 (four) times daily. 100 g 2     HYDROcodone-acetaminophen (NORCO) 5-325 mg per tablet Take 1 tablet by mouth every 4 (four) hours as needed for Pain. 10 tablet 0     No current facility-administered medications for this visit.       Review of patient's allergies indicates:  No Known Allergies    Past Medical History:   Diagnosis Date    Gout     Hypertension     Hypothyroidism     Thyroid disease        Past Surgical History:   Procedure Laterality Date    CARDIAC ELECTROPHYSIOLOGY STUDY AND ABLATION  2010    LEG SURGERY Left     ROBOT-ASSISTED LAPAROSCOPIC REPAIR OF VENTRAL HERNIA N/A 5/11/2023    Procedure: ROBOTIC REPAIR, HERNIA, VENTRAL;  Surgeon: José Miguel Espino MD;  Location: [facility];  Service: General;  Laterality: N/A;  make second case       Family History   Problem Relation Age of Onset    Hypertension Mother     Hypertension Father     Diabetes Father        Social History     Socioeconomic History    Marital status:     Number of children: 3   Occupational History    Occupation: [employer]      Comment: [employer]   Tobacco Use    Smoking status: Never    Smokeless tobacco: Never   Substance and Sexual Activity    Alcohol use: Never    Drug use: Never    Sexual activity: Yes     Partners: Female   Social History Narrative    23.18    25      Social Determinants of Health     Financial Resource Strain: Low Risk     Difficulty of Paying Living Expenses: Not hard at all   Food Insecurity: No Food Insecurity    Worried About Running Out of Food in the Last Year: Never true    Ran Out of Food in the Last Year: Never true   Transportation Needs: No Transportation Needs    Lack of Transportation (Medical): No    Lack of Transportation (Non-Medical): No   Physical Activity: Sufficiently Active    Days of Exercise per Week: 3 days    Minutes of Exercise per Session: 80 min   Stress: Stress Concern Present    Feeling of Stress : To some extent   Social Connections: Unknown    Frequency of Communication with Friends and Family:  More than three times a week    Frequency of Social Gatherings with Friends and Family: Never    Active Member of Clubs or Organizations: No    Attends Club or Organization Meetings: Patient refused    Marital Status:    Housing Stability: Low Risk     Unable to Pay for Housing in the Last Year: No    Number of Places Lived in the Last Year: 1    Unstable Housing in the Last Year: No       Vitals:    06/07/23 0752   BP: (!) 147/99   Pulse: 83       Physical Exam  Constitutional:       General: He is not in acute distress.  HENT:      Head: Normocephalic and atraumatic.   Eyes:      General: No scleral icterus.  Cardiovascular:      Rate and Rhythm: Normal rate.   Pulmonary:      Effort: Pulmonary effort is normal. No respiratory distress.      Breath sounds: No stridor.   Abdominal:      Palpations: Abdomen is soft.      Tenderness: There is no abdominal tenderness.   Lymphadenopathy:      Cervical: No cervical adenopathy.   Skin:     General: Skin is warm.      Findings: No erythema.   Neurological:      Mental Status: He is alert and oriented to person, place, and time.   Psychiatric:         Behavior: Behavior normal.   Body mass index is 32.27 kg/m².  Incisions good    Assessment & Plan:  S/p robot VHR  Gradually increase activity  RTC prn

## 2023-06-19 DIAGNOSIS — R73.03 PREDIABETES: ICD-10-CM

## 2023-06-19 DIAGNOSIS — E66.9 OBESITY (BMI 30.0-34.9): ICD-10-CM

## 2023-06-25 ENCOUNTER — PATIENT MESSAGE (OUTPATIENT)
Dept: PRIMARY CARE CLINIC | Facility: CLINIC | Age: 48
End: 2023-06-25
Payer: COMMERCIAL

## 2023-06-26 NOTE — TELEPHONE ENCOUNTER
For the next 1 month, work on implementing the following:  -  Aim for 5000-05253 steps/day  - 150+ minutes per week total  of moderate intensity cardio exercise (does not include walking)  - 15 minutes 3x per week of strength training exercise (weights, Pilates, Body weight exercise)  - Drink a total of 70+ ounces of water each day. No Drinks other than water, black coffee, or tea without sugar.  -  Drink an 8 oz glass of water immediately before eating each meal.  -  Aim for eating between 1300-1500cal/day (Use [employer] [place] to log calories ingested)  -  Make half of the plate with each meal a vegetable.  For the other half, it should be comprised of lean protein (fish, chickpeas, lean chicken, etc) or whole grain carbohydrate.  -  Avoid cooking with oils (butter, olive oil, etc.).   - Record your weight once a week 1st thing in the morning after using the restroom.  -  Keep a record of your inches once a week (arms, hips, thighs, waist)

## 2023-07-07 DIAGNOSIS — E66.9 OBESITY (BMI 30.0-34.9): ICD-10-CM

## 2023-07-07 DIAGNOSIS — R73.03 PREDIABETES: ICD-10-CM

## 2023-08-07 DIAGNOSIS — E66.9 OBESITY (BMI 30.0-34.9): ICD-10-CM

## 2023-08-07 DIAGNOSIS — R73.03 PREDIABETES: ICD-10-CM

## 2023-09-14 DIAGNOSIS — R73.03 PREDIABETES: ICD-10-CM

## 2023-09-14 DIAGNOSIS — E66.9 OBESITY (BMI 30.0-34.9): ICD-10-CM

## 2023-09-14 DIAGNOSIS — M1A.0710 CHRONIC IDIOPATHIC GOUT INVOLVING TOE OF RIGHT FOOT WITHOUT TOPHUS: ICD-10-CM

## 2023-09-14 DIAGNOSIS — M10.9 GOUT, UNSPECIFIED CAUSE, UNSPECIFIED CHRONICITY, UNSPECIFIED SITE: ICD-10-CM

## 2023-09-14 RX ORDER — INDOMETHACIN 50 MG/1
50 CAPSULE ORAL
Qty: 90 CAPSULE | Refills: 3 | Status: SHIPPED | OUTPATIENT
Start: 2023-09-14 | End: 2024-02-09 | Stop reason: SDUPTHER

## 2023-09-25 ENCOUNTER — LAB VISIT (OUTPATIENT)
Dept: LAB | Facility: HOSPITAL | Age: 48
End: 2023-09-25
Attending: STUDENT IN AN ORGANIZED HEALTH CARE EDUCATION/TRAINING PROGRAM
Payer: COMMERCIAL

## 2023-09-25 ENCOUNTER — OFFICE VISIT (OUTPATIENT)
Dept: PRIMARY CARE CLINIC | Facility: CLINIC | Age: 48
End: 2023-09-25
Payer: COMMERCIAL

## 2023-09-25 VITALS
SYSTOLIC BLOOD PRESSURE: 116 MMHG | DIASTOLIC BLOOD PRESSURE: 74 MMHG | WEIGHT: 257.5 LBS | BODY MASS INDEX: 33.05 KG/M2 | OXYGEN SATURATION: 98 % | HEART RATE: 77 BPM | HEIGHT: 74 IN

## 2023-09-25 DIAGNOSIS — Z12.12 ENCOUNTER FOR COLORECTAL CANCER SCREENING: ICD-10-CM

## 2023-09-25 DIAGNOSIS — R73.03 PREDIABETES: ICD-10-CM

## 2023-09-25 DIAGNOSIS — I10 ESSENTIAL HYPERTENSION: ICD-10-CM

## 2023-09-25 DIAGNOSIS — Z12.11 ENCOUNTER FOR COLORECTAL CANCER SCREENING: ICD-10-CM

## 2023-09-25 DIAGNOSIS — E05.90 SUBCLINICAL HYPERTHYROIDISM: ICD-10-CM

## 2023-09-25 DIAGNOSIS — E66.9 OBESITY (BMI 30.0-34.9): ICD-10-CM

## 2023-09-25 DIAGNOSIS — Z12.5 PROSTATE CANCER SCREENING: ICD-10-CM

## 2023-09-25 DIAGNOSIS — Z00.00 ANNUAL PHYSICAL EXAM: Primary | ICD-10-CM

## 2023-09-25 DIAGNOSIS — E06.3 HASHIMOTO'S THYROIDITIS: ICD-10-CM

## 2023-09-25 DIAGNOSIS — Z00.00 ANNUAL PHYSICAL EXAM: ICD-10-CM

## 2023-09-25 LAB
ALBUMIN SERPL BCP-MCNC: 4.1 G/DL (ref 3.5–5.2)
ALP SERPL-CCNC: 51 U/L (ref 55–135)
ALT SERPL W/O P-5'-P-CCNC: 40 U/L (ref 10–44)
ANION GAP SERPL CALC-SCNC: 7 MMOL/L (ref 8–16)
AST SERPL-CCNC: 29 U/L (ref 10–40)
BASOPHILS # BLD AUTO: 0.08 K/UL (ref 0–0.2)
BASOPHILS NFR BLD: 1.2 % (ref 0–1.9)
BILIRUB SERPL-MCNC: 0.8 MG/DL (ref 0.1–1)
BUN SERPL-MCNC: 18 MG/DL (ref 6–20)
CALCIUM SERPL-MCNC: 9.9 MG/DL (ref 8.7–10.5)
CHLORIDE SERPL-SCNC: 109 MMOL/L (ref 95–110)
CHOLEST SERPL-MCNC: 166 MG/DL (ref 120–199)
CHOLEST/HDLC SERPL: 4.7 {RATIO} (ref 2–5)
CO2 SERPL-SCNC: 26 MMOL/L (ref 23–29)
COMPLEXED PSA SERPL-MCNC: 1.3 NG/ML (ref 0–4)
CREAT SERPL-MCNC: 1.3 MG/DL (ref 0.5–1.4)
DIFFERENTIAL METHOD: ABNORMAL
EOSINOPHIL # BLD AUTO: 0.2 K/UL (ref 0–0.5)
EOSINOPHIL NFR BLD: 2.5 % (ref 0–8)
ERYTHROCYTE [DISTWIDTH] IN BLOOD BY AUTOMATED COUNT: 12.8 % (ref 11.5–14.5)
EST. GFR  (NO RACE VARIABLE): >60 ML/MIN/1.73 M^2
ESTIMATED AVG GLUCOSE: 111 MG/DL (ref 68–131)
GLUCOSE SERPL-MCNC: 137 MG/DL (ref 70–110)
HBA1C MFR BLD: 5.5 % (ref 4–5.6)
HCT VFR BLD AUTO: 46.2 % (ref 40–54)
HDLC SERPL-MCNC: 35 MG/DL (ref 40–75)
HDLC SERPL: 21.1 % (ref 20–50)
HGB BLD-MCNC: 15.4 G/DL (ref 14–18)
IMM GRANULOCYTES # BLD AUTO: 0.06 K/UL (ref 0–0.04)
IMM GRANULOCYTES NFR BLD AUTO: 0.9 % (ref 0–0.5)
LDLC SERPL CALC-MCNC: 116 MG/DL (ref 63–159)
LYMPHOCYTES # BLD AUTO: 1.9 K/UL (ref 1–4.8)
LYMPHOCYTES NFR BLD: 29.7 % (ref 18–48)
MCH RBC QN AUTO: 30.4 PG (ref 27–31)
MCHC RBC AUTO-ENTMCNC: 33.3 G/DL (ref 32–36)
MCV RBC AUTO: 91 FL (ref 82–98)
MONOCYTES # BLD AUTO: 0.6 K/UL (ref 0.3–1)
MONOCYTES NFR BLD: 9.3 % (ref 4–15)
NEUTROPHILS # BLD AUTO: 3.6 K/UL (ref 1.8–7.7)
NEUTROPHILS NFR BLD: 56.4 % (ref 38–73)
NONHDLC SERPL-MCNC: 131 MG/DL
NRBC BLD-RTO: 0 /100 WBC
PLATELET # BLD AUTO: 219 K/UL (ref 150–450)
PMV BLD AUTO: 10.5 FL (ref 9.2–12.9)
POTASSIUM SERPL-SCNC: 4.4 MMOL/L (ref 3.5–5.1)
PROT SERPL-MCNC: 7.6 G/DL (ref 6–8.4)
RBC # BLD AUTO: 5.06 M/UL (ref 4.6–6.2)
SODIUM SERPL-SCNC: 142 MMOL/L (ref 136–145)
T4 SERPL-MCNC: 9.7 UG/DL (ref 4.5–11.5)
TRIGL SERPL-MCNC: 75 MG/DL (ref 30–150)
TSH SERPL DL<=0.005 MIU/L-ACNC: 1.22 UIU/ML (ref 0.4–4)
WBC # BLD AUTO: 6.44 K/UL (ref 3.9–12.7)

## 2023-09-25 PROCEDURE — 36415 COLL VENOUS BLD VENIPUNCTURE: CPT | Performed by: STUDENT IN AN ORGANIZED HEALTH CARE EDUCATION/TRAINING PROGRAM

## 2023-09-25 PROCEDURE — 99999 PR PBB SHADOW E&M-EST. PATIENT-LVL IV: ICD-10-PCS | Mod: PBBFAC,,, | Performed by: STUDENT IN AN ORGANIZED HEALTH CARE EDUCATION/TRAINING PROGRAM

## 2023-09-25 PROCEDURE — 1159F MED LIST DOCD IN RCRD: CPT | Mod: CPTII,S$GLB,, | Performed by: STUDENT IN AN ORGANIZED HEALTH CARE EDUCATION/TRAINING PROGRAM

## 2023-09-25 PROCEDURE — 99396 PREV VISIT EST AGE 40-64: CPT | Mod: S$GLB,,, | Performed by: STUDENT IN AN ORGANIZED HEALTH CARE EDUCATION/TRAINING PROGRAM

## 2023-09-25 PROCEDURE — 1159F PR MEDICATION LIST DOCUMENTED IN MEDICAL RECORD: ICD-10-PCS | Mod: CPTII,S$GLB,, | Performed by: STUDENT IN AN ORGANIZED HEALTH CARE EDUCATION/TRAINING PROGRAM

## 2023-09-25 PROCEDURE — 1160F RVW MEDS BY RX/DR IN RCRD: CPT | Mod: CPTII,S$GLB,, | Performed by: STUDENT IN AN ORGANIZED HEALTH CARE EDUCATION/TRAINING PROGRAM

## 2023-09-25 PROCEDURE — 3074F SYST BP LT 130 MM HG: CPT | Mod: CPTII,S$GLB,, | Performed by: STUDENT IN AN ORGANIZED HEALTH CARE EDUCATION/TRAINING PROGRAM

## 2023-09-25 PROCEDURE — 99396 PR PREVENTIVE VISIT,EST,40-64: ICD-10-PCS | Mod: S$GLB,,, | Performed by: STUDENT IN AN ORGANIZED HEALTH CARE EDUCATION/TRAINING PROGRAM

## 2023-09-25 PROCEDURE — 3008F BODY MASS INDEX DOCD: CPT | Mod: CPTII,S$GLB,, | Performed by: STUDENT IN AN ORGANIZED HEALTH CARE EDUCATION/TRAINING PROGRAM

## 2023-09-25 PROCEDURE — 3044F HG A1C LEVEL LT 7.0%: CPT | Mod: CPTII,S$GLB,, | Performed by: STUDENT IN AN ORGANIZED HEALTH CARE EDUCATION/TRAINING PROGRAM

## 2023-09-25 PROCEDURE — 3008F PR BODY MASS INDEX (BMI) DOCUMENTED: ICD-10-PCS | Mod: CPTII,S$GLB,, | Performed by: STUDENT IN AN ORGANIZED HEALTH CARE EDUCATION/TRAINING PROGRAM

## 2023-09-25 PROCEDURE — 3044F PR MOST RECENT HEMOGLOBIN A1C LEVEL <7.0%: ICD-10-PCS | Mod: CPTII,S$GLB,, | Performed by: STUDENT IN AN ORGANIZED HEALTH CARE EDUCATION/TRAINING PROGRAM

## 2023-09-25 PROCEDURE — 84436 ASSAY OF TOTAL THYROXINE: CPT | Performed by: STUDENT IN AN ORGANIZED HEALTH CARE EDUCATION/TRAINING PROGRAM

## 2023-09-25 PROCEDURE — 80053 COMPREHEN METABOLIC PANEL: CPT | Performed by: STUDENT IN AN ORGANIZED HEALTH CARE EDUCATION/TRAINING PROGRAM

## 2023-09-25 PROCEDURE — 3074F PR MOST RECENT SYSTOLIC BLOOD PRESSURE < 130 MM HG: ICD-10-PCS | Mod: CPTII,S$GLB,, | Performed by: STUDENT IN AN ORGANIZED HEALTH CARE EDUCATION/TRAINING PROGRAM

## 2023-09-25 PROCEDURE — 3078F DIAST BP <80 MM HG: CPT | Mod: CPTII,S$GLB,, | Performed by: STUDENT IN AN ORGANIZED HEALTH CARE EDUCATION/TRAINING PROGRAM

## 2023-09-25 PROCEDURE — 85025 COMPLETE CBC W/AUTO DIFF WBC: CPT | Performed by: STUDENT IN AN ORGANIZED HEALTH CARE EDUCATION/TRAINING PROGRAM

## 2023-09-25 PROCEDURE — 83036 HEMOGLOBIN GLYCOSYLATED A1C: CPT | Performed by: STUDENT IN AN ORGANIZED HEALTH CARE EDUCATION/TRAINING PROGRAM

## 2023-09-25 PROCEDURE — 84443 ASSAY THYROID STIM HORMONE: CPT | Performed by: STUDENT IN AN ORGANIZED HEALTH CARE EDUCATION/TRAINING PROGRAM

## 2023-09-25 PROCEDURE — 1160F PR REVIEW ALL MEDS BY PRESCRIBER/CLIN PHARMACIST DOCUMENTED: ICD-10-PCS | Mod: CPTII,S$GLB,, | Performed by: STUDENT IN AN ORGANIZED HEALTH CARE EDUCATION/TRAINING PROGRAM

## 2023-09-25 PROCEDURE — 80061 LIPID PANEL: CPT | Performed by: STUDENT IN AN ORGANIZED HEALTH CARE EDUCATION/TRAINING PROGRAM

## 2023-09-25 PROCEDURE — 3078F PR MOST RECENT DIASTOLIC BLOOD PRESSURE < 80 MM HG: ICD-10-PCS | Mod: CPTII,S$GLB,, | Performed by: STUDENT IN AN ORGANIZED HEALTH CARE EDUCATION/TRAINING PROGRAM

## 2023-09-25 PROCEDURE — 99999 PR PBB SHADOW E&M-EST. PATIENT-LVL IV: CPT | Mod: PBBFAC,,, | Performed by: STUDENT IN AN ORGANIZED HEALTH CARE EDUCATION/TRAINING PROGRAM

## 2023-09-25 PROCEDURE — 84153 ASSAY OF PSA TOTAL: CPT | Performed by: STUDENT IN AN ORGANIZED HEALTH CARE EDUCATION/TRAINING PROGRAM

## 2023-09-25 NOTE — PROGRESS NOTES
SUBJECTIVE     Chief Complaint   Patient presents with    Annual Exam       HPI  Jose Fox is a very pleasant 48 y.o. male with medical diagnoses as listed in the medical history and problem list that presents for annual.Pt has been doing well since our last appointment.      HTN -   Currently prescribed losartan-hctz 100-12.5 daily  Patient endorses taking medication as directed.  Denies side effects or concerns while taking medication.  Patient not currently checking BP at home.  Denies headaches, vision changes, CP, palpitations, or other concerning symptoms.  Due for micro albumin creatinine ratio  Lab Results   Component Value Date    MICALBCREAT 5.8 09/19/2022     BP Readings from Last 3 Encounters:   09/25/23 116/74   06/07/23 (!) 158/101   06/07/23 (!) 147/99     Prediabetes:  Obesity:  BMI 33.06 in clinic today  Gym 3-5 days per week  -Ozempic 1 weekly, plans on stopping   -recent hemoglobin A1c in normal range    Subclinical hyperthyroidism:  -TSH within normal limits on lab performed 9/2022  -Hx US thyroid 10/2021 which showed diffusely enlarged thyroid  Not on medication     Depression:   Wellbutrin 300 mg daily.  Previously on fluoxetine 10 mg daily  Tolerating medication well.  Symptoms stable on current dose.  Denies HI/SI/AVH  Patient denies feeling hopeless, decreased pleasure in doing things, decreased energy, eating changes, or difficulty concentrating.  *Does continue to endorse insomnia and reports feeling very tired during the day as a result.          PAST MEDICAL HISTORY:  Past Medical History:   Diagnosis Date    Gout     Hypertension     Hypothyroidism     Thyroid disease        PAST SURGICAL HISTORY:  Past Surgical History:   Procedure Laterality Date    CARDIAC ELECTROPHYSIOLOGY STUDY AND ABLATION  2010    LEG SURGERY Left     ROBOT-ASSISTED LAPAROSCOPIC REPAIR OF VENTRAL HERNIA N/A 05/11/2023    Procedure: ROBOTIC REPAIR, HERNIA, VENTRAL;  Surgeon: José Miguel Espino MD;  Location:  [facility] OR;  Service: General;  Laterality: N/A;  make second case       SOCIAL HISTORY:  Social History     Socioeconomic History    Marital status:     Number of children: 3   Occupational History    Occupation:       Comment: [employer]   Tobacco Use    Smoking status: Never    Smokeless tobacco: Never   Substance and Sexual Activity    Alcohol use: Never    Drug use: Never    Sexual activity: Yes     Partners: Female     Birth control/protection: None   Social History Narrative    23.18    25      Social Determinants of Health     Financial Resource Strain: Low Risk  (11/9/2022)    Overall Financial Resource Strain (CARDIA)     Difficulty of Paying Living Expenses: Not hard at all   Food Insecurity: No Food Insecurity (11/9/2022)    Hunger Vital Sign     Worried About Running Out of Food in the Last Year: Never true     Ran Out of Food in the Last Year: Never true   Transportation Needs: No Transportation Needs (11/9/2022)    PRAPARE - Transportation     Lack of Transportation (Medical): No     Lack of Transportation (Non-Medical): No   Physical Activity: Sufficiently Active (11/9/2022)    Exercise Vital Sign     Days of Exercise per Week: 3 days     Minutes of Exercise per Session: 80 min   Stress: Stress Concern Present (11/9/2022)    [employer] - Occupational Stress Questionnaire     Feeling of Stress : To some extent   Social Connections: Unknown (11/9/2022)    Social Connection and Isolation Panel [NHANES]     Frequency of Communication with Friends and Family: More than three times a week     Frequency of Social Gatherings with Friends and Family: Never     Active Member of Clubs or Organizations: No     Attends Club or Organization Meetings: Patient refused     Marital Status:    Housing Stability: Unknown (11/9/2022)    Housing Stability Vital Sign     Unable to Pay for Housing in the Last Year: No     Unstable Housing in the Last Year: No        FAMILY HISTORY:  Family History   Problem Relation Age of Onset    Hypertension Mother     Arthritis Mother     Hypertension Father     Diabetes Father     Heart disease Father     Drug abuse Son        ALLERGIES AND MEDICATIONS: updated and reviewed.  Review of patient's allergies indicates:  No Known Allergies  Current Outpatient Medications   Medication Sig Dispense Refill    buPROPion (WELLBUTRIN XL) 300 MG 24 hr tablet Take 1 tablet (300 mg total) by mouth once daily. 30 tablet 11    febuxostat (ULORIC) 40 mg Tab Take 1 tablet (40 mg total) by mouth once daily. 90 tablet 3    indomethacin (INDOCIN) 50 MG capsule Take 1 capsule (50 mg total) by mouth every meal as needed (For acute flare-up of gout). 90 capsule 3    losartan-hydrochlorothiazide 100-12.5 mg (HYZAAR) 100-12.5 mg Tab Take 1 tablet by mouth once daily. 90 tablet 3    semaglutide (OZEMPIC) 1 mg/dose (2 mg/1.5 mL) PnIj Inject 1 mg into the skin every 7 days. 4 each 5    sumatriptan (IMITREX) 50 MG tablet TAKE 1 TABLET (50 MG TOTAL) BY MOUTH EVERY 4 (FOUR) HOURS AS NEEDED FOR MIGRAINE. NOT TO EXCEED 3 TABS PER DAY 12 tablet 1    amLODIPine (NORVASC) 10 MG tablet Take 1 tablet (10 mg total) by mouth once daily. (Patient not taking: Reported on 9/25/2023) 90 tablet 3    diclofenac sodium (VOLTAREN) 1 % Gel Apply 2 g topically 4 (four) times daily. (Patient not taking: Reported on 9/25/2023) 100 g 2    HYDROcodone-acetaminophen (NORCO) 5-325 mg per tablet Take 1 tablet by mouth every 4 (four) hours as needed for Pain. (Patient not taking: Reported on 9/25/2023) 10 tablet 0     No current facility-administered medications for this visit.       ROS  Review of Systems   Constitutional:  Negative for fever and weight loss.   HENT:  Negative for hearing loss.    Eyes:  Negative for discharge.   Respiratory:  Negative for cough, shortness of breath and wheezing.    Cardiovascular:  Negative for chest pain and palpitations.   Gastrointestinal:  Negative  "for abdominal pain, blood in stool, constipation, diarrhea, nausea and vomiting.   Genitourinary:  Negative for dysuria, hematuria and urgency.   Musculoskeletal:  Negative for back pain, joint pain and neck pain.   Skin:  Negative for rash.   Neurological:  Negative for dizziness, weakness and headaches.   Endo/Heme/Allergies:  Negative for polydipsia.   Psychiatric/Behavioral:  Negative for depression. The patient is not nervous/anxious.            OBJECTIVE     Physical Exam  Vitals:    09/25/23 0826   BP: 116/74   Pulse: 77    Body mass index is 33.06 kg/m².  Weight: 116.8 kg (257 lb 8 oz)   Height: 6' 2" (188 cm)     Physical Exam  HENT:      Head: Normocephalic and atraumatic.      Nose: Nose normal.      Mouth/Throat:      Mouth: Mucous membranes are moist.      Pharynx: Oropharynx is clear.   Eyes:      Extraocular Movements: Extraocular movements intact.      Conjunctiva/sclera: Conjunctivae normal.      Pupils: Pupils are equal, round, and reactive to light.   Cardiovascular:      Rate and Rhythm: Normal rate and regular rhythm.   Pulmonary:      Effort: Pulmonary effort is normal.      Breath sounds: Normal breath sounds.   Abdominal:      General: There is no distension.      Palpations: Abdomen is soft.      Tenderness: There is no abdominal tenderness.   Musculoskeletal:         General: No swelling. Normal range of motion.      Cervical back: Normal range of motion.      Right lower leg: No edema.      Left lower leg: No edema.   Skin:     General: Skin is warm.      Findings: No lesion or rash.   Neurological:      General: No focal deficit present.      Mental Status: He is alert and oriented to person, place, and time.      Motor: No weakness.   Psychiatric:         Mood and Affect: Mood normal.         Thought Content: Thought content normal.               ASSESSMENT     48 y.o. male with     1. Annual physical exam    2. Essential hypertension    3. Subclinical hyperthyroidism    4. Hashimoto's " thyroiditis    5. Obesity (BMI 30.0-34.9)    6. Prostate cancer screening    7. Encounter for colorectal cancer screening            PLAN:     1. Annual physical exam  -     PSA, SCREENING; Future; Expected date: 09/25/2023  -     Cologuard Screening (Multitarget Stool DNA); Future; Expected date: 09/25/2023    2. Essential hypertension  Overview:  losartan-hctz 100-12.5 daily  At goal  Stable on medications, continue regimen        3. Subclinical hyperthyroidism  Overview:  -Hx US thyroid 10/2021 which showed diffusely enlarged thyroid  Resolved on recent labs      4. Hashimoto's thyroiditis  Overview:  Not currently on medication      5. Obesity (BMI 30.0-34.9)  Overview:  Ozempic 1 weekly      6. Prostate cancer screening  -     PSA, SCREENING; Future; Expected date: 09/25/2023    7. Encounter for colorectal cancer screening  -     Cologuard Screening (Multitarget Stool DNA); Future; Expected date: 09/25/2023          Follow up in 6 months      No follow-ups on file.

## 2023-11-10 DIAGNOSIS — F32.A DEPRESSION, UNSPECIFIED DEPRESSION TYPE: ICD-10-CM

## 2023-11-10 DIAGNOSIS — M10.071 IDIOPATHIC GOUT INVOLVING TOE OF RIGHT FOOT, UNSPECIFIED CHRONICITY: ICD-10-CM

## 2023-11-10 DIAGNOSIS — I10 ESSENTIAL HYPERTENSION: ICD-10-CM

## 2023-11-10 DIAGNOSIS — M1A.0710 CHRONIC IDIOPATHIC GOUT INVOLVING TOE OF RIGHT FOOT WITHOUT TOPHUS: ICD-10-CM

## 2023-11-10 RX ORDER — AMLODIPINE BESYLATE 10 MG/1
10 TABLET ORAL DAILY
Qty: 90 TABLET | Refills: 3 | Status: SHIPPED | OUTPATIENT
Start: 2023-11-10

## 2023-11-10 RX ORDER — BUPROPION HYDROCHLORIDE 300 MG/1
300 TABLET ORAL DAILY
Qty: 30 TABLET | Refills: 11 | Status: SHIPPED | OUTPATIENT
Start: 2023-11-10 | End: 2024-11-09

## 2023-11-10 RX ORDER — FEBUXOSTAT 40 MG/1
40 TABLET, FILM COATED ORAL DAILY
Qty: 90 TABLET | Refills: 3 | Status: SHIPPED | OUTPATIENT
Start: 2023-11-10

## 2023-11-10 RX ORDER — SUMATRIPTAN 50 MG/1
50 TABLET, FILM COATED ORAL EVERY 4 HOURS PRN
Qty: 12 TABLET | Refills: 1 | Status: SHIPPED | OUTPATIENT
Start: 2023-11-10 | End: 2024-02-12 | Stop reason: SDUPTHER

## 2023-11-10 RX ORDER — LOSARTAN POTASSIUM AND HYDROCHLOROTHIAZIDE 12.5; 1 MG/1; MG/1
1 TABLET ORAL DAILY
Qty: 90 TABLET | Refills: 3 | Status: SHIPPED | OUTPATIENT
Start: 2023-11-10 | End: 2024-01-11 | Stop reason: SDUPTHER

## 2023-12-26 ENCOUNTER — PATIENT MESSAGE (OUTPATIENT)
Dept: PRIMARY CARE CLINIC | Facility: CLINIC | Age: 48
End: 2023-12-26
Payer: COMMERCIAL

## 2023-12-28 DIAGNOSIS — E66.9 OBESITY (BMI 30.0-34.9): Primary | ICD-10-CM

## 2023-12-28 RX ORDER — TIRZEPATIDE 2.5 MG/.5ML
2.5 INJECTION, SOLUTION SUBCUTANEOUS
Qty: 12 PEN | Refills: 1 | Status: SHIPPED | OUTPATIENT
Start: 2023-12-28 | End: 2024-01-24

## 2024-01-06 DIAGNOSIS — M1A.0710 CHRONIC IDIOPATHIC GOUT INVOLVING TOE OF RIGHT FOOT WITHOUT TOPHUS: ICD-10-CM

## 2024-01-06 DIAGNOSIS — M10.9 GOUT, UNSPECIFIED CAUSE, UNSPECIFIED CHRONICITY, UNSPECIFIED SITE: ICD-10-CM

## 2024-01-08 ENCOUNTER — PATIENT MESSAGE (OUTPATIENT)
Dept: PRIMARY CARE CLINIC | Facility: CLINIC | Age: 49
End: 2024-01-08
Payer: COMMERCIAL

## 2024-01-08 DIAGNOSIS — M1A.0710 CHRONIC IDIOPATHIC GOUT INVOLVING TOE OF RIGHT FOOT WITHOUT TOPHUS: ICD-10-CM

## 2024-01-08 DIAGNOSIS — M10.9 GOUT, UNSPECIFIED CAUSE, UNSPECIFIED CHRONICITY, UNSPECIFIED SITE: ICD-10-CM

## 2024-01-08 RX ORDER — INDOMETHACIN 50 MG/1
CAPSULE ORAL
Qty: 90 CAPSULE | Refills: 3 | OUTPATIENT
Start: 2024-01-08

## 2024-01-09 RX ORDER — INDOMETHACIN 50 MG/1
50 CAPSULE ORAL
Qty: 90 CAPSULE | Refills: 3 | OUTPATIENT
Start: 2024-01-09

## 2024-01-11 DIAGNOSIS — I10 ESSENTIAL HYPERTENSION: ICD-10-CM

## 2024-01-11 DIAGNOSIS — M10.9 GOUT, UNSPECIFIED CAUSE, UNSPECIFIED CHRONICITY, UNSPECIFIED SITE: ICD-10-CM

## 2024-01-11 DIAGNOSIS — M1A.0710 CHRONIC IDIOPATHIC GOUT INVOLVING TOE OF RIGHT FOOT WITHOUT TOPHUS: ICD-10-CM

## 2024-01-12 RX ORDER — LOSARTAN POTASSIUM AND HYDROCHLOROTHIAZIDE 12.5; 1 MG/1; MG/1
1 TABLET ORAL DAILY
Qty: 90 TABLET | Refills: 3 | Status: SHIPPED | OUTPATIENT
Start: 2024-01-12 | End: 2024-01-24

## 2024-01-12 RX ORDER — INDOMETHACIN 50 MG/1
50 CAPSULE ORAL
Qty: 90 CAPSULE | Refills: 3 | OUTPATIENT
Start: 2024-01-12

## 2024-01-16 ENCOUNTER — PATIENT MESSAGE (OUTPATIENT)
Dept: PRIMARY CARE CLINIC | Facility: CLINIC | Age: 49
End: 2024-01-16
Payer: COMMERCIAL

## 2024-01-16 ENCOUNTER — TELEPHONE (OUTPATIENT)
Dept: PRIMARY CARE CLINIC | Facility: CLINIC | Age: 49
End: 2024-01-16
Payer: COMMERCIAL

## 2024-01-16 NOTE — TELEPHONE ENCOUNTER
----- Message from Nicole Santos sent at 1/16/2024  2:56 PM CST -----  Contact: [phone]  [email or web address]     Patient is calling for Medical Advice regarding:waiting for this     How long has patient had these symptoms:    Pharmacy name and phone#:  [id number] - [place] - [address] [facility]  [address]  [place]   [place] [address]  Phone: [phone] Fax: [phone]       Would like response via Pageflakeshart:  no     Comments:  Pt states he is needing this and he states they told him they are still awaiting the prior auth for this please advise    tirzepatide (MOUNJARO) 2.5 mg/0.5 mL PnIj

## 2024-01-24 ENCOUNTER — OFFICE VISIT (OUTPATIENT)
Dept: PRIMARY CARE CLINIC | Facility: CLINIC | Age: 49
End: 2024-01-24
Payer: COMMERCIAL

## 2024-01-24 VITALS
BODY MASS INDEX: 33.71 KG/M2 | RESPIRATION RATE: 17 BRPM | DIASTOLIC BLOOD PRESSURE: 90 MMHG | WEIGHT: 262.56 LBS | SYSTOLIC BLOOD PRESSURE: 150 MMHG | OXYGEN SATURATION: 97 % | HEART RATE: 73 BPM

## 2024-01-24 DIAGNOSIS — M79.642 LEFT HAND PAIN: ICD-10-CM

## 2024-01-24 DIAGNOSIS — I10 ESSENTIAL HYPERTENSION: Primary | ICD-10-CM

## 2024-01-24 PROCEDURE — 1159F MED LIST DOCD IN RCRD: CPT | Mod: CPTII,S$GLB,, | Performed by: NURSE PRACTITIONER

## 2024-01-24 PROCEDURE — 3080F DIAST BP >= 90 MM HG: CPT | Mod: CPTII,S$GLB,, | Performed by: NURSE PRACTITIONER

## 2024-01-24 PROCEDURE — 3008F BODY MASS INDEX DOCD: CPT | Mod: CPTII,S$GLB,, | Performed by: NURSE PRACTITIONER

## 2024-01-24 PROCEDURE — 3077F SYST BP >= 140 MM HG: CPT | Mod: CPTII,S$GLB,, | Performed by: NURSE PRACTITIONER

## 2024-01-24 PROCEDURE — 99214 OFFICE O/P EST MOD 30 MIN: CPT | Mod: S$GLB,,, | Performed by: NURSE PRACTITIONER

## 2024-01-24 PROCEDURE — 99999 PR PBB SHADOW E&M-EST. PATIENT-LVL III: CPT | Mod: PBBFAC,,, | Performed by: NURSE PRACTITIONER

## 2024-01-24 PROCEDURE — 1160F RVW MEDS BY RX/DR IN RCRD: CPT | Mod: CPTII,S$GLB,, | Performed by: NURSE PRACTITIONER

## 2024-01-24 RX ORDER — LOSARTAN POTASSIUM AND HYDROCHLOROTHIAZIDE 25; 100 MG/1; MG/1
1 TABLET ORAL DAILY
Qty: 90 TABLET | Refills: 0 | Status: SHIPPED | OUTPATIENT
Start: 2024-01-24 | End: 2024-03-08 | Stop reason: SDUPTHER

## 2024-01-24 RX ORDER — HYDROCHLOROTHIAZIDE 12.5 MG/1
12.5 TABLET ORAL DAILY
Qty: 30 TABLET | Refills: 1 | Status: SHIPPED | OUTPATIENT
Start: 2024-01-24 | End: 2024-01-24 | Stop reason: CLARIF

## 2024-01-24 NOTE — PROGRESS NOTES
"[facility] Note    Chief Complaint      Chief Complaint   Patient presents with    Hypertension    Cyst     Left hand       History of Present Illness      Jose Fox is a 48 y.o. male with chronic conditions of gout, hypertension, hypothyroidism, and thyroid disease, who presents today for: has been having more headaches in the occipital region for 1 month. Constant, all day. Does not wake up in the night. Describes as "annoying." Has not checked BP when it happens. Denies chest pain/sob. No fever or chills. Has tried Ibuprofen, does not really help. Works as . Pt also wants referral to hand surgery to remove cyst.    Htn: on amlodipine, and losartan-hctz. Bp elevated today.    Past Medical History:  Past Medical History:   Diagnosis Date    Gout     Hypertension     Hypothyroidism     Thyroid disease        Past Surgical History:   has a past surgical history that includes Leg Surgery (Left); Cardiac electrophysiology study and ablation (2010); and Robot-assisted laparoscopic repair of ventral hernia (N/A, 05/11/2023).    Family History:  family history includes Arthritis in his mother; Diabetes in his father; Drug abuse in his son; Heart disease in his father; Hypertension in his father and mother.     Social History:  Social History     Tobacco Use    Smoking status: Never    Smokeless tobacco: Never   Substance Use Topics    Alcohol use: Never    Drug use: Never       Review of Systems   Constitutional:  Negative for chills and fever.   Respiratory:  Negative for cough and shortness of breath.    Cardiovascular:  Negative for chest pain and palpitations.   Gastrointestinal:  Negative for constipation, diarrhea, nausea and vomiting.   Genitourinary:  Negative for dysuria and hematuria.   Musculoskeletal:  Positive for joint pain (left hand pain). Negative for falls.   Neurological:  Positive for headaches.        Medications:  Outpatient Encounter Medications as of 1/24/2024   Medication " Sig Dispense Refill    amLODIPine (NORVASC) 10 MG tablet Take 1 tablet (10 mg total) by mouth once daily. 90 tablet 3    buPROPion (WELLBUTRIN XL) 300 MG 24 hr tablet Take 1 tablet (300 mg total) by mouth once daily. 30 tablet 11    diclofenac sodium (VOLTAREN) 1 % Gel Apply 2 g topically 4 (four) times daily. 100 g 2    febuxostat (ULORIC) 40 mg Tab Take 1 tablet (40 mg total) by mouth once daily. 90 tablet 3    indomethacin (INDOCIN) 50 MG capsule Take 1 capsule (50 mg total) by mouth every meal as needed (For acute flare-up of gout). 90 capsule 3    sumatriptan (IMITREX) 50 MG tablet Take 1 tablet (50 mg total) by mouth every 4 (four) hours as needed for Migraine. Not to exceed 3 tabs per day 12 tablet 1    [DISCONTINUED] losartan-hydrochlorothiazide 100-12.5 mg (HYZAAR) 100-12.5 mg Tab Take 1 tablet by mouth once daily. 90 tablet 3    HYDROcodone-acetaminophen (NORCO) 5-325 mg per tablet Take 1 tablet by mouth every 4 (four) hours as needed for Pain. (Patient not taking: Reported on 9/25/2023) 10 tablet 0    losartan-hydrochlorothiazide 100-25 mg (HYZAAR) 100-25 mg per tablet Take 1 tablet by mouth once daily. 90 tablet 0    semaglutide (OZEMPIC) 1 mg/dose (2 mg/1.5 mL) PnIj Inject 1 mg into the skin every 7 days. 4 each 5    tirzepatide (MOUNJARO) 2.5 mg/0.5 mL PnIj Inject 2.5 mg into the skin every 7 days. (Patient not taking: Reported on 1/24/2024) 12 pen 1    [DISCONTINUED] hydroCHLOROthiazide (HYDRODIURIL) 12.5 MG Tab Take 1 tablet (12.5 mg total) by mouth once daily. 30 tablet 1     No facility-administered encounter medications on file as of 1/24/2024.       Allergies:  Review of patient's allergies indicates:  No Known Allergies    Health Maintenance:  Immunization History   Administered Date(s) Administered    COVID-19 MRNA, LN-S PF (MODERNA HALF 0.25 ML DOSE) 12/20/2021    COVID-19, MRNA, LN-S, PF (MODERNA FULL 0.5 ML DOSE) 03/29/2021, 04/27/2021    COVID-19, mRNA, LNP-S, bivalent booster, PF (PFIZER  OMICRON) 09/26/2022    Influenza 10/01/2019    Influenza - Quadrivalent - MDCK 10/10/2019    Influenza - Quadrivalent - PF *Preferred* (6 months and older) 10/29/2021, 09/26/2022      Health Maintenance   Topic Date Due    TETANUS VACCINE  Never done    Colorectal Cancer Screening  02/03/2023    Shingles Vaccine (1 of 2) Never done    Lipid Panel  09/25/2024    Hepatitis C Screening  Completed        Physical Exam      Vital Signs  Pulse: 73  Resp: 17  SpO2: 97 %  BP: (!) 150/90  BP Location: Right arm  Patient Position: Sitting  Height and Weight  Weight: 119.1 kg (262 lb 9.1 oz)]    Physical Exam  Constitutional:       Appearance: He is well-developed.   HENT:      Head: Normocephalic and atraumatic.      Right Ear: Tympanic membrane normal.      Left Ear: Tympanic membrane normal.      Mouth/Throat:      Mouth: Mucous membranes are moist.   Eyes:      Pupils: Pupils are equal, round, and reactive to light.   Neck:      Thyroid: No thyromegaly.   Cardiovascular:      Rate and Rhythm: Normal rate and regular rhythm.      Heart sounds: No murmur heard.  Pulmonary:      Effort: Pulmonary effort is normal. No respiratory distress.      Breath sounds: Normal breath sounds.   Abdominal:      General: There is no distension.      Palpations: Abdomen is soft.      Tenderness: There is no abdominal tenderness.   Skin:     General: Skin is warm and dry.   Neurological:      Mental Status: He is alert and oriented to person, place, and time.   Psychiatric:         Behavior: Behavior normal.          Laboratory:  CBC:  Recent Labs   Lab 09/26/22  0936 03/02/23  0939 09/25/23  0915   WBC 5.88 7.28 6.44   RBC 4.91 5.11 5.06   Hemoglobin 14.4 15.3 15.4   Hematocrit 43.7 46.5 46.2   Platelets 241 201 219   MCV 89 91 91   MCH 29.3 29.9 30.4   MCHC 33.0 32.9 33.3     CMP:  Recent Labs   Lab 09/26/22  0936 03/02/23  0939 09/25/23  0915   Glucose 123 H 108 137 H   Calcium 9.2 10.0 9.9   Albumin 3.7 4.1 4.1   Total Protein 6.9 7.4 7.6    Sodium 141 141 142   Potassium 4.2 4.2 4.4   CO2 24 27 26   Chloride 110 109 109   BUN 19 22 H 18   Alkaline Phosphatase 52 L 56 51 L   ALT 20 30 40   AST 21 22 29   Total Bilirubin 0.7 0.7 0.8     URINALYSIS:  Recent Labs   Lab 10/29/21  0758   Color, UA Yellow   Spec Grav UA 1.020   pH, UA 5   Nitrite, UA negative   Urobilinogen, UA normal      LIPIDS:  Recent Labs   Lab 09/26/22  0936 03/02/23  0939 09/25/23  0915   TSH 0.986 1.278 1.216   HDL 34 L 30 L 35 L   Cholesterol 127 142 166   Triglycerides 52 55 75   LDL Cholesterol 82.6 101.0 116.0   HDL/Cholesterol Ratio 26.8 21.1 21.1   Non-HDL Cholesterol 93 112 131   Total Cholesterol/HDL Ratio 3.7 4.7 4.7     TSH:  Recent Labs   Lab 09/26/22  0936 03/02/23  0939 09/25/23  0915   TSH 0.986 1.278 1.216     A1C:  Recent Labs   Lab 10/29/21  0847 03/31/22  0703 09/26/22  0936 03/02/23  0939 09/25/23  0915   Hemoglobin A1C 6.3 H 5.9 H 5.5 5.5 5.5       Assessment/Plan     Jose Fox is a 48 y.o.male with:    1. Essential hypertension  - losartan-hydrochlorothiazide 100-25 mg (HYZAAR) 100-25 mg per tablet; Take 1 tablet by mouth once daily.  Dispense: 90 tablet; Refill: 0  - will increase and recheck bp in 2 weeks  -continue Amlodipine   - stay hydrated  - encourage to monitor BP at home and bring log in 2 weeks.  -will see if help improve headaches, if not discussed NSAID and referral to Neurology.    2. Left hand pain  Referred to hand surgery, known to have Ganglion cyst, just wants removed.    Chronic conditions status updated as per HPI.  Other than changes above, cont current medications and maintain follow up with specialists.  Follow up in about 2 weeks (around 2/7/2024).    No future appointments.      Adreinne Cotaya, FNP [facility]

## 2024-01-30 ENCOUNTER — PATIENT MESSAGE (OUTPATIENT)
Dept: PRIMARY CARE CLINIC | Facility: CLINIC | Age: 49
End: 2024-01-30
Payer: COMMERCIAL

## 2024-01-30 DIAGNOSIS — R73.03 PREDIABETES: ICD-10-CM

## 2024-01-30 DIAGNOSIS — E66.9 OBESITY (BMI 30.0-34.9): ICD-10-CM

## 2024-01-31 ENCOUNTER — TELEPHONE (OUTPATIENT)
Dept: ORTHOPEDICS | Facility: CLINIC | Age: 49
End: 2024-01-31
Payer: COMMERCIAL

## 2024-01-31 DIAGNOSIS — R73.03 PREDIABETES: Primary | ICD-10-CM

## 2024-01-31 DIAGNOSIS — E66.9 OBESITY (BMI 30.0-34.9): ICD-10-CM

## 2024-01-31 DIAGNOSIS — R52 PAIN: Primary | ICD-10-CM

## 2024-01-31 RX ORDER — TIRZEPATIDE 2.5 MG/.5ML
2.5 INJECTION, SOLUTION SUBCUTANEOUS
Qty: 12 PEN | Refills: 1 | Status: SHIPPED | OUTPATIENT
Start: 2024-01-31 | End: 2024-02-15 | Stop reason: SDUPTHER

## 2024-02-01 NOTE — TELEPHONE ENCOUNTER
Prior Auth ([facility]) ID: [id number] Drug/Service Name: MOUNJARO 2.5 MG/0.5 ML PEN   Patient: BRUNILDA KEEN Date Requested: 2024 9:27:39 AM     MemberID: [id number] : 1975

## 2024-02-09 ENCOUNTER — DOCUMENTATION ONLY (OUTPATIENT)
Dept: ORTHOPEDICS | Facility: CLINIC | Age: 49
End: 2024-02-09
Payer: COMMERCIAL

## 2024-02-09 DIAGNOSIS — M10.9 GOUT, UNSPECIFIED CAUSE, UNSPECIFIED CHRONICITY, UNSPECIFIED SITE: ICD-10-CM

## 2024-02-09 DIAGNOSIS — M1A.0710 CHRONIC IDIOPATHIC GOUT INVOLVING TOE OF RIGHT FOOT WITHOUT TOPHUS: ICD-10-CM

## 2024-02-09 RX ORDER — INDOMETHACIN 50 MG/1
50 CAPSULE ORAL
Qty: 90 CAPSULE | Refills: 3 | Status: SHIPPED | OUTPATIENT
Start: 2024-02-09 | End: 2024-04-10 | Stop reason: SDUPTHER

## 2024-02-12 RX ORDER — SUMATRIPTAN 50 MG/1
50 TABLET, FILM COATED ORAL EVERY 4 HOURS PRN
Qty: 12 TABLET | Refills: 1 | Status: SHIPPED | OUTPATIENT
Start: 2024-02-12 | End: 2024-05-14 | Stop reason: SDUPTHER

## 2024-02-15 ENCOUNTER — PATIENT MESSAGE (OUTPATIENT)
Dept: PRIMARY CARE CLINIC | Facility: CLINIC | Age: 49
End: 2024-02-15
Payer: COMMERCIAL

## 2024-02-15 DIAGNOSIS — R73.03 PREDIABETES: ICD-10-CM

## 2024-02-15 DIAGNOSIS — E66.9 OBESITY (BMI 30.0-34.9): ICD-10-CM

## 2024-02-15 RX ORDER — TIRZEPATIDE 2.5 MG/.5ML
2.5 INJECTION, SOLUTION SUBCUTANEOUS
Qty: 12 PEN | Refills: 1 | Status: SHIPPED | OUTPATIENT
Start: 2024-02-15 | End: 2024-08-13

## 2024-02-20 ENCOUNTER — TELEPHONE (OUTPATIENT)
Dept: ORTHOPEDICS | Facility: CLINIC | Age: 49
End: 2024-02-20
Payer: COMMERCIAL

## 2024-02-22 ENCOUNTER — PATIENT MESSAGE (OUTPATIENT)
Dept: PRIMARY CARE CLINIC | Facility: CLINIC | Age: 49
End: 2024-02-22
Payer: COMMERCIAL

## 2024-03-01 ENCOUNTER — TELEPHONE (OUTPATIENT)
Dept: ORTHOPEDICS | Facility: CLINIC | Age: 49
End: 2024-03-01
Payer: COMMERCIAL

## 2024-03-04 ENCOUNTER — OFFICE VISIT (OUTPATIENT)
Dept: ORTHOPEDICS | Facility: CLINIC | Age: 49
End: 2024-03-04
Attending: STUDENT IN AN ORGANIZED HEALTH CARE EDUCATION/TRAINING PROGRAM
Payer: COMMERCIAL

## 2024-03-04 ENCOUNTER — HOSPITAL ENCOUNTER (OUTPATIENT)
Dept: RADIOLOGY | Facility: OTHER | Age: 49
Discharge: HOME | End: 2024-03-04
Attending: STUDENT IN AN ORGANIZED HEALTH CARE EDUCATION/TRAINING PROGRAM
Payer: COMMERCIAL

## 2024-03-04 VITALS
BODY MASS INDEX: 33.7 KG/M2 | WEIGHT: 262.56 LBS | SYSTOLIC BLOOD PRESSURE: 146 MMHG | HEIGHT: 74 IN | DIASTOLIC BLOOD PRESSURE: 90 MMHG | OXYGEN SATURATION: 69 %

## 2024-03-04 DIAGNOSIS — M25.832 MASS OF JOINT OF LEFT WRIST: Primary | ICD-10-CM

## 2024-03-04 DIAGNOSIS — R52 PAIN: ICD-10-CM

## 2024-03-04 PROCEDURE — 3008F BODY MASS INDEX DOCD: CPT | Mod: CPTII,S$GLB,, | Performed by: SPECIALIST/TECHNOLOGIST

## 2024-03-04 PROCEDURE — 99999 PR PBB SHADOW E&M-EST. PATIENT-LVL III: CPT | Mod: PBBFAC,,, | Performed by: SPECIALIST/TECHNOLOGIST

## 2024-03-04 PROCEDURE — 99204 OFFICE O/P NEW MOD 45 MIN: CPT | Mod: S$GLB,,, | Performed by: SPECIALIST/TECHNOLOGIST

## 2024-03-04 PROCEDURE — 73130 X-RAY EXAM OF HAND: CPT | Mod: 26,LT,, | Performed by: RADIOLOGY

## 2024-03-04 PROCEDURE — 3080F DIAST BP >= 90 MM HG: CPT | Mod: CPTII,S$GLB,, | Performed by: SPECIALIST/TECHNOLOGIST

## 2024-03-04 PROCEDURE — 1159F MED LIST DOCD IN RCRD: CPT | Mod: CPTII,S$GLB,, | Performed by: SPECIALIST/TECHNOLOGIST

## 2024-03-04 PROCEDURE — 73130 X-RAY EXAM OF HAND: CPT | Mod: TC,FY,LT

## 2024-03-04 PROCEDURE — 3077F SYST BP >= 140 MM HG: CPT | Mod: CPTII,S$GLB,, | Performed by: SPECIALIST/TECHNOLOGIST

## 2024-03-04 NOTE — PROGRESS NOTES
Subjective:       Patient ID: Jose Fox is a 49 y.o. male.    Chief Complaint: Pain of the Left Hand and Pain      HPI  3/4/24  Patient reports clinic today with left hand mass present on the dorsum of the hand that is about 3 cm by 3 cm.  He states it has been present for the past 6 months.  She notes it is bothersome when he is doing any weightlifting activities where his wrist is in extension.  He denies any numbness or tingling.  He denies any previous trauma or surgeries to the wrist or hand. He has not tried bracing throughout the day.     Patient states he works as an  at the [employer]      Past Medical History:   Diagnosis Date    Gout     Hypertension     Hypothyroidism     Thyroid disease      Past Surgical History:   Procedure Laterality Date    CARDIAC ELECTROPHYSIOLOGY STUDY AND ABLATION  2010    LEG SURGERY Left     ROBOT-ASSISTED LAPAROSCOPIC REPAIR OF VENTRAL HERNIA N/A 05/11/2023    Procedure: ROBOTIC REPAIR, HERNIA, VENTRAL;  Surgeon: José Miguel Espino MD;  Location: [facility];  Service: General;  Laterality: N/A;  make second case     Family History   Problem Relation Age of Onset    Hypertension Mother     Arthritis Mother     Hypertension Father     Diabetes Father     Heart disease Father     Drug abuse Son      Social History     Socioeconomic History    Marital status:     Number of children: 3   Occupational History    Occupation: [employer]      Comment: Formerly [employer]   Tobacco Use    Smoking status: Never    Smokeless tobacco: Never   Substance and Sexual Activity    Alcohol use: Never    Drug use: Never    Sexual activity: Yes     Partners: Female     Birth control/protection: None   Social History Narrative    23.18    25      Social Determinants of Health     Financial Resource Strain: Low Risk  (11/9/2022)    Overall Financial Resource Strain (CARDIA)     Difficulty of Paying Living Expenses: Not hard at all   Food Insecurity: No Food Insecurity  (11/9/2022)    Hunger Vital Sign     Worried About Running Out of Food in the Last Year: Never true     Ran Out of Food in the Last Year: Never true   Transportation Needs: No Transportation Needs (11/9/2022)    PRAPARE - Transportation     Lack of Transportation (Medical): No     Lack of Transportation (Non-Medical): No   Physical Activity: Sufficiently Active (11/9/2022)    Exercise Vital Sign     Days of Exercise per Week: 3 days     Minutes of Exercise per Session: 80 min   Stress: Stress Concern Present (11/9/2022)    [employer] [place] [employer] - Occupational Stress Questionnaire     Feeling of Stress : To some extent   Social Connections: Unknown (11/9/2022)    Social Connection and Isolation Panel [NHANES]     Frequency of Communication with Friends and Family: More than three times a week     Frequency of Social Gatherings with Friends and Family: Never     Active Member of Clubs or Organizations: No     Attends Club or Organization Meetings: Patient declined     Marital Status:    Housing Stability: Unknown (11/9/2022)    Housing Stability Vital Sign     Unable to Pay for Housing in the Last Year: No     Unstable Housing in the Last Year: No       Current Outpatient Medications   Medication Sig Dispense Refill    amLODIPine (NORVASC) 10 MG tablet Take 1 tablet (10 mg total) by mouth once daily. 90 tablet 3    buPROPion (WELLBUTRIN XL) 300 MG 24 hr tablet Take 1 tablet (300 mg total) by mouth once daily. 30 tablet 11    diclofenac sodium (VOLTAREN) 1 % Gel Apply 2 g topically 4 (four) times daily. 100 g 2    febuxostat (ULORIC) 40 mg Tab Take 1 tablet (40 mg total) by mouth once daily. 90 tablet 3    indomethacin (INDOCIN) 50 MG capsule Take 1 capsule (50 mg total) by mouth every meal as needed (For acute flare-up of gout). 90 capsule 3    losartan-hydrochlorothiazide 100-25 mg (HYZAAR) 100-25 mg per tablet Take 1 tablet by mouth once daily. 90 tablet 0    semaglutide (OZEMPIC) 1 mg/dose  "(2 mg/1.5 mL) PnIj Inject 1 mg into the skin every 7 days. 4 each 5    sumatriptan (IMITREX) 50 MG tablet Take 1 tablet (50 mg total) by mouth every 4 (four) hours as needed for Migraine. Not to exceed 3 tabs per day 12 tablet 1    tirzepatide (MOUNJARO) 2.5 mg/0.5 mL PnIj Inject 2.5 mg into the skin every 7 days. 12 pen 1     No current facility-administered medications for this visit.     Review of patient's allergies indicates:  No Known Allergies    Review of Systems        Objective:      Vitals:    03/04/24 1031   BP: (!) 146/90   SpO2: (!) 69%   Weight: 119.1 kg (262 lb 9.1 oz)   Height: 6' 2" (1.88 m)     Physical Exam  Cardiovascular:      Pulses:           Radial pulses are Normal on the right side and Normal on the left side.       Hand/Wrist Musculoskeletal Exam    Inspection    Right      Erythema: none      Ecchymosis: none      Edema: none      Deformity: none    Left      Erythema: none      Ecchymosis: none      Edema: none      Deformity: none    Inspection additional comments: 3 x 3 cm mass present on the dorsum of the wrist.    Palpation    Palpation additional comments: Soft mobile mass present the dorsum of the wrist measuring 3 x 3 cm that is mildly tender    Range of Motion    Right Hand      Right hand range of motion is normal.      Left Hand      Left hand range of motion is normal.       Right Wrist      Right wrist range of motion is normal.      Left Wrist      Left wrist range of motion is normal.      Neurovascular    Right       Radial pulse: normal      Capillary refill: <3 sec and brisk      Ulnar nerve sensory distribution: normal      Median nerve sensory distribution: normal      Superficial radial nerve sensory distribution: normal    Left       Radial pulse: normal      Capillary refill: brisk and <3 sec      Ulnar nerve sensory distribution: normal      Median nerve sensory distribution: normal      Superficial radial nerve sensory distribution: normal      Diagnostics " Review: X-Ray: Reviewed  Personal interpretation of the x-rays no signs of fractures or dislocations       Assessment:       1. Mass of joint of left wrist        Plan:       Treatment options discussed with the patient include further imaging and surgery.  At this time we will order a ultrasound to further evaluate the mass and have him follow up with Dr. Borja for surgical consideration.

## 2024-03-06 ENCOUNTER — PATIENT MESSAGE (OUTPATIENT)
Dept: PRIMARY CARE CLINIC | Facility: CLINIC | Age: 49
End: 2024-03-06
Payer: COMMERCIAL

## 2024-03-07 ENCOUNTER — OFFICE VISIT (OUTPATIENT)
Dept: PRIMARY CARE CLINIC | Facility: CLINIC | Age: 49
End: 2024-03-07
Payer: COMMERCIAL

## 2024-03-07 VITALS — SYSTOLIC BLOOD PRESSURE: 126 MMHG | DIASTOLIC BLOOD PRESSURE: 73 MMHG

## 2024-03-07 DIAGNOSIS — R93.6 ABNORMAL X-RAY OF HAND: Primary | ICD-10-CM

## 2024-03-07 PROCEDURE — 1159F MED LIST DOCD IN RCRD: CPT | Mod: CPTII,95,, | Performed by: STUDENT IN AN ORGANIZED HEALTH CARE EDUCATION/TRAINING PROGRAM

## 2024-03-07 PROCEDURE — 99214 OFFICE O/P EST MOD 30 MIN: CPT | Mod: 95,,, | Performed by: STUDENT IN AN ORGANIZED HEALTH CARE EDUCATION/TRAINING PROGRAM

## 2024-03-07 PROCEDURE — 1160F RVW MEDS BY RX/DR IN RCRD: CPT | Mod: CPTII,95,, | Performed by: STUDENT IN AN ORGANIZED HEALTH CARE EDUCATION/TRAINING PROGRAM

## 2024-03-07 NOTE — PROGRESS NOTES
Subjective:       Patient ID: Jose Fox is a 49 y.o. male.    Chief Complaint:  Abnormal x-ray follow-up      The patient location is: [place], [place]  The chief complaint leading to consultation is: [place], [place]    Visit type: audiovisual    Face to Face time with patient: 10 minutes  15 minutes of total time spent on the encounter, which includes face to face time and non-face to face time preparing to see the patient (eg, review of tests), Obtaining and/or reviewing separately obtained history, Documenting clinical information in the electronic or other health record, Independently interpreting results (not separately reported) and communicating results to the patient/family/caregiver, or Care coordination (not separately reported).         Each patient to whom he or she provides medical services by telemedicine is:  (1) informed of the relationship between the physician and patient and the respective role of any other health care provider with respect to management of the patient; and (2) notified that he or she may decline to receive medical services by telemedicine and may withdraw from such care at any time.    Notes:     HPI    Patient presents today for virtual visit to discuss recent abnormal x-ray of his left hand.  Has chronic pain of left hand.  No specific injuries.  Does work as a .  X-ray performed 03/04/2024 showed subchondral lucencies of the 2nd and 5th MCP joints.  No personal history of inflammatory arthritis.  Does note a family history of mom with arthritis of the hands.  Unknown what type.    Review of Systems   Constitutional:  Negative for activity change and unexpected weight change.   HENT:  Negative for hearing loss, rhinorrhea and trouble swallowing.    Eyes:  Negative for discharge and visual disturbance.   Respiratory:  Negative for chest tightness and wheezing.    Cardiovascular:  Negative for chest pain and palpitations.   Gastrointestinal:  Negative for blood in stool,  constipation, diarrhea and vomiting.   Endocrine: Negative for polydipsia and polyuria.   Genitourinary:  Negative for difficulty urinating, hematuria and urgency.   Musculoskeletal:  Positive for arthralgias. Negative for joint swelling and neck pain.   Neurological:  Positive for headaches. Negative for weakness.   Psychiatric/Behavioral:  Negative for confusion and dysphoric mood.          Objective:      Physical Exam  HENT:      Head: Normocephalic and atraumatic.      Nose: Nose normal.      Mouth/Throat:      Mouth: Mucous membranes are moist.      Pharynx: Oropharynx is clear.   Eyes:      Extraocular Movements: Extraocular movements intact.      Conjunctiva/sclera: Conjunctivae normal.      Pupils: Pupils are equal, round, and reactive to light.   Pulmonary:      Effort: Pulmonary effort is normal.   Musculoskeletal:         General: No swelling. Normal range of motion.      Cervical back: Normal range of motion.   Skin:     Findings: No lesion or rash.   Neurological:      General: No focal deficit present.      Mental Status: He is alert and oriented to person, place, and time.      Motor: No weakness.         Assessment:       Problem List Items Addressed This Visit    None  Visit Diagnoses       Abnormal x-ray of hand    -  Primary    Relevant Orders    C-Reactive Protein    Cyclic Citrullinated Peptide Antibody, IgG    Rheumatoid Factor    Sedimentation rate    GARY    MRI Hand Fingers W WO Contrast Left            Plan:       Diagnoses and all orders for this visit:    Abnormal x-ray of hand  -     C-Reactive Protein; Future  -     Cyclic Citrullinated Peptide Antibody, IgG; Future  -     Rheumatoid Factor; Future  -     Sedimentation rate; Future  -     GARY; Future  -     MRI Hand Fingers W WO Contrast Left; Future         Concern for possible rheumatoid arthritis.  Rheumatologic panel ordered, follow-up  MRI of the hand ordered for further evaluation.

## 2024-03-08 ENCOUNTER — TELEPHONE (OUTPATIENT)
Dept: ORTHOPEDICS | Facility: CLINIC | Age: 49
End: 2024-03-08
Payer: COMMERCIAL

## 2024-03-08 ENCOUNTER — LAB VISIT (OUTPATIENT)
Dept: LAB | Facility: HOSPITAL | Age: 49
End: 2024-03-08
Attending: STUDENT IN AN ORGANIZED HEALTH CARE EDUCATION/TRAINING PROGRAM
Payer: COMMERCIAL

## 2024-03-08 DIAGNOSIS — I10 ESSENTIAL HYPERTENSION: ICD-10-CM

## 2024-03-08 DIAGNOSIS — R93.6 ABNORMAL X-RAY OF HAND: ICD-10-CM

## 2024-03-08 LAB
CCP AB SER IA-ACNC: <0.5 U/ML
CRP SERPL-MCNC: 5.5 MG/L (ref 0–8.2)
ERYTHROCYTE [SEDIMENTATION RATE] IN BLOOD BY PHOTOMETRIC METHOD: 10 MM/HR (ref 0–23)
RHEUMATOID FACT SERPL-ACNC: <13 IU/ML (ref 0–15)

## 2024-03-08 PROCEDURE — 86200 CCP ANTIBODY: CPT | Performed by: STUDENT IN AN ORGANIZED HEALTH CARE EDUCATION/TRAINING PROGRAM

## 2024-03-08 PROCEDURE — 86431 RHEUMATOID FACTOR QUANT: CPT | Performed by: STUDENT IN AN ORGANIZED HEALTH CARE EDUCATION/TRAINING PROGRAM

## 2024-03-08 PROCEDURE — 86038 ANTINUCLEAR ANTIBODIES: CPT | Performed by: STUDENT IN AN ORGANIZED HEALTH CARE EDUCATION/TRAINING PROGRAM

## 2024-03-08 PROCEDURE — 86140 C-REACTIVE PROTEIN: CPT | Performed by: STUDENT IN AN ORGANIZED HEALTH CARE EDUCATION/TRAINING PROGRAM

## 2024-03-08 PROCEDURE — 85652 RBC SED RATE AUTOMATED: CPT | Performed by: STUDENT IN AN ORGANIZED HEALTH CARE EDUCATION/TRAINING PROGRAM

## 2024-03-08 PROCEDURE — 36415 COLL VENOUS BLD VENIPUNCTURE: CPT | Performed by: STUDENT IN AN ORGANIZED HEALTH CARE EDUCATION/TRAINING PROGRAM

## 2024-03-08 NOTE — TELEPHONE ENCOUNTER
Spoke to patient and rescheduled his appt to a virtual appointment on 3/21----- Message from Carli Monk sent at 3/8/2024  2:01 PM CST -----  Regarding: virtual appt request  Contact: [phone]  Pt is calling. Pt is wondering if his appt on Tuesday can be done virtually instead of in person. Please let the pt know and give him a call back

## 2024-03-09 RX ORDER — LOSARTAN POTASSIUM AND HYDROCHLOROTHIAZIDE 25; 100 MG/1; MG/1
1 TABLET ORAL DAILY
Qty: 90 TABLET | Refills: 3 | Status: SHIPPED | OUTPATIENT
Start: 2024-03-09 | End: 2024-04-10 | Stop reason: SDUPTHER

## 2024-03-11 ENCOUNTER — HOSPITAL ENCOUNTER (OUTPATIENT)
Dept: RADIOLOGY | Facility: OTHER | Age: 49
Discharge: HOME | End: 2024-03-11
Attending: SPECIALIST/TECHNOLOGIST
Payer: COMMERCIAL

## 2024-03-11 DIAGNOSIS — M25.832 MASS OF JOINT OF LEFT WRIST: ICD-10-CM

## 2024-03-11 LAB — ANA SER QL IF: NORMAL

## 2024-03-11 PROCEDURE — 76882 US LMTD JT/FCL EVL NVASC XTR: CPT | Mod: TC,LT

## 2024-03-11 PROCEDURE — 76882 US LMTD JT/FCL EVL NVASC XTR: CPT | Mod: 26,LT,, | Performed by: RADIOLOGY

## 2024-03-13 ENCOUNTER — TELEPHONE (OUTPATIENT)
Dept: ORTHOPEDICS | Facility: CLINIC | Age: 49
End: 2024-03-13
Payer: COMMERCIAL

## 2024-03-13 NOTE — TELEPHONE ENCOUNTER
I called the patient and left a voicemail reminding them of their appointment on 03/21/2024 at 11:30 a.m. I informed them where they need to go and to arrive 15 minutes before their appointment.

## 2024-03-28 ENCOUNTER — TELEPHONE (OUTPATIENT)
Dept: ORTHOPEDICS | Facility: CLINIC | Age: 49
End: 2024-03-28
Payer: COMMERCIAL

## 2024-04-10 DIAGNOSIS — I10 ESSENTIAL HYPERTENSION: ICD-10-CM

## 2024-04-10 DIAGNOSIS — M1A.0710 CHRONIC IDIOPATHIC GOUT INVOLVING TOE OF RIGHT FOOT WITHOUT TOPHUS: ICD-10-CM

## 2024-04-10 DIAGNOSIS — M10.071 IDIOPATHIC GOUT INVOLVING TOE OF RIGHT FOOT, UNSPECIFIED CHRONICITY: ICD-10-CM

## 2024-04-10 DIAGNOSIS — M10.9 GOUT, UNSPECIFIED CAUSE, UNSPECIFIED CHRONICITY, UNSPECIFIED SITE: ICD-10-CM

## 2024-04-10 DIAGNOSIS — F32.A DEPRESSION, UNSPECIFIED DEPRESSION TYPE: ICD-10-CM

## 2024-04-10 RX ORDER — AMLODIPINE BESYLATE 10 MG/1
10 TABLET ORAL DAILY
Qty: 90 TABLET | Refills: 3 | Status: SHIPPED | OUTPATIENT
Start: 2024-04-10

## 2024-04-10 NOTE — TELEPHONE ENCOUNTER
No care due was identified.  [facility] Embedded Care Due Messages. Reference number: [id number].   4/10/2024 2:08:04 PM CDT

## 2024-04-10 NOTE — TELEPHONE ENCOUNTER
Care Due:                  Date            Visit Type   Department     Provider  --------------------------------------------------------------------------------                                ESTABLISHED                              PATIENT -    [facility]  Last Visit: 03-      [facility]           Mari García  Next Visit: None Scheduled  None         None Found                                                            Last  Test          Frequency    Reason                     Performed    Due Date  --------------------------------------------------------------------------------    HBA1C.......  6 months...  semaglutide, tirzepatide.  09- 03-    Uric Acid...  12 months..  febuxostat...............  Not Found    Overdue    [employer] Embedded Care Due Messages. Reference number: [id number].   4/10/2024 2:07:33 PM CDT

## 2024-04-11 RX ORDER — INDOMETHACIN 50 MG/1
50 CAPSULE ORAL
Qty: 90 CAPSULE | Refills: 0 | Status: SHIPPED | OUTPATIENT
Start: 2024-04-11 | End: 2024-05-14 | Stop reason: SDUPTHER

## 2024-04-11 RX ORDER — BUPROPION HYDROCHLORIDE 300 MG/1
300 TABLET ORAL DAILY
Qty: 90 TABLET | Refills: 3 | Status: SHIPPED | OUTPATIENT
Start: 2024-04-11 | End: 2024-05-14 | Stop reason: SDUPTHER

## 2024-04-11 RX ORDER — LOSARTAN POTASSIUM AND HYDROCHLOROTHIAZIDE 25; 100 MG/1; MG/1
1 TABLET ORAL DAILY
Qty: 90 TABLET | Refills: 3 | Status: SHIPPED | OUTPATIENT
Start: 2024-04-11 | End: 2024-05-14 | Stop reason: SDUPTHER

## 2024-04-11 RX ORDER — FEBUXOSTAT 40 MG/1
40 TABLET, FILM COATED ORAL DAILY
Qty: 90 TABLET | Refills: 3 | Status: SHIPPED | OUTPATIENT
Start: 2024-04-11 | End: 2024-05-14 | Stop reason: SDUPTHER

## 2024-04-25 ENCOUNTER — OFFICE VISIT (OUTPATIENT)
Dept: PRIMARY CARE CLINIC | Facility: CLINIC | Age: 49
End: 2024-04-25
Payer: COMMERCIAL

## 2024-04-25 ENCOUNTER — TELEPHONE (OUTPATIENT)
Dept: ORTHOPEDICS | Facility: CLINIC | Age: 49
End: 2024-04-25
Payer: COMMERCIAL

## 2024-04-25 ENCOUNTER — LAB VISIT (OUTPATIENT)
Dept: LAB | Facility: HOSPITAL | Age: 49
End: 2024-04-25
Attending: STUDENT IN AN ORGANIZED HEALTH CARE EDUCATION/TRAINING PROGRAM
Payer: COMMERCIAL

## 2024-04-25 VITALS
OXYGEN SATURATION: 98 % | SYSTOLIC BLOOD PRESSURE: 135 MMHG | BODY MASS INDEX: 33.12 KG/M2 | DIASTOLIC BLOOD PRESSURE: 80 MMHG | WEIGHT: 257.94 LBS | HEART RATE: 79 BPM

## 2024-04-25 DIAGNOSIS — N52.9 ERECTILE DYSFUNCTION, UNSPECIFIED ERECTILE DYSFUNCTION TYPE: ICD-10-CM

## 2024-04-25 DIAGNOSIS — R20.2 NUMBNESS AND TINGLING IN RIGHT HAND: ICD-10-CM

## 2024-04-25 DIAGNOSIS — R20.0 NUMBNESS AND TINGLING IN RIGHT HAND: ICD-10-CM

## 2024-04-25 DIAGNOSIS — G56.01 CARPAL TUNNEL SYNDROME OF RIGHT WRIST: Primary | ICD-10-CM

## 2024-04-25 LAB
25(OH)D3+25(OH)D2 SERPL-MCNC: 42 NG/ML (ref 30–96)
FERRITIN SERPL-MCNC: 162 NG/ML (ref 20–300)
FOLATE SERPL-MCNC: 7.5 NG/ML (ref 4–24)
IRON SERPL-MCNC: 94 UG/DL (ref 45–160)
PROLACTIN SERPL IA-MCNC: 7.8 NG/ML (ref 3.5–19.4)
SATURATED IRON: 25 % (ref 20–50)
TESTOST SERPL-MCNC: 386 NG/DL (ref 304–1227)
TOTAL IRON BINDING CAPACITY: 379 UG/DL (ref 250–450)
TRANSFERRIN SERPL-MCNC: 256 MG/DL (ref 200–375)
VIT B12 SERPL-MCNC: 569 PG/ML (ref 210–950)

## 2024-04-25 PROCEDURE — 82746 ASSAY OF FOLIC ACID SERUM: CPT | Performed by: STUDENT IN AN ORGANIZED HEALTH CARE EDUCATION/TRAINING PROGRAM

## 2024-04-25 PROCEDURE — 3079F DIAST BP 80-89 MM HG: CPT | Mod: CPTII,S$GLB,, | Performed by: STUDENT IN AN ORGANIZED HEALTH CARE EDUCATION/TRAINING PROGRAM

## 2024-04-25 PROCEDURE — 82728 ASSAY OF FERRITIN: CPT | Performed by: STUDENT IN AN ORGANIZED HEALTH CARE EDUCATION/TRAINING PROGRAM

## 2024-04-25 PROCEDURE — 3008F BODY MASS INDEX DOCD: CPT | Mod: CPTII,S$GLB,, | Performed by: STUDENT IN AN ORGANIZED HEALTH CARE EDUCATION/TRAINING PROGRAM

## 2024-04-25 PROCEDURE — 99214 OFFICE O/P EST MOD 30 MIN: CPT | Mod: S$GLB,,, | Performed by: STUDENT IN AN ORGANIZED HEALTH CARE EDUCATION/TRAINING PROGRAM

## 2024-04-25 PROCEDURE — 36415 COLL VENOUS BLD VENIPUNCTURE: CPT | Performed by: STUDENT IN AN ORGANIZED HEALTH CARE EDUCATION/TRAINING PROGRAM

## 2024-04-25 PROCEDURE — 83540 ASSAY OF IRON: CPT | Performed by: STUDENT IN AN ORGANIZED HEALTH CARE EDUCATION/TRAINING PROGRAM

## 2024-04-25 PROCEDURE — 84402 ASSAY OF FREE TESTOSTERONE: CPT | Performed by: STUDENT IN AN ORGANIZED HEALTH CARE EDUCATION/TRAINING PROGRAM

## 2024-04-25 PROCEDURE — 3075F SYST BP GE 130 - 139MM HG: CPT | Mod: CPTII,S$GLB,, | Performed by: STUDENT IN AN ORGANIZED HEALTH CARE EDUCATION/TRAINING PROGRAM

## 2024-04-25 PROCEDURE — 84146 ASSAY OF PROLACTIN: CPT | Performed by: STUDENT IN AN ORGANIZED HEALTH CARE EDUCATION/TRAINING PROGRAM

## 2024-04-25 PROCEDURE — 82607 VITAMIN B-12: CPT | Performed by: STUDENT IN AN ORGANIZED HEALTH CARE EDUCATION/TRAINING PROGRAM

## 2024-04-25 PROCEDURE — 82306 VITAMIN D 25 HYDROXY: CPT | Performed by: STUDENT IN AN ORGANIZED HEALTH CARE EDUCATION/TRAINING PROGRAM

## 2024-04-25 PROCEDURE — 1159F MED LIST DOCD IN RCRD: CPT | Mod: CPTII,S$GLB,, | Performed by: STUDENT IN AN ORGANIZED HEALTH CARE EDUCATION/TRAINING PROGRAM

## 2024-04-25 PROCEDURE — 84403 ASSAY OF TOTAL TESTOSTERONE: CPT | Performed by: STUDENT IN AN ORGANIZED HEALTH CARE EDUCATION/TRAINING PROGRAM

## 2024-04-25 PROCEDURE — 99999 PR PBB SHADOW E&M-EST. PATIENT-LVL IV: CPT | Mod: PBBFAC,,, | Performed by: STUDENT IN AN ORGANIZED HEALTH CARE EDUCATION/TRAINING PROGRAM

## 2024-04-25 PROCEDURE — 1160F RVW MEDS BY RX/DR IN RCRD: CPT | Mod: CPTII,S$GLB,, | Performed by: STUDENT IN AN ORGANIZED HEALTH CARE EDUCATION/TRAINING PROGRAM

## 2024-04-25 NOTE — TELEPHONE ENCOUNTER
Spoke c pt. Confirmed appt location & time c Dr. Borja [facility] 06/04/24. Pt expressed understanding & was thankful.

## 2024-04-29 LAB — TESTOST FREE SERPL-MCNC: 11.4 PG/ML

## 2024-04-29 NOTE — PROGRESS NOTES
Subjective:       Patient ID: Jose oFx is a 49 y.o. male.   Chief Complaint: hand numbness      Presents today to discuss right-sided hand numbness.  Symptoms started about 1 month ago.  He feels numbness all of his fingers.  It comes and goes every several hours.  No weakness in the hand.  Works as a .  No injuries to the hand.    ED:  Symptoms worsening over the past several months.  More difficult to sustain erection.  No known history testosterone deficiency.    Review of Systems   Constitutional:  Negative for activity change and unexpected weight change.   HENT:  Negative for hearing loss, rhinorrhea and trouble swallowing.    Eyes:  Negative for discharge and visual disturbance.   Respiratory:  Negative for chest tightness and wheezing.    Cardiovascular:  Negative for chest pain and palpitations.   Gastrointestinal:  Negative for blood in stool, constipation, diarrhea and vomiting.   Endocrine: Negative for polydipsia and polyuria.   Genitourinary:  Negative for difficulty urinating, hematuria and urgency.   Musculoskeletal:  Positive for arthralgias. Negative for joint swelling and neck pain.   Neurological:  Negative for weakness and headaches.   Psychiatric/Behavioral:  Negative for confusion and dysphoric mood.               Objective:        Physical Exam  HENT:      Head: Normocephalic and atraumatic.      Nose: Nose normal.      Mouth/Throat:      Mouth: Mucous membranes are moist.      Pharynx: Oropharynx is clear.   Eyes:      Extraocular Movements: Extraocular movements intact.      Conjunctiva/sclera: Conjunctivae normal.      Pupils: Pupils are equal, round, and reactive to light.   Cardiovascular:      Rate and Rhythm: Normal rate and regular rhythm.   Pulmonary:      Effort: Pulmonary effort is normal.      Breath sounds: Normal breath sounds.   Musculoskeletal:         General: No swelling. Normal range of motion.      Cervical back: Normal range of motion.      Right lower leg: No  edema.      Left lower leg: No edema.   Skin:     General: Skin is warm.      Findings: No lesion or rash.   Neurological:      General: No focal deficit present.      Mental Status: He is alert and oriented to person, place, and time.      Motor: No weakness.         Assessment:         Problem List Items Addressed This Visit          Renal/    Erectile dysfunction    Relevant Orders    TESTOSTERONE (Completed)    Testosterone, free    PROLACTIN (Completed)     Other Visit Diagnoses       Carpal tunnel syndrome of right wrist    -  Primary    Relevant Orders    Ambulatory referral/consult to Hand Surgery    Numbness and tingling in right hand        Relevant Orders    IRON AND TIBC (Completed)    FERRITIN (Completed)    VITAMIN B12 (Completed)    FOLATE (Completed)    Vitamin D (Completed)              Plan:         1. Carpal tunnel syndrome of right wrist  -     Ambulatory referral/consult to Hand Surgery; Future; Expected date: 05/02/2024    2. Numbness and tingling in right hand  -     IRON AND TIBC; Future; Expected date: 04/25/2024  -     FERRITIN; Future; Expected date: 04/25/2024  -     VITAMIN B12; Future; Expected date: 04/25/2024  -     FOLATE; Future; Expected date: 04/25/2024  -     Vitamin D; Future; Expected date: 04/25/2024  -Wrist splint to affected side, nightly for   8 hrs  -Can also wear splint during the day if this helps to reduce pain         -Follow up 1 month if symptoms persist, sooner if symptoms worsen or change  Referral placed to hand surgery    3. Erectile dysfunction, unspecified erectile dysfunction type  -     TESTOSTERONE; Future; Expected date: 04/25/2024  -     Testosterone, free; Future; Expected date: 04/25/2024  -     PROLACTIN; Future; Expected date: 04/25/2024  Follow-up lab work        Mari García MD

## 2024-05-06 ENCOUNTER — HOSPITAL ENCOUNTER (OUTPATIENT)
Dept: RADIOLOGY | Facility: HOSPITAL | Age: 49
Discharge: HOME | End: 2024-05-06
Attending: ORTHOPAEDIC SURGERY
Payer: COMMERCIAL

## 2024-05-06 ENCOUNTER — OFFICE VISIT (OUTPATIENT)
Dept: ORTHOPEDICS | Facility: CLINIC | Age: 49
End: 2024-05-06
Payer: COMMERCIAL

## 2024-05-06 ENCOUNTER — PATIENT MESSAGE (OUTPATIENT)
Dept: NEUROLOGY | Facility: CLINIC | Age: 49
End: 2024-05-06
Payer: COMMERCIAL

## 2024-05-06 DIAGNOSIS — M1A.0710 CHRONIC IDIOPATHIC GOUT INVOLVING TOE OF RIGHT FOOT WITHOUT TOPHUS: ICD-10-CM

## 2024-05-06 DIAGNOSIS — G56.01 CARPAL TUNNEL SYNDROME OF RIGHT WRIST: ICD-10-CM

## 2024-05-06 DIAGNOSIS — M10.9 GOUT, UNSPECIFIED CAUSE, UNSPECIFIED CHRONICITY, UNSPECIFIED SITE: ICD-10-CM

## 2024-05-06 PROCEDURE — 1159F MED LIST DOCD IN RCRD: CPT | Mod: CPTII,S$GLB,, | Performed by: ORTHOPAEDIC SURGERY

## 2024-05-06 PROCEDURE — 73130 X-RAY EXAM OF HAND: CPT | Mod: 26,RT,, | Performed by: RADIOLOGY

## 2024-05-06 PROCEDURE — 99999 PR PBB SHADOW E&M-EST. PATIENT-LVL III: CPT | Mod: PBBFAC,,, | Performed by: ORTHOPAEDIC SURGERY

## 2024-05-06 PROCEDURE — 99204 OFFICE O/P NEW MOD 45 MIN: CPT | Mod: S$GLB,,, | Performed by: ORTHOPAEDIC SURGERY

## 2024-05-06 PROCEDURE — 73130 X-RAY EXAM OF HAND: CPT | Mod: TC,RT

## 2024-05-06 NOTE — PROGRESS NOTES
Subjective:       Patient ID: Jose Fox is a 49 y.o. male.    Chief Complaint: Pain of the Left Hand and Pain      HPI  3/4/24  Patient reports clinic today with left hand mass present on the dorsum of the hand that is about 3 cm by 3 cm.  He states it has been present for the past 6 months.  She notes it is bothersome when he is doing any weightlifting activities where his wrist is in extension.  He denies any numbness or tingling.  He denies any previous trauma or surgeries to the wrist or hand. He has not tried bracing throughout the day.     Patient states he works as an  at the [employer]    Interval Update 05/06/24  Pt presents in clinic today to review the results of his US 03/11/24. Pt denies pain, but states that he does experience soreness and hindrance with (over)use at work. Pt reports that he wraps the wrist for gym workouts and is limited with wrist extension. Pt also reports numbness to the fingertips of his right hand.   Mostly in the middle finger  No pain in the right hand  Notices the numbness during day      Past Medical History:   Diagnosis Date    Gout     Hypertension     Hypothyroidism     Thyroid disease      Past Surgical History:   Procedure Laterality Date    CARDIAC ELECTROPHYSIOLOGY STUDY AND ABLATION  2010    LEG SURGERY Left     ROBOT-ASSISTED LAPAROSCOPIC REPAIR OF VENTRAL HERNIA N/A 05/11/2023    Procedure: ROBOTIC REPAIR, HERNIA, VENTRAL;  Surgeon: José Miguel Espino MD;  Location: [facility];  Service: General;  Laterality: N/A;  make second case     Family History   Problem Relation Age of Onset    Hypertension Mother     Arthritis Mother     Hypertension Father     Diabetes Father     Heart disease Father     Drug abuse Son      Social History     Socioeconomic History    Marital status:     Number of children: 3   Occupational History    Occupation: [employer]      Comment: Formerly [employer]   Tobacco Use    Smoking status: Never    Smokeless  tobacco: Never   Substance and Sexual Activity    Alcohol use: Never    Drug use: Never    Sexual activity: Yes     Partners: Female     Birth control/protection: None   Social History Narrative    23.18    25      Social Determinants of Health     Financial Resource Strain: Low Risk  (11/9/2022)    Overall Financial Resource Strain (CARDIA)     Difficulty of Paying Living Expenses: Not hard at all   Food Insecurity: No Food Insecurity (11/9/2022)    Hunger Vital Sign     Worried About Running Out of Food in the Last Year: Never true     Ran Out of Food in the Last Year: Never true   Transportation Needs: No Transportation Needs (11/9/2022)    PRAPARE - Transportation     Lack of Transportation (Medical): No     Lack of Transportation (Non-Medical): No   Physical Activity: Sufficiently Active (11/9/2022)    Exercise Vital Sign     Days of Exercise per Week: 3 days     Minutes of Exercise per Session: 80 min   Stress: Stress Concern Present (11/9/2022)    [employer] [place] [employer] - Occupational Stress Questionnaire     Feeling of Stress : To some extent   Social Connections: Unknown (11/9/2022)    Social Connection and Isolation Panel [NHANES]     Frequency of Communication with Friends and Family: More than three times a week     Frequency of Social Gatherings with Friends and Family: Never     Active Member of Clubs or Organizations: No     Attends Club or Organization Meetings: Patient declined     Marital Status:    Housing Stability: Unknown (11/9/2022)    Housing Stability Vital Sign     Unable to Pay for Housing in the Last Year: No     Unstable Housing in the Last Year: No       Current Outpatient Medications   Medication Sig Dispense Refill    amLODIPine (NORVASC) 10 MG tablet Take 1 tablet (10 mg total) by mouth once daily. 90 tablet 3    buPROPion (WELLBUTRIN XL) 300 MG 24 hr tablet Take 1 tablet (300 mg total) by mouth once daily. 30 tablet 11    diclofenac sodium (VOLTAREN) 1 % Gel  "Apply 2 g topically 4 (four) times daily. 100 g 2    febuxostat (ULORIC) 40 mg Tab Take 1 tablet (40 mg total) by mouth once daily. 90 tablet 3    indomethacin (INDOCIN) 50 MG capsule Take 1 capsule (50 mg total) by mouth every meal as needed (For acute flare-up of gout). 90 capsule 3    losartan-hydrochlorothiazide 100-25 mg (HYZAAR) 100-25 mg per tablet Take 1 tablet by mouth once daily. 90 tablet 0    semaglutide (OZEMPIC) 1 mg/dose (2 mg/1.5 mL) PnIj Inject 1 mg into the skin every 7 days. 4 each 5    sumatriptan (IMITREX) 50 MG tablet Take 1 tablet (50 mg total) by mouth every 4 (four) hours as needed for Migraine. Not to exceed 3 tabs per day 12 tablet 1    tirzepatide (MOUNJARO) 2.5 mg/0.5 mL PnIj Inject 2.5 mg into the skin every 7 days. 12 pen 1     No current facility-administered medications for this visit.     Review of patient's allergies indicates:  No Known Allergies    Review of Systems        Objective:      Vitals:    03/04/24 1031   BP: (!) 146/90   SpO2: (!) 69%   Weight: 119.1 kg (262 lb 9.1 oz)   Height: 6' 2" (1.88 m)     Physical Exam  Cardiovascular:      Pulses:           Radial pulses are Normal on the right side and Normal on the left side.        Bilateral Negative tinels both carpal and cubital, negative Phalan's and negative Durkan's    Hand/Wrist Musculoskeletal Exam    Inspection    Right      Erythema: none      Ecchymosis: none      Edema: none      Deformity: none    Left      Erythema: none      Ecchymosis: none      Edema: none      Deformity: none    Inspection additional comments: 3 x 3 cm mass present on the dorsum of the wrist.    Palpation    Palpation additional comments: Soft mobile mass present the dorsum of the wrist measuring 3 x 3 cm that is mildly tender    Range of Motion    Right Hand      Right hand range of motion is normal.      Left Hand      Left hand range of motion is normal.       Right Wrist      Right wrist range of motion is normal.      Left Wrist      " Left wrist range of motion is slightly limited extension of the left wrist    Neurovascular    Right       Radial pulse: normal      Capillary refill: <3 sec and brisk      Ulnar nerve sensory distribution: normal      Median nerve sensory distribution: decreased sensation      Superficial radial nerve sensory distribution: normal    + Durkins      Left       Radial pulse: normal      Capillary refill: brisk and <3 sec      Ulnar nerve sensory distribution: normal      Median nerve sensory distribution: normal      Superficial radial nerve sensory distribution: normal      Diagnostics Review:   X-Ray: Left Wrist 03/04/24  No acute fracture or dislocation seen.     Subchondral lucencies along the 2nd and 5th MCP joints which can be seen with cyst formation in the setting of degenerative change.  Erosions in the setting of an inflammatory arthritis such as rheumatoid arthritis could have a similar appearance in the appropriate setting.     No significant soft tissue edema or radiopaque retained foreign body.      US 03/11/24 Left Wrist  FINDINGS:  In the region of palpable abnormality is a predominantly solid isoechoic focus measuring 1.7 x 2.2 x 1.1 cm intimately associated with an underlying tendon.  Internal vascularity identified on color images.  It is difficult to determine on these images if this is located within the substance of the tendon or along the tendon sheath.  Considerations would include tenosynovitis versus giant cell tumor of the tendon sheath.  Correlation with MRI without and with contrast would be helpful in further evaluating.       Assessment:       1. Mass of joint of left wrist        Plan:     Excisional Bx of left dorsal wrist mass- consents done todayu  EMG/NCS and xray of right UE

## 2024-05-06 NOTE — TELEPHONE ENCOUNTER
No care due was identified.  [facility] Embedded Care Due Messages. Reference number: [id number].   5/06/2024 12:57:27 PM CDT

## 2024-05-07 ENCOUNTER — PATIENT MESSAGE (OUTPATIENT)
Dept: PRIMARY CARE CLINIC | Facility: CLINIC | Age: 49
End: 2024-05-07
Payer: COMMERCIAL

## 2024-05-09 DIAGNOSIS — G56.01 CARPAL TUNNEL SYNDROME OF RIGHT WRIST: Primary | ICD-10-CM

## 2024-05-09 DIAGNOSIS — M25.832 MASS OF JOINT OF LEFT WRIST: ICD-10-CM

## 2024-05-09 RX ORDER — INDOMETHACIN 50 MG/1
CAPSULE ORAL
Qty: 90 CAPSULE | Refills: 0 | OUTPATIENT
Start: 2024-05-09

## 2024-05-12 DIAGNOSIS — M79.641 RIGHT HAND PAIN: Primary | ICD-10-CM

## 2024-05-12 NOTE — PROGRESS NOTES
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      Will refer patient to rheumatology for evaluation/consultation.

## 2024-05-13 ENCOUNTER — PATIENT MESSAGE (OUTPATIENT)
Dept: FAMILY MEDICINE | Facility: CLINIC | Age: 49
End: 2024-05-13
Payer: COMMERCIAL

## 2024-05-14 DIAGNOSIS — I10 ESSENTIAL HYPERTENSION: ICD-10-CM

## 2024-05-14 DIAGNOSIS — M10.9 GOUT, UNSPECIFIED CAUSE, UNSPECIFIED CHRONICITY, UNSPECIFIED SITE: ICD-10-CM

## 2024-05-14 DIAGNOSIS — M1A.0710 CHRONIC IDIOPATHIC GOUT INVOLVING TOE OF RIGHT FOOT WITHOUT TOPHUS: ICD-10-CM

## 2024-05-14 DIAGNOSIS — M10.071 IDIOPATHIC GOUT INVOLVING TOE OF RIGHT FOOT, UNSPECIFIED CHRONICITY: ICD-10-CM

## 2024-05-14 DIAGNOSIS — F32.A DEPRESSION, UNSPECIFIED DEPRESSION TYPE: ICD-10-CM

## 2024-05-14 RX ORDER — FEBUXOSTAT 40 MG/1
40 TABLET, FILM COATED ORAL DAILY
Qty: 90 TABLET | Refills: 3 | Status: SHIPPED | OUTPATIENT
Start: 2024-05-14

## 2024-05-14 RX ORDER — LOSARTAN POTASSIUM AND HYDROCHLOROTHIAZIDE 25; 100 MG/1; MG/1
1 TABLET ORAL DAILY
Qty: 90 TABLET | Refills: 3 | Status: SHIPPED | OUTPATIENT
Start: 2024-05-14 | End: 2024-06-18 | Stop reason: SDUPTHER

## 2024-05-14 RX ORDER — INDOMETHACIN 50 MG/1
50 CAPSULE ORAL
Qty: 90 CAPSULE | Refills: 0 | Status: SHIPPED | OUTPATIENT
Start: 2024-05-14 | End: 2024-06-10 | Stop reason: SDUPTHER

## 2024-05-14 RX ORDER — BUPROPION HYDROCHLORIDE 300 MG/1
300 TABLET ORAL DAILY
Qty: 90 TABLET | Refills: 3 | Status: SHIPPED | OUTPATIENT
Start: 2024-05-14 | End: 2024-06-10 | Stop reason: SDUPTHER

## 2024-05-14 RX ORDER — SUMATRIPTAN 50 MG/1
50 TABLET, FILM COATED ORAL EVERY 4 HOURS PRN
Qty: 12 TABLET | Refills: 1 | Status: SHIPPED | OUTPATIENT
Start: 2024-05-14 | End: 2024-06-10 | Stop reason: SDUPTHER

## 2024-05-14 NOTE — TELEPHONE ENCOUNTER
No care due was identified.  [facility] Embedded Care Due Messages. Reference number: [id number].   5/14/2024 12:44:44 PM CDT

## 2024-05-16 ENCOUNTER — TELEPHONE (OUTPATIENT)
Dept: ORTHOPEDICS | Facility: CLINIC | Age: 49
End: 2024-05-16
Payer: COMMERCIAL

## 2024-05-16 NOTE — TELEPHONE ENCOUNTER
Spoke c pt. Pushed his surgery back one week from 6/3/24 to 6/10/24. Adjusted PO appt. Book updated and surgery center notified. Patient verbalized understanding and was thankful.         ----- Message from Janny Mohamud sent at 5/16/2024 12:10 PM CDT -----  Regarding: RESCHEDULE PROCEDURE  Type:  Appointment Request    Name of Caller:BRUNILDA KEEN [[id number]]  When is the first available appointment?No access  Symptoms:SURGERY  Would the patient rather a call back or a response via [facility]? CALL BACK   Best Call Back Number: [phone]  Additional Information: pt indicates he is schedule for a procedure 6/03. Pt indicates he wont be able to make appt. Pt indicates he would like to reschedule that procedure. Please call back with further assistance and more information.

## 2024-05-27 ENCOUNTER — HOSPITAL ENCOUNTER (OUTPATIENT)
Dept: RADIOLOGY | Facility: HOSPITAL | Age: 49
Discharge: HOME | End: 2024-05-27
Attending: INTERNAL MEDICINE
Payer: COMMERCIAL

## 2024-05-27 ENCOUNTER — OFFICE VISIT (OUTPATIENT)
Dept: RHEUMATOLOGY | Facility: CLINIC | Age: 49
End: 2024-05-27
Payer: COMMERCIAL

## 2024-05-27 VITALS
HEIGHT: 74 IN | WEIGHT: 253.5 LBS | SYSTOLIC BLOOD PRESSURE: 129 MMHG | HEART RATE: 83 BPM | DIASTOLIC BLOOD PRESSURE: 83 MMHG | BODY MASS INDEX: 32.53 KG/M2

## 2024-05-27 DIAGNOSIS — R93.6 ABNORMAL X-RAY OF HAND: Primary | ICD-10-CM

## 2024-05-27 DIAGNOSIS — Z79.899 HIGH RISK MEDICATION USE: ICD-10-CM

## 2024-05-27 DIAGNOSIS — M79.89 SWELLING OF LEFT HAND: ICD-10-CM

## 2024-05-27 DIAGNOSIS — M10.9 GOUT, ARTHRITIS: ICD-10-CM

## 2024-05-27 DIAGNOSIS — M79.641 RIGHT HAND PAIN: ICD-10-CM

## 2024-05-27 DIAGNOSIS — R20.2 PARESTHESIAS IN RIGHT HAND: ICD-10-CM

## 2024-05-27 DIAGNOSIS — R93.6 ABNORMAL X-RAY OF HAND: ICD-10-CM

## 2024-05-27 PROCEDURE — 3074F SYST BP LT 130 MM HG: CPT | Mod: CPTII,S$GLB,, | Performed by: INTERNAL MEDICINE

## 2024-05-27 PROCEDURE — 99204 OFFICE O/P NEW MOD 45 MIN: CPT | Mod: S$GLB,,, | Performed by: INTERNAL MEDICINE

## 2024-05-27 PROCEDURE — 3008F BODY MASS INDEX DOCD: CPT | Mod: CPTII,S$GLB,, | Performed by: INTERNAL MEDICINE

## 2024-05-27 PROCEDURE — 73630 X-RAY EXAM OF FOOT: CPT | Mod: TC,50

## 2024-05-27 PROCEDURE — 3079F DIAST BP 80-89 MM HG: CPT | Mod: CPTII,S$GLB,, | Performed by: INTERNAL MEDICINE

## 2024-05-27 PROCEDURE — 73630 X-RAY EXAM OF FOOT: CPT | Mod: 26,50,, | Performed by: RADIOLOGY

## 2024-05-27 PROCEDURE — 1160F RVW MEDS BY RX/DR IN RCRD: CPT | Mod: CPTII,S$GLB,, | Performed by: INTERNAL MEDICINE

## 2024-05-27 PROCEDURE — 99999 PR PBB SHADOW E&M-EST. PATIENT-LVL IV: CPT | Mod: PBBFAC,,, | Performed by: INTERNAL MEDICINE

## 2024-05-27 PROCEDURE — 1159F MED LIST DOCD IN RCRD: CPT | Mod: CPTII,S$GLB,, | Performed by: INTERNAL MEDICINE

## 2024-05-27 NOTE — PROGRESS NOTES
"Subjective:       Patient ID: Jose Fox is a 49 y.o. male.    Chief Complaint: Numbness right hand    HPI:  Jose Fox is a 49 y.o. male right hand dominant  with history HTN, depression and gout since 2012 sent by Dr. Mari García for numbness in right hand especially 1st-3rd fingers since beginning of 2024.  Wrapping wrist at gym helps sensation.   No pain.  No morning stiffness.    No left hand involvement except mass on back of hand.     Saw Dr. Louis for mass on dorsum of left hand that began 2020 and is now limiting motion despite not enlarging.     History of gout since 2012 initially involved foot but has had it in hands, elbows, knees and feet.   Takes feboxostat daily and indomethacin for attacks.   Last attack a year ago.    History of kidney in 2017.  Type unknown.     Family of mother with arthritis in hands type unknown.       Review of Systems   Constitutional: Negative.    HENT: Negative.     Eyes: Negative.    Respiratory: Negative.     Cardiovascular: Negative.    Gastrointestinal: Negative.    Endocrine: Negative.    Genitourinary: Negative.    Musculoskeletal: Negative.    Skin: Negative.    Allergic/Immunologic: Negative.    Neurological:         Paresthesia right hand   Hematological: Negative.    Psychiatric/Behavioral:  Positive for sleep disturbance.         Depression           Objective:   /83   Pulse 83   Ht 6' 2" (1.88 m)   Wt 115 kg (253 lb 8.5 oz)   BMI 32.55 kg/m²      Physical Exam   Constitutional: He is oriented to person, place, and time. normal appearance.   HENT:   Head: Normocephalic and atraumatic.   Cardiovascular: Normal rate.   Pulmonary/Chest: Effort normal and breath sounds normal.   Abdominal: Soft. Bowel sounds are normal.   Musculoskeletal:      Comments: 28 joint count: 0 tender and 1 swollen (left wrist swelling)  No MTP pain bilateral   Neurological: He is alert and oriented to person, place, and time.   Positive Phalens and " Tinels on right hand only.   Skin: Skin is warm and dry.            LABS    Component      Latest Ref Rng 3/31/2022 9/25/2023 3/8/2024 4/25/2024   Iron      45 - 160 ug/dL    94    Transferrin      200 - 375 mg/dL    256    TIBC      250 - 450 ug/dL    379    Saturated Iron      20 - 50 %    25    Hemoglobin A1C External      4.0 - 5.6 %  5.5      Estimated Avg Glucose      68 - 131 mg/dL  111      Uric Acid      3.4 - 7.0 mg/dL 11.8 (H)       CRP      0.0 - 8.2 mg/L   5.5     CCP Antibodies      <5.0 U/mL   <0.5     Rheumatoid Factor      0.0 - 15.0 IU/mL   <13.0     Sed Rate      0 - 23 mm/Hr   10     GARY Screen      Negative <1:80    Negative <1:80     Ferritin      20.0 - 300.0 ng/mL    162    Vitamin B12      210 - 950 pg/mL    569    Folate      4.0 - 24.0 ng/mL    7.5    Vitamin D      30 - 96 ng/mL    42    Testosterone, Total      304 - 1227 ng/dL    386    Testosterone, Free      pg/mL    11.4    Prolactin      3.5 - 19.4 ng/mL    7.8       Legend:  (H) High      5/6/2024    FINDINGS:  Mild moderate erosive arthritis changes 2nd and 5th MCP joints.  Accessory ossicle distal ulnar styloid process.  Early DJD juxta-articular remote calcification laterally at 1st MCP.  No fracture, dislocation.     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  Assessment:       Right hand paresthesias. RA versus overuse as  CTS.  Positive Phalens and Tinels on right.   Right hand erosive changes 2nd and 5th MCP  Swelling on dorsum of left hand.  Concerning for tenosynovitis  History of gout.  Multiple attacks, hyperuricemia and response to indomethacin.  Now on febuxostat 40 mg daily and indomethacin prn.   History of kidney stone tyoe unknown  Father with history of gout and mother with arthritis type unknown   Plan:       Labs, x-ray  feet to evaluate for erosive changes due to gout, MRI hands to rule out RA as cause of erosions and possible tenosynovitis of left hand.  RTO 4 months/prn

## 2024-05-28 ENCOUNTER — PATIENT MESSAGE (OUTPATIENT)
Dept: RHEUMATOLOGY | Facility: CLINIC | Age: 49
End: 2024-05-28
Payer: COMMERCIAL

## 2024-05-29 ENCOUNTER — PATIENT MESSAGE (OUTPATIENT)
Dept: RHEUMATOLOGY | Facility: CLINIC | Age: 49
End: 2024-05-29
Payer: COMMERCIAL

## 2024-05-31 ENCOUNTER — TELEPHONE (OUTPATIENT)
Dept: ORTHOPEDICS | Facility: CLINIC | Age: 49
End: 2024-05-31
Payer: COMMERCIAL

## 2024-06-06 ENCOUNTER — ANESTHESIA EVENT (OUTPATIENT)
Dept: SURGERY | Facility: HOSPITAL | Age: 49
End: 2024-06-06
Payer: COMMERCIAL

## 2024-06-07 ENCOUNTER — TELEPHONE (OUTPATIENT)
Dept: ORTHOPEDICS | Facility: CLINIC | Age: 49
End: 2024-06-07
Payer: COMMERCIAL

## 2024-06-07 RX ORDER — TRAMADOL HYDROCHLORIDE 50 MG/1
50 TABLET ORAL EVERY 6 HOURS PRN
Qty: 10 TABLET | Refills: 0 | Status: SHIPPED | OUTPATIENT
Start: 2024-06-07

## 2024-06-07 NOTE — ANESTHESIA PREPROCEDURE EVALUATION
Pt off mounjaro over 2 weeks                                                                                                             06/07/2024  Jose Fox is a 49 y.o., male.      Pre-op Assessment    I have reviewed the Patient Summary Reports.     I have reviewed the Nursing Notes. I have reviewed the NPO Status.   I have reviewed the Medications.     Review of Systems  Anesthesia Hx:              Personal Hx of Anesthesia complications, Post-Operative Nausea/Vomiting, in the past, but not with recent anesthetics / prophylaxis                    Social:  Non-Smoker, No Alcohol Use       Hematology/Oncology:  Hematology Normal                                     Cardiovascular:  Exercise tolerance: good   Denies Pacemaker. Hypertension  Past MI (2016)        Denies Angina.                                  Pulmonary:  Pulmonary Normal      Denies Shortness of breath.   Denies Sleep Apnea.                Renal/:  Renal/ Normal                 Hepatic/GI:  Hepatic/GI Normal                 Musculoskeletal:  Musculoskeletal Normal                Neurological:  Denies TIA.  Denies CVA. Neuromuscular Disease,  Headaches Denies Seizures.                                Endocrine:  Denies Diabetes.  Hyperthyroidism       Obesity / BMI > 30      Physical Exam  General: Cooperative, Alert and Well nourished    Airway:  Mallampati: II         Anesthesia Plan  Type of Anesthesia, risks & benefits discussed:    Anesthesia Type: Gen Natural Airway, MAC  Intra-op Monitoring Plan: Standard ASA Monitors  Post Op Pain Control Plan: multimodal analgesia and IV/PO Opioids PRN  Induction:  IV  Informed Consent: Informed consent signed with the Patient and all parties understand the risks and agree with anesthesia plan.  All questions answered.   ASA Score: 2    Ready For Surgery From Anesthesia Perspective.     .

## 2024-06-07 NOTE — TELEPHONE ENCOUNTER
Spoke c pt. Informed pt of 7:00a.m. arrival time for  surgery at the [facility]. Reminded pt of NPO status & PO appt. Pt expressed understanding & was thankful.

## 2024-06-10 ENCOUNTER — HOSPITAL ENCOUNTER (OUTPATIENT)
Facility: HOSPITAL | Age: 49
Discharge: HOME | End: 2024-06-10
Attending: ORTHOPAEDIC SURGERY | Admitting: ORTHOPAEDIC SURGERY
Payer: COMMERCIAL

## 2024-06-10 ENCOUNTER — ANESTHESIA (OUTPATIENT)
Dept: SURGERY | Facility: HOSPITAL | Age: 49
End: 2024-06-10
Payer: COMMERCIAL

## 2024-06-10 VITALS
TEMPERATURE: 98 F | OXYGEN SATURATION: 97 % | HEART RATE: 66 BPM | WEIGHT: 253 LBS | SYSTOLIC BLOOD PRESSURE: 150 MMHG | HEIGHT: 74 IN | RESPIRATION RATE: 18 BRPM | BODY MASS INDEX: 32.47 KG/M2 | DIASTOLIC BLOOD PRESSURE: 90 MMHG

## 2024-06-10 DIAGNOSIS — M1A.0710 CHRONIC IDIOPATHIC GOUT INVOLVING TOE OF RIGHT FOOT WITHOUT TOPHUS: ICD-10-CM

## 2024-06-10 DIAGNOSIS — F32.A DEPRESSION, UNSPECIFIED DEPRESSION TYPE: ICD-10-CM

## 2024-06-10 DIAGNOSIS — M10.9 GOUT, UNSPECIFIED CAUSE, UNSPECIFIED CHRONICITY, UNSPECIFIED SITE: ICD-10-CM

## 2024-06-10 DIAGNOSIS — R22.32 MASS OF LEFT WRIST: Primary | ICD-10-CM

## 2024-06-10 PROCEDURE — 25000003 PHARM REV CODE 250: Performed by: PHYSICIAN ASSISTANT

## 2024-06-10 PROCEDURE — 63600175 PHARM REV CODE 636 W HCPCS: Performed by: NURSE ANESTHETIST, CERTIFIED REGISTERED

## 2024-06-10 PROCEDURE — 88305 TISSUE EXAM BY PATHOLOGIST: CPT | Performed by: PATHOLOGY

## 2024-06-10 PROCEDURE — 25000003 PHARM REV CODE 250: Performed by: ORTHOPAEDIC SURGERY

## 2024-06-10 PROCEDURE — 36000707: Performed by: ORTHOPAEDIC SURGERY

## 2024-06-10 PROCEDURE — 88307 TISSUE EXAM BY PATHOLOGIST: CPT | Performed by: PATHOLOGY

## 2024-06-10 PROCEDURE — 37000008 HC ANESTHESIA 1ST 15 MINUTES: Performed by: ORTHOPAEDIC SURGERY

## 2024-06-10 PROCEDURE — 64415 NJX AA&/STRD BRCH PLXS IMG: CPT | Performed by: ANESTHESIOLOGY

## 2024-06-10 PROCEDURE — 36000706: Performed by: ORTHOPAEDIC SURGERY

## 2024-06-10 PROCEDURE — 63600175 PHARM REV CODE 636 W HCPCS: Performed by: PHYSICIAN ASSISTANT

## 2024-06-10 PROCEDURE — 71000033 HC RECOVERY, INTIAL HOUR: Performed by: ORTHOPAEDIC SURGERY

## 2024-06-10 PROCEDURE — 88305 TISSUE EXAM BY PATHOLOGIST: CPT | Mod: 26,,, | Performed by: PATHOLOGY

## 2024-06-10 PROCEDURE — 25000003 PHARM REV CODE 250: Performed by: NURSE ANESTHETIST, CERTIFIED REGISTERED

## 2024-06-10 PROCEDURE — 71000015 HC POSTOP RECOV 1ST HR: Performed by: ORTHOPAEDIC SURGERY

## 2024-06-10 PROCEDURE — 37000009 HC ANESTHESIA EA ADD 15 MINS: Performed by: ORTHOPAEDIC SURGERY

## 2024-06-10 PROCEDURE — 94761 N-INVAS EAR/PLS OXIMETRY MLT: CPT

## 2024-06-10 PROCEDURE — 63600175 PHARM REV CODE 636 W HCPCS: Mod: JZ,JG | Performed by: ANESTHESIOLOGY

## 2024-06-10 PROCEDURE — D9220A PRA ANESTHESIA: Mod: ANES,,, | Performed by: ANESTHESIOLOGY

## 2024-06-10 PROCEDURE — 99900035 HC TECH TIME PER 15 MIN (STAT)

## 2024-06-10 PROCEDURE — D9220A PRA ANESTHESIA: Mod: CRNA,,, | Performed by: NURSE ANESTHETIST, CERTIFIED REGISTERED

## 2024-06-10 PROCEDURE — 25118 EXCISE WRIST TENDON SHEATH: CPT | Mod: LT,,, | Performed by: ORTHOPAEDIC SURGERY

## 2024-06-10 RX ORDER — HALOPERIDOL 5 MG/ML
0.5 INJECTION INTRAMUSCULAR EVERY 10 MIN PRN
Status: DISCONTINUED | OUTPATIENT
Start: 2024-06-10 | End: 2024-06-10 | Stop reason: HOSPADM

## 2024-06-10 RX ORDER — FENTANYL CITRATE 50 UG/ML
100 INJECTION, SOLUTION INTRAMUSCULAR; INTRAVENOUS
Status: DISCONTINUED | OUTPATIENT
Start: 2024-06-10 | End: 2024-06-10 | Stop reason: HOSPADM

## 2024-06-10 RX ORDER — ONDANSETRON HYDROCHLORIDE 2 MG/ML
INJECTION, SOLUTION INTRAVENOUS
Status: DISCONTINUED | OUTPATIENT
Start: 2024-06-10 | End: 2024-06-10

## 2024-06-10 RX ORDER — INDOMETHACIN 50 MG/1
50 CAPSULE ORAL
Qty: 90 CAPSULE | Refills: 0 | Status: SHIPPED | OUTPATIENT
Start: 2024-06-10

## 2024-06-10 RX ORDER — LIDOCAINE HYDROCHLORIDE 20 MG/ML
INJECTION INTRAVENOUS
Status: DISCONTINUED | OUTPATIENT
Start: 2024-06-10 | End: 2024-06-10

## 2024-06-10 RX ORDER — BUPROPION HYDROCHLORIDE 300 MG/1
300 TABLET ORAL DAILY
Qty: 90 TABLET | Refills: 3 | Status: SHIPPED | OUTPATIENT
Start: 2024-06-10 | End: 2025-06-10

## 2024-06-10 RX ORDER — FENTANYL CITRATE 50 UG/ML
25 INJECTION, SOLUTION INTRAMUSCULAR; INTRAVENOUS EVERY 5 MIN PRN
Status: DISCONTINUED | OUTPATIENT
Start: 2024-06-10 | End: 2024-06-10 | Stop reason: HOSPADM

## 2024-06-10 RX ORDER — FAMOTIDINE 10 MG/ML
INJECTION INTRAVENOUS
Status: DISCONTINUED | OUTPATIENT
Start: 2024-06-10 | End: 2024-06-10

## 2024-06-10 RX ORDER — CEFAZOLIN SODIUM 1 G/3ML
INJECTION, POWDER, FOR SOLUTION INTRAMUSCULAR; INTRAVENOUS
Status: DISCONTINUED | OUTPATIENT
Start: 2024-06-10 | End: 2024-06-10

## 2024-06-10 RX ORDER — OXYCODONE HYDROCHLORIDE 5 MG/1
5 TABLET ORAL
Status: DISCONTINUED | OUTPATIENT
Start: 2024-06-10 | End: 2024-06-10 | Stop reason: HOSPADM

## 2024-06-10 RX ORDER — PROPOFOL 10 MG/ML
VIAL (ML) INTRAVENOUS CONTINUOUS PRN
Status: DISCONTINUED | OUTPATIENT
Start: 2024-06-10 | End: 2024-06-10

## 2024-06-10 RX ORDER — MIDAZOLAM HYDROCHLORIDE 1 MG/ML
1 INJECTION, SOLUTION INTRAMUSCULAR; INTRAVENOUS
Status: DISCONTINUED | OUTPATIENT
Start: 2024-06-10 | End: 2024-06-10 | Stop reason: HOSPADM

## 2024-06-10 RX ORDER — BUPIVACAINE HYDROCHLORIDE 5 MG/ML
INJECTION, SOLUTION EPIDURAL; INTRACAUDAL
Status: COMPLETED | OUTPATIENT
Start: 2024-06-10 | End: 2024-06-10

## 2024-06-10 RX ORDER — HYDROMORPHONE HYDROCHLORIDE 1 MG/ML
0.2 INJECTION, SOLUTION INTRAMUSCULAR; INTRAVENOUS; SUBCUTANEOUS EVERY 5 MIN PRN
Status: DISCONTINUED | OUTPATIENT
Start: 2024-06-10 | End: 2024-06-10 | Stop reason: HOSPADM

## 2024-06-10 RX ORDER — DEXAMETHASONE SODIUM PHOSPHATE 4 MG/ML
INJECTION, SOLUTION INTRA-ARTICULAR; INTRALESIONAL; INTRAMUSCULAR; INTRAVENOUS; SOFT TISSUE
Status: DISCONTINUED | OUTPATIENT
Start: 2024-06-10 | End: 2024-06-10

## 2024-06-10 RX ORDER — ACETAMINOPHEN 500 MG
1000 TABLET ORAL
Status: COMPLETED | OUTPATIENT
Start: 2024-06-10 | End: 2024-06-10

## 2024-06-10 RX ORDER — SUMATRIPTAN 50 MG/1
50 TABLET, FILM COATED ORAL EVERY 4 HOURS PRN
Qty: 12 TABLET | Refills: 1 | Status: SHIPPED | OUTPATIENT
Start: 2024-06-10

## 2024-06-10 RX ORDER — BACITRACIN ZINC 500 UNIT/G
OINTMENT (GRAM) TOPICAL
Status: DISCONTINUED | OUTPATIENT
Start: 2024-06-10 | End: 2024-06-10 | Stop reason: HOSPADM

## 2024-06-10 RX ORDER — SODIUM CHLORIDE 9 MG/ML
INJECTION, SOLUTION INTRAVENOUS CONTINUOUS PRN
Status: DISCONTINUED | OUTPATIENT
Start: 2024-06-10 | End: 2024-06-10

## 2024-06-10 RX ADMIN — BUPIVACAINE HYDROCHLORIDE 30 ML: 5 INJECTION, SOLUTION EPIDURAL; INTRACAUDAL; PERINEURAL at 08:06

## 2024-06-10 RX ADMIN — ACETAMINOPHEN 1000 MG: 500 TABLET ORAL at 07:06

## 2024-06-10 RX ADMIN — FAMOTIDINE 20 MG: 10 INJECTION, SOLUTION INTRAVENOUS at 08:06

## 2024-06-10 RX ADMIN — PROPOFOL 125 MCG/KG/MIN: 10 INJECTION, EMULSION INTRAVENOUS at 08:06

## 2024-06-10 RX ADMIN — DEXAMETHASONE SODIUM PHOSPHATE 4 MG: 4 INJECTION, SOLUTION INTRAMUSCULAR; INTRAVENOUS at 08:06

## 2024-06-10 RX ADMIN — ONDANSETRON 4 MG: 2 INJECTION INTRAMUSCULAR; INTRAVENOUS at 08:06

## 2024-06-10 RX ADMIN — CEFAZOLIN 2 G: 330 INJECTION, POWDER, FOR SOLUTION INTRAMUSCULAR; INTRAVENOUS at 08:06

## 2024-06-10 RX ADMIN — FENTANYL CITRATE 50 MCG: 50 INJECTION INTRAMUSCULAR; INTRAVENOUS at 08:06

## 2024-06-10 RX ADMIN — LIDOCAINE HYDROCHLORIDE 100 MG: 20 INJECTION INTRAVENOUS at 08:06

## 2024-06-10 RX ADMIN — MIDAZOLAM 2 MG: 1 INJECTION INTRAMUSCULAR; INTRAVENOUS at 08:06

## 2024-06-10 RX ADMIN — SODIUM CHLORIDE: 0.9 INJECTION, SOLUTION INTRAVENOUS at 07:06

## 2024-06-10 NOTE — TRANSFER OF CARE
"Anesthesia Transfer of Care Note    Patient: Jose Fox    Procedure(s) Performed: Procedure(s) (LRB):  LEFT DORSAL HAND EXCISION, MASS (Left)    Patient location: PACU    Anesthesia Type: general and regional    Transport from OR: Transported from OR on 100% O2 by closed face mask with adequate spontaneous ventilation    Post pain: adequate analgesia    Post assessment: no apparent anesthetic complications and tolerated procedure well    Post vital signs: stable    Level of consciousness: awake and responds to stimulation    Nausea/Vomiting: no nausea/vomiting    Complications: none    Transfer of care protocol was followed    Last vitals: Visit Vitals  BP (!) 136/90 (BP Location: Right arm, Patient Position: Lying)   Pulse 69   Temp 36.9 °C (98.4 °F) (Temporal)   Resp 17   Ht 6' 2" (1.88 m)   Wt 114.8 kg (253 lb)   SpO2 99%   BMI 32.48 kg/m²     "

## 2024-06-10 NOTE — DISCHARGE INSTRUCTIONS
AFTER HAND SURGERY    DOS:  Keep affected wrist elevated above the level of your heart  Check circulation frequently in fingers by pressing on the nail bed. Nail bed should turn white and then pink when released.  Exercise fingers to promote circulation.  Advance diet as tolerated  Resume home medications today.  Keep dressing clean, dry and intact until your clinic visit      DONT:  No driving for 24 hours or while taking narcotic pain medication      CALL PHYSICIAN FOR:  Obvious bleeding  Excessive swelling  Drainage (pus) from the wound  Pain unrelieved by pain medication.

## 2024-06-10 NOTE — BRIEF OP NOTE
[facility] - Surgery ([facility])  Brief Operative Note    Surgery Date: 6/10/2024     Surgeons and Role:     * Flores Louis MD - Primary    Assisting Surgeon: None    Pre-op Diagnosis:  Carpal tunnel syndrome of right wrist [G56.01]  Mass of joint of left wrist [M25.832]    Post-op Diagnosis:  Post-Op Diagnosis Codes:     * Carpal tunnel syndrome of right wrist [G56.01]     * Mass of joint of left wrist [M25.832]    Procedure(s) (LRB):  LEFT DORSAL HAND EXCISION, MASS (Left)    Anesthesia: Regional    Operative Findings: Extensive extensor tenosynovitis largely limited to the long finger. Debrided and sent to pathology.    Estimated Blood Loss: 0 mL         Specimens:   Specimen (24h ago, onward)       Start     Ordered    06/10/24 0926  Specimen to Pathology, Surgery Orthopedics  Once        Comments: Pre-op Diagnosis: Carpal tunnel syndrome of right wrist [G56.01]Mass of joint of left wrist [M25.832]Procedure(s):LEFT DORSAL HAND EXCISION, MASS Number of specimens: 1Name of specimens: Mass from left extensor tendon     References:    Click here for ordering Quick Tip   Question Answer Comment   Procedure Type: Orthopedics    Release to patient Immediate        06/10/24 0926                      Discharge Note    OUTCOME: Patient tolerated treatment/procedure well without complication and is now ready for discharge.    DISPOSITION: Home or Self Care    FINAL DIAGNOSIS:  Mass of left wrist    FOLLOWUP: In clinic in 2 weeks    DISCHARGE INSTRUCTIONS:    Discharge Procedure Orders   Diet general     Call MD for:  temperature >100.4     Call MD for:  persistent nausea and vomiting     Call MD for:  severe uncontrolled pain     Call MD for:  difficulty breathing, headache or visual disturbances     Call MD for:  redness, tenderness, or signs of infection (pain, swelling, redness, odor or green/yellow discharge around incision site)     Call MD for:  hives     Call MD for:  persistent dizziness or light-headedness      Call MD for:  extreme fatigue     Leave dressing on - Keep it clean, dry, and intact until clinic visit     Non weight bearing

## 2024-06-10 NOTE — PLAN OF CARE
Discharge instructions given and explained to patient and friend with verbalization of understanding all instructions. Patient is AAOx3,v/s stable, denies n/v and tolerating po, rates pain level tolerable, IV removed, and family at bedside. Med delivered to bedside. Patient discharged to home with sling. Patient transported off unit via wheelchair to private vehicle with friend.

## 2024-06-10 NOTE — TELEPHONE ENCOUNTER
Care Due:                  Date            Visit Type   Department     Provider  --------------------------------------------------------------------------------                                MYCHART                              FOLLOWUP/OF  [facility]  Last Visit: 04-      FICE VISIT   KOURTNEY García  Next Visit: None Scheduled  None         None Found                                                            Last  Test          Frequency    Reason                     Performed    Due Date  --------------------------------------------------------------------------------    HBA1C.......  6 months...  semaglutide, tirzepatide.  09- 03-    [facility] Embedded Care Due Messages. Reference number: [id number].   6/10/2024 11:32:12 AM CDT

## 2024-06-10 NOTE — H&P
Subjective:       Patient ID: Jose Fox is a 49 y.o. male.     Chief Complaint: Pain of the Left Hand and Pain        HPI  3/4/24  Patient reports clinic today with left hand mass present on the dorsum of the hand that is about 3 cm by 3 cm.  He states it has been present for the past 6 months.  She notes it is bothersome when he is doing any weightlifting activities where his wrist is in extension.  He denies any numbness or tingling.  He denies any previous trauma or surgeries to the wrist or hand. He has not tried bracing throughout the day.      Patient states he works as an  at the [employer]     Interval Update 05/06/24  Pt presents in clinic today to review the results of his US 03/11/24. Pt denies pain, but states that he does experience soreness and hindrance with (over)use at work. Pt reports that he wraps the wrist for gym workouts and is limited with wrist extension. Pt also reports numbness to the fingertips of his right hand.   Mostly in the middle finger  No pain in the right hand  Notices the numbness during day             Past Medical History:   Diagnosis Date    Gout      Hypertension      Hypothyroidism      Thyroid disease              Past Surgical History:   Procedure Laterality Date    CARDIAC ELECTROPHYSIOLOGY STUDY AND ABLATION   2010    LEG SURGERY Left      ROBOT-ASSISTED LAPAROSCOPIC REPAIR OF VENTRAL HERNIA N/A 05/11/2023     Procedure: ROBOTIC REPAIR, HERNIA, VENTRAL;  Surgeon: José Miguel Espino MD;  Location: [facility];  Service: General;  Laterality: N/A;  make second case            Family History   Problem Relation Age of Onset    Hypertension Mother      Arthritis Mother      Hypertension Father      Diabetes Father      Heart disease Father      Drug abuse Son        Social History            Socioeconomic History    Marital status:     Number of children: 3   Occupational History    Occupation: [employer]        Comment: [place]   Tobacco  Use    Smoking status: Never    Smokeless tobacco: Never   Substance and Sexual Activity    Alcohol use: Never    Drug use: Never    Sexual activity: Yes       Partners: Female       Birth control/protection: None   Social History Narrative     23.18     25       Social Determinants of Health           Financial Resource Strain: Low Risk  (11/9/2022)     Overall Financial Resource Strain (CARDIA)      Difficulty of Paying Living Expenses: Not hard at all   Food Insecurity: No Food Insecurity (11/9/2022)     Hunger Vital Sign      Worried About Running Out of Food in the Last Year: Never true      Ran Out of Food in the Last Year: Never true   Transportation Needs: No Transportation Needs (11/9/2022)     PRAPARE - Transportation      Lack of Transportation (Medical): No      Lack of Transportation (Non-Medical): No   Physical Activity: Sufficiently Active (11/9/2022)     Exercise Vital Sign      Days of Exercise per Week: 3 days      Minutes of Exercise per Session: 80 min   Stress: Stress Concern Present (11/9/2022)     [place] [employer] - Occupational Stress Questionnaire      Feeling of Stress : To some extent   Social Connections: Unknown (11/9/2022)     Social Connection and Isolation Panel [NHANES]      Frequency of Communication with Friends and Family: More than three times a week      Frequency of Social Gatherings with Friends and Family: Never      Active Member of Clubs or Organizations: No      Attends Club or Organization Meetings: Patient declined      Marital Status:    Housing Stability: Unknown (11/9/2022)     Housing Stability Vital Sign      Unable to Pay for Housing in the Last Year: No      Unstable Housing in the Last Year: No                Current Outpatient Medications   Medication Sig Dispense Refill    amLODIPine (NORVASC) 10 MG tablet Take 1 tablet (10 mg total) by mouth once daily. 90 tablet 3    buPROPion (WELLBUTRIN XL) 300 MG 24 hr tablet Take 1 tablet  "(300 mg total) by mouth once daily. 30 tablet 11    diclofenac sodium (VOLTAREN) 1 % Gel Apply 2 g topically 4 (four) times daily. 100 g 2    febuxostat (ULORIC) 40 mg Tab Take 1 tablet (40 mg total) by mouth once daily. 90 tablet 3    indomethacin (INDOCIN) 50 MG capsule Take 1 capsule (50 mg total) by mouth every meal as needed (For acute flare-up of gout). 90 capsule 3    losartan-hydrochlorothiazide 100-25 mg (HYZAAR) 100-25 mg per tablet Take 1 tablet by mouth once daily. 90 tablet 0    semaglutide (OZEMPIC) 1 mg/dose (2 mg/1.5 mL) PnIj Inject 1 mg into the skin every 7 days. 4 each 5    sumatriptan (IMITREX) 50 MG tablet Take 1 tablet (50 mg total) by mouth every 4 (four) hours as needed for Migraine. Not to exceed 3 tabs per day 12 tablet 1    tirzepatide (MOUNJARO) 2.5 mg/0.5 mL PnIj Inject 2.5 mg into the skin every 7 days. 12 pen 1      No current facility-administered medications for this visit.      Review of patient's allergies indicates:  No Known Allergies     Review of Systems          Objective:          Vitals:     03/04/24 1031   BP: (!) 146/90   SpO2: (!) 69%   Weight: 119.1 kg (262 lb 9.1 oz)   Height: 6' 2" (1.88 m)      Physical Exam  Cardiovascular:      Pulses:           Radial pulses are Normal on the right side and Normal on the left side.        Bilateral Negative tinels both carpal and cubital, negative Phalan's and negative Durkan's     Hand/Wrist Musculoskeletal Exam     Inspection    Right      Erythema: none      Ecchymosis: none      Edema: none      Deformity: none    Left      Erythema: none      Ecchymosis: none      Edema: none      Deformity: none    Inspection additional comments: 3 x 3 cm mass present on the dorsum of the wrist.     Palpation    Palpation additional comments: Soft mobile mass present the dorsum of the wrist measuring 3 x 3 cm that is mildly tender     Range of Motion    Right Hand      Right hand range of motion is normal.      Left Hand      Left hand range " of motion is normal.       Right Wrist      Right wrist range of motion is normal.      Left Wrist      Left wrist range of motion is slightly limited extension of the left wrist     Neurovascular    Right       Radial pulse: normal      Capillary refill: <3 sec and brisk      Ulnar nerve sensory distribution: normal      Median nerve sensory distribution: decreased sensation      Superficial radial nerve sensory distribution: normal     + Durkins       Left       Radial pulse: normal      Capillary refill: brisk and <3 sec      Ulnar nerve sensory distribution: normal      Median nerve sensory distribution: normal      Superficial radial nerve sensory distribution: normal        Diagnostics Review:   X-Ray: Left Wrist 03/04/24  No acute fracture or dislocation seen.     Subchondral lucencies along the 2nd and 5th MCP joints which can be seen with cyst formation in the setting of degenerative change.  Erosions in the setting of an inflammatory arthritis such as rheumatoid arthritis could have a similar appearance in the appropriate setting.     No significant soft tissue edema or radiopaque retained foreign body.        US 03/11/24 Left Wrist  FINDINGS:  In the region of palpable abnormality is a predominantly solid isoechoic focus measuring 1.7 x 2.2 x 1.1 cm intimately associated with an underlying tendon.  Internal vascularity identified on color images.  It is difficult to determine on these images if this is located within the substance of the tendon or along the tendon sheath.  Considerations would include tenosynovitis versus giant cell tumor of the tendon sheath.  Correlation with MRI without and with contrast would be helpful in further evaluating.        Assessment:       1. Mass of joint of left wrist        Plan:   Excisional biopsy of left dorsal wrist mass. Informed consent obtained in clinic.

## 2024-06-10 NOTE — ANESTHESIA PROCEDURE NOTES
Left supraclavicular single injection    Patient location during procedure: pre-op   Block not for primary anesthetic.  Reason for block: at surgeon's request and post-op pain management   Post-op Pain Location: left hand pain   Start time: 6/10/2024 8:06 AM  Timeout: 6/10/2024 8:05 AM   End time: 6/10/2024 8:10 AM    Staffing  Authorizing Provider: Freddy Martínez MD  Performing Provider: Freddy Martínez MD    Staffing  Performed by: Freddy Martínez MD  Authorized by: Freddy Martínez MD    Preanesthetic Checklist  Completed: patient identified, IV checked, site marked, risks and benefits discussed, surgical consent, monitors and equipment checked, pre-op evaluation and timeout performed  Peripheral Block  Patient position: supine  Prep: ChloraPrep  Patient monitoring: heart rate, cardiac monitor, continuous pulse ox, continuous capnometry and frequent blood pressure checks  Block type: supraclavicular  Laterality: left  Injection technique: single shot  Needle  Needle type: Stimuplex   Needle gauge: 22 G  Needle length: 2 in  Needle localization: anatomical landmarks and ultrasound guidance   -ultrasound image captured on disc.  Assessment  Injection assessment: negative aspiration, negative parasthesia and local visualized surrounding nerve  Paresthesia pain: none  Heart rate change: no  Slow fractionated injection: yes  Pain Tolerance: comfortable throughout block and no complaints  Medications:    Medications: bupivacaine (pf) (MARCAINE) injection 0.5% - Perineural   30 mL - 6/10/2024 8:10:00 AM    Additional Notes  VSS.  [facility] RN monitoring vitals throughout procedure.  Patient tolerated procedure well.     30 mL bupivacaine 0.5% w/ epi 1:200k

## 2024-06-10 NOTE — PLAN OF CARE
Pre Op checklist complete. Pt resting in bed with call light in reach. ALl questions answered. Pe belongings at bedside and plan to place in locker. No family present at this time. Ride home confirmed. Pt still needs anes consent/ update/site janie.

## 2024-06-11 NOTE — ANESTHESIA POSTPROCEDURE EVALUATION
Anesthesia Post Evaluation    Patient: Jose Fox    Procedure(s) Performed: Procedure(s) (LRB):  LEFT DORSAL HAND EXCISION, MASS (Left)    Final Anesthesia Type: general      Patient location during evaluation: PACU  Patient participation: Yes- Able to Participate  Level of consciousness: awake and alert  Post-procedure vital signs: reviewed and stable  Pain management: adequate  Airway patency: patent  LIZETTE mitigation strategies: Multimodal analgesia  PONV status at discharge: No PONV  Anesthetic complications: no      Cardiovascular status: blood pressure returned to baseline  Respiratory status: unassisted  Hydration status: euvolemic  Follow-up not needed.              Vitals Value Taken Time   /90 06/10/24 1016   Temp 36.6 °C (97.9 °F) 06/10/24 0946   Pulse 65 06/10/24 1021   Resp 16 06/10/24 1019   SpO2 97 % 06/10/24 1021   Vitals shown include unfiled device data.      Event Time   Out of Recovery 10:16:00         Pain/Rafael Score: Pain Rating Prior to Med Admin: 0 (6/10/2024  8:10 AM)  Pain Rating Post Med Admin: 0 (6/10/2024  8:10 AM)  Rafael Score: 10 (6/10/2024 10:15 AM)

## 2024-06-12 LAB
FINAL PATHOLOGIC DIAGNOSIS: NORMAL
GROSS: NORMAL
Lab: NORMAL

## 2024-06-12 NOTE — OP NOTE
[facility] Surgery ([facility])  Surgery Department  Operative Note    SUMMARY     Date of Procedure: 6/10/2024     Procedure: Procedure(s) (LRB):  LEFT DORSAL HAND EXCISION, MASS (Left)   Synovectomy in the left 4th dorsal compartment  Surgeons and Role:     * Flores Louis MD - Primary    Assisting Surgeon: Mauricio    Pre-Operative Diagnosis: Carpal tunnel syndrome of right wrist [G56.01]  Mass of joint of left wrist [M25.832]    Post-Operative Diagnosis: Post-Op Diagnosis Codes:     * Carpal tunnel syndrome of right wrist [G56.01]     * Mass of joint of left wrist [M25.832]    Anesthesia: Regional    Technical Procedures Used: surgery    Description of the Findings of the Procedure:  Indication for procedure Mr Fox is a 49-year-old male with significant swelling of his left wrist failed conservative treatment after much discussion with the patient elected for surgical intervention risks and benefits were explained the patient in clinic consents were signed in clinic     Procedure in detail after the correct site was marked with the patient's participation holding area the patient underwent regional anesthesia was brought to the operating placed in supine position underwent MAC anesthesia well-padded nonsterile tourniquet was placed on the left upper extremity incision was marked out over the dorsal surface after the arm was prepped and draped normal sterile fashion and a time-out was conducted for the correct procedure to be indicated IV antibiotics given patient preoperatively the arm was elevated tourniquet insufflated 250 mmHg incision was made careful dissection down to the tendons the tendons were completely debrided there was significant amount of fibrinous material around the tendons not quite a cyst but it was needed to be debrided each tendon in the compartment the incision was actually extended proximally because there was such significant amount of what appeared to be granulomatous material and  again multiple cultures were taken this pathology was also sent to pathology for culture and specimen once this was completed the retinaculum was closed Vicryl close the deep tissue sutures closed the skin sterile dressing was applied patient was placed in a well-padded splint tolerated suture was brought to cover room in stable condition     Postop plans patient keep the dressing clean dry and intact to the patient back in 2 weeks' time pathology report be given to patient therapy to be initiated    Significant Surgical Tasks Conducted by the Assistant(s), if Applicable: retraction    Complications: No    Estimated Blood Loss (EBL): * No values recorded between 6/10/2024  9:11 AM and 6/10/2024  9:43 AM *           Implants: * No implants in log *    Specimens:   Specimen (24h ago, onward)      None                    Condition: Good    Disposition: PACU - hemodynamically stable.    Attestation: I performed the procedure.    Discharge Note    SUMMARY     Admit Date: 6/10/2024    Discharge Date and Time: 6/10/2024 10:30 AM    Hospital Course (synopsis of major diagnoses, care, treatment, and services provided during the course of the hospital stay): surgery     Final Diagnosis: Post-Op Diagnosis Codes:     * Carpal tunnel syndrome of right wrist [G56.01]     * Mass of joint of left wrist [M25.832]    Disposition: Home or Self Care    Follow Up/Patient Instructions:     Medications:  Reconciled Home Medications:      Medication List        START taking these medications      traMADoL 50 mg tablet  Commonly known as: ULTRAM  Take 1 tablet (50 mg total) by mouth every 6 (six) hours as needed for Pain.            CONTINUE taking these medications      amLODIPine 10 MG tablet  Commonly known as: NORVASC  Take 1 tablet (10 mg total) by mouth once daily.     diclofenac sodium 1 % Gel  Commonly known as: VOLTAREN  Apply 2 g topically 4 (four) times daily.     febuxostat 40 mg Tab  Commonly known as: ULORIC  Take 1 tablet (40  mg total) by mouth once daily.     losartan-hydrochlorothiazide 100-25 mg 100-25 mg per tablet  Commonly known as: HYZAAR  Take 1 tablet by mouth once daily.     MOUNJARO 2.5 mg/0.5 mL Pnij  Generic drug: tirzepatide  Inject 2.5 mg into the skin every 7 days.     semaglutide 1 mg/dose (2 mg/1.5 mL) Pnij  Commonly known as: OZEMPIC  Inject 1 mg into the skin every 7 days.            Discharge Procedure Orders   Diet general     Call MD for:  temperature >100.4     Call MD for:  persistent nausea and vomiting     Call MD for:  severe uncontrolled pain     Call MD for:  difficulty breathing, headache or visual disturbances     Call MD for:  redness, tenderness, or signs of infection (pain, swelling, redness, odor or green/yellow discharge around incision site)     Call MD for:  hives     Call MD for:  persistent dizziness or light-headedness     Call MD for:  extreme fatigue     Leave dressing on - Keep it clean, dry, and intact until clinic visit     Non weight bearing

## 2024-06-17 ENCOUNTER — PATIENT MESSAGE (OUTPATIENT)
Dept: PRIMARY CARE CLINIC | Facility: CLINIC | Age: 49
End: 2024-06-17
Payer: COMMERCIAL

## 2024-06-18 DIAGNOSIS — I10 ESSENTIAL HYPERTENSION: ICD-10-CM

## 2024-06-18 RX ORDER — LOSARTAN POTASSIUM AND HYDROCHLOROTHIAZIDE 25; 100 MG/1; MG/1
1 TABLET ORAL DAILY
Qty: 90 TABLET | Refills: 3 | Status: SHIPPED | OUTPATIENT
Start: 2024-06-18 | End: 2025-06-18

## 2024-06-18 NOTE — TELEPHONE ENCOUNTER
No care due was identified.  [facility] Embedded Care Due Messages. Reference number: [id number].   6/18/2024 11:46:24 AM CDT

## 2024-06-20 NOTE — TELEPHONE ENCOUNTER
Unfortunately I am not familiar with risk or benefits of that medication. A urologist would likely be more familiar.

## 2024-06-24 ENCOUNTER — OFFICE VISIT (OUTPATIENT)
Dept: ORTHOPEDICS | Facility: CLINIC | Age: 49
End: 2024-06-24
Payer: COMMERCIAL

## 2024-06-24 VITALS — WEIGHT: 253.06 LBS | HEIGHT: 74 IN | BODY MASS INDEX: 32.48 KG/M2

## 2024-06-24 DIAGNOSIS — Z98.890 POST-OPERATIVE STATE: ICD-10-CM

## 2024-06-24 DIAGNOSIS — M10.00 IDIOPATHIC GOUT, UNSPECIFIED CHRONICITY, UNSPECIFIED SITE: ICD-10-CM

## 2024-06-24 DIAGNOSIS — M25.832 MASS OF JOINT OF LEFT WRIST: Primary | ICD-10-CM

## 2024-06-24 PROCEDURE — 99024 POSTOP FOLLOW-UP VISIT: CPT | Mod: S$GLB,,,

## 2024-06-24 PROCEDURE — 99999 PR PBB SHADOW E&M-EST. PATIENT-LVL III: CPT | Mod: PBBFAC,,,

## 2024-06-24 PROCEDURE — 1160F RVW MEDS BY RX/DR IN RCRD: CPT | Mod: CPTII,S$GLB,,

## 2024-06-24 PROCEDURE — 1159F MED LIST DOCD IN RCRD: CPT | Mod: CPTII,S$GLB,,

## 2024-06-24 NOTE — PROGRESS NOTES
"  Mr. Fox is here today for a post-operative visit.  He is 14 days status post left dorsal wrist mass excision by Dr. Louis on 6/10/2024.  Pain is 6/10.  He is  taking pain medication as needed.  He denies fever, chills, and sweats since the time of the surgery. He denies any numbness or tingling.     Physical exam:    Vitals:    06/24/24 1028   Weight: 114.8 kg (253 lb 1.4 oz)   Height: 6' 2" (1.88 m)     Vital signs are stable, patient is afebrile.  Patient is well dressed and well groomed, no acute distress.  Alert and oriented to person, place, and time.  Post op dressing taken down.  Incision is clean, dry and intact.  There is no erythema or exudate.  There is no sign of any infection. He is NVI. Sutures removed without difficulty.  Patient was unable to make a full composite fist. Moderate generalized edema of left hand    Assessment:   s/p left dorsal wrist mass excision         Plan:  Jose Vigil" was seen today for post-op evaluation.    Diagnoses and all orders for this visit:    Mass of joint of left wrist  -     Ambulatory referral/consult to Physical/Occupational Therapy; Future  -     Ambulatory referral/consult to Rheumatology; Future    Post-operative state  -     Ambulatory referral/consult to Rheumatology; Future        - PO instruction reviewed and provided to patient  Educated patient on scar massage to be done several times a day to prevent scar adhesions.   Pathology report reviewed and discussed with patient:   SPECIMEN FROM LEFT EXTENSOR TENDON:   MODERATELY INTENSE CHRONIC SYNOVITIS   DEPOSITS CONSISTENT TYPICAL OF GOUTY URIC ACID DEPOSITS   Occupational therapy ordered. Will have in come in sooner to help with edema and ROM this week.  Applied titan wrist brace to be worn day and night.  Discussed hand/arm elevate to decrease swelling.  Discussed non weight bearing precautions for 4 weeks.  Discussed with patient to not wear sling full time to prevent elbow stiffness.  He has a " week FU to assess swelling/ROM. 7/1

## 2024-06-25 ENCOUNTER — PATIENT MESSAGE (OUTPATIENT)
Dept: ORTHOPEDICS | Facility: CLINIC | Age: 49
End: 2024-06-25
Payer: COMMERCIAL

## 2024-06-26 NOTE — PROGRESS NOTES
"  Mr. Fox is here today for a post-operative visit.  He is 14 days status post left dorsal wrist mass excision by Dr. Louis on 6/10/2024.  Pain is 6/10.  He is  taking pain medication as needed.  He denies fever, chills, and sweats since the time of the surgery. He denies any numbness or tingling.     Physical exam:    Vitals:    06/24/24 1028   Weight: 114.8 kg (253 lb 1.4 oz)   Height: 6' 2" (1.88 m)     Vital signs are stable, patient is afebrile.  Patient is well dressed and well groomed, no acute distress.  Alert and oriented to person, place, and time.  Post op dressing taken down.  Incision is clean, dry and intact.  There is no erythema or exudate.  There is no sign of any infection. He is NVI. Sutures removed without difficulty.  Patient was unable to make a full composite fist. Moderate generalized edema of left hand    Assessment:   s/p left dorsal wrist mass excision         Plan:  Jose Vigil" was seen today for post-op evaluation.    Diagnoses and all orders for this visit:    Mass of joint of left wrist  -     Ambulatory referral/consult to Physical/Occupational Therapy; Future  -     Ambulatory referral/consult to Rheumatology; Future    Post-operative state  -     Ambulatory referral/consult to Rheumatology; Future    Idiopathic gout, unspecified chronicity, unspecified site        - PO instruction reviewed and provided to patient  Educated patient on scar massage to be done several times a day to prevent scar adhesions.   Pathology report reviewed and discussed with patient:   SPECIMEN FROM LEFT EXTENSOR TENDON:   MODERATELY INTENSE CHRONIC SYNOVITIS   DEPOSITS CONSISTENT TYPICAL OF GOUTY URIC ACID DEPOSITS   Occupational therapy ordered. Will have in come in sooner to help with edema and ROM this week.  Applied titan wrist brace to be worn day and night.  Discussed hand/arm elevate to decrease swelling.  Discussed non weight bearing precautions for 4 weeks.  Discussed with patient to not " wear sling full time to prevent elbow stiffness.  He has a week FU to assess swelling/ROM. 7/1

## 2024-06-27 ENCOUNTER — CLINICAL SUPPORT (OUTPATIENT)
Dept: REHABILITATION | Facility: HOSPITAL | Age: 49
End: 2024-06-27
Payer: COMMERCIAL

## 2024-06-27 DIAGNOSIS — M25.632 DECREASED RANGE OF MOTION OF LEFT WRIST: ICD-10-CM

## 2024-06-27 DIAGNOSIS — M25.642 DECREASED RANGE OF MOTION OF FINGER OF LEFT HAND: ICD-10-CM

## 2024-06-27 DIAGNOSIS — M25.832 MASS OF JOINT OF LEFT WRIST: ICD-10-CM

## 2024-06-27 DIAGNOSIS — M25.532 LEFT WRIST PAIN: Primary | ICD-10-CM

## 2024-06-27 PROCEDURE — 97166 OT EVAL MOD COMPLEX 45 MIN: CPT

## 2024-06-27 PROCEDURE — 97140 MANUAL THERAPY 1/> REGIONS: CPT

## 2024-06-27 PROCEDURE — 97110 THERAPEUTIC EXERCISES: CPT

## 2024-06-27 NOTE — PLAN OF CARE
[facility]  Initial Evaluation     Date: 6/27/2024  Patient: Jose Fox  Chart Number: [id number]    Therapy Diagnosis:   1. Left wrist pain        2. Mass of joint of left wrist  Ambulatory referral/consult to Physical/Occupational Therapy      3. Decreased range of motion of left wrist        4. Decreased range of motion of finger of left hand          Medical Diagnosis: M25.832 (ICD-10-CM) - Mass of joint of left wrist    Referring Physician: Violeta Pacheco NP  Physician Orders: Eval and Treat  Note:   Left wrist ganglion cyst excision  Date of Return to MD: 7/1/24    Date of Surgery: 6/10/24  Left dorsal wrist ganglion cyst excision    Evaluation Date: 6/27/2024  Authorization Period: 6/24/24 - 6/24/25  Plan of Care Expiration: 9/19/24  Visit #/ Visits Authorized: 1 of 1  FOTO Completion: Initial eval (6/27/2024)  FOTO #2:  FOTO #3:    Time In: 9:02 am  Time Out: 10:05 am  Total Billable Time: 63 min    Precautions: Standard    Subjective     History of Current Condition: Jose Fox is a 49 y.o. year old Right hand dominant male who underwent mass excision of the Left wrist on 6/10/24. He presents today with increased edema and decreased ROM about digits and wrist. He has been wearing a prefab wrist brace at all times but notes increased swelling by midday, pooling in digits and dorsal MP just distally to the edge of the splint. Jose Fox is referred to Occupational Therapy for evaluation and treatment. Patient presents today alone.    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  Previous Therapy: NA    Pain:  Functional Pain Scale Rating 0-10:   Current: 0/10  At Best: 0/10  At  Worst: 5/10    Location: Left wrist  Description: throbbing  Aggravating Factors: walking around with arm by side, ROM  Easing Factors: rest, elevating hand    Functional Limitations/Social History:  Prior Level of Function: Independent with all ADLs/IADLs    Current Level of Function:   ADLs: difficulty donning pants/socks, increased time to complete  IADLs: able to complete but increased time and with difficulty, using dominant RUE - cooking smaller meals  Leisure: working out, cook  Driving: Yes    Occupation:   at [employer]  Working presently: employed  Duties: managing 7 kinsey, walking, computer work     Patient's Goals for Therapy: to regain motion and strength    Past Medical History/Physical Systems Review:   Jose Fox  has a past medical history of Gout, Heart attack, Hypertension, Hypothyroidism, and Thyroid disease.    Jose Fox  has a past surgical history that includes Leg Surgery (Left); Cardiac electrophysiology study and ablation (2010); Robot-assisted laparoscopic repair of ventral hernia (N/A, 05/11/2023); and Excision of mass of hand (Left, 6/10/2024).    Jose has a current medication list which includes the following prescription(s): amlodipine, bupropion, diclofenac sodium, febuxostat, indomethacin, losartan-hydrochlorothiazide 100-25 mg, semaglutide, sumatriptan, mounjaro, and tramadol.    Review of patient's allergies indicates:  No Known Allergies       Objective     Mental status: alert, oriented x3    Observation/Appearance:   Moderate swelling throughout digits and dorsal hand/wrist  Incision about dorsal wrist, fully closed, limited mobility    Sensation: Patient denies numbness/tingling      Edema:  (Measured circumferential, in centimeters)  Hand/Wrist: Right  6/27/2024 Left  6/27/2024   Wrist Crease 18.4 cm 19 cm   DPC 22.5 cm 22.5 cm       ELBOW, WRIST RANGE OF MOTION:   Measured in degrees of active motion with goniometer   Right  6/27/2024 Left  6/27/2024    Elbow Extension/Flexion WNL WNL   Pronation/Supination 80/60 70/50   Wrist Extension/Flexion 60/45 35/15   Ulnar/Radial Deviation 25/25 15/15   Composite Wrist Flexion 20 10       Digit AROM:  Measured in degrees of active motion with goniometer and/or distance measured from finger tip to the distal palmar crease (DPC)   Left  6/27/2024       Index: MP  -17/47              PIP     -10/90              DIP 0/46              GARCIA 156       Long:  MP -20/60              PIP -20/92              DIP 0/52              GARCIA 164       Ring:   MP -23/63              PIP -14/90              DIP 0/60              GARCIA 176       Small:  MP -5/55               PIP -8/80               DIP 0/63              GARCIA 185       Thumb: MP                 IP        Rad ADD/ABD        Pal ADD/ABD        Opposition DPC       STRENGTH: Not tested due to precautions  (Measured in pounds using a Dynamometer and pinch meter)   Right   Left      Setting 2      Average     Key     3 Pt     Tip         Intake Outcome Measure for FOTO Initial Evaluation Survey    Therapist reviewed FOTO scores for Jose Fox on 6/27/2024.   FOTO documents entered into [employer]fix - see Media section.    Intake Score: 47%         Treatment     Treatment Time In: 11:35 am  Treatment Time Out: 10:05 am  Total Treatment time separate from Evaluation time: 30 min      Sylvester received the following manual therapy techniques for 10 minutes:   - STM to scar and surrounding area, combined with isolated digit and wrist ROM  - Instructed to perform 3 min, 3x/day      Sylvester performed therapeutic exercises for 20 minutes including:  - AROM tendon glides, ab/adduction, lifts  - AROM pro/sup, wrist flex/ext (open and close fist), UD/RD  - Full fist with wedges  - Applied KT EDF strips on dorsal digits 2-5, and EDF across dorsal hand  - Instructed in precautions of no heavy lifting/gripping  - Instructed in pumping of elbow/hand while walking, and elevation at rest      Patient  Education and Home Exercises     Patient/Family Education Provided:   - Role of OT, goals for OT, scheduling/cancellations - pt verbalized understanding. Discussed insurance limitations with patient.    Written Home Exercises Provided: yes.  Exercises were reviewed and Sylvester was able to demonstrate them prior to the end of the session.  Sylvester demonstrated good  understanding of the education provided. See EMR under Patient Instructions for exercises provided during therapy sessions.     Pt was advised to perform these exercises free of pain, and to stop performing them if pain occurs.      Assessment     Jose Fox is a 49 y.o. male referred to outpatient occupational therapy and presents with a medical diagnosis of M25.832 (ICD-10-CM) - Mass of joint of left wrist. Patient presents with pain, limited scar mobility and adhesions, edema, decreased digit and wrist ROM and strength interfering with functional use in daily activities.  Jose Fox will benefit from continued skilled OT services to progress with the above deficits and facilitate increased functional use of LUE.    Appears to have adhesions of EDC of all digits (limited extension with puckering of scar)    Following medical record review it is determined that pt will benefit from occupational therapy services in order to maximize pain free and/or functional use of left wrist/hand. The following goals were discussed with the patient and patient is in agreement with them as to be addressed in the treatment plan. The patient's rehab potential is Good.     Anticipated barriers to occupational therapy: none    Plan of care discussed with patient: Yes    Patient's spiritual, cultural and educational needs considered and patient is agreeable to the plan of care and goals as stated below:     Medical Necessity is demonstrated by the following  Occupational Profile/History  Co-morbidities and personal factors that may impact the plan of care [] LOW: Brief chart  review  [x] MODERATE: Expanded chart review   [] HIGH: Extensive chart review    Moderate / High Support Documentation: PMH, personal factors, op note/imaging     Examination  Performance deficits relating to physical, cognitive or psychosocial skills that result in activity limitations and/or participation restrictions  [] LOW: addressing 1-3 Performance deficits  [] MODERATE: 3-5 Performance deficits  [x] HIGH: 5+ Performance deficits (please support below)    Moderate / High Support Documentation:    Physical:  Joint Mobility  Muscle Power/Strength  Skin Integrity/Scar Formation  Edema   Strength  Fine Motor Coordination  Pain    Cognitive:  No Deficits    Psychosocial:    Habits  Routines     Treatment Options [] LOW: Limited options  [x] MODERATE: Several options  [] HIGH: Multiple options      Decision Making/ Complexity Score: moderate         The following goals were discussed with the patient and patient is in agreement with them as to be addressed in the treatment plan.     Long Term Goals (to be met by discharge):  Date Goal Met:     1.) Jose Fox will demonstrate significantly improved functional performance from re-assessment as measured by a FOTO Intake score of more than 67.    Goal Status:   In progress    2.) Jose Fox will return to near to prior level of function for ADLs and household management reporting independence or modified independence.    Goal Status:   In progress    3. Jose Fox will report pain 2 out of 10 at worst to increase functional use of affected hand for work and leisure tasks.    Goal Status:   In progress     Short Term Goals (to be met by 7/25/24):  Date Goal Met:     Jose Fox will be independent with home exercise program with written instructions.    Goal Status:   In progress    Jose Fox will demonstrate 220 degrees of GARCIA of all digits to improve functional performance in ADLs/work/leisure tasks.    Goal Status:   In progress    Jose  Ruddy will demonstrate 45/45 degrees of wrist ext/flex to improve functional performance in ADLs/work/leisure tasks.    Goal Status:   In progress    Jose Fox will demonstrate within 20 lbs of  strength compared to unaffected hand to improve functional grasp for ADLs/work/leisure tasks.    Goal Status:   In progress    oJse Fox will demonstrate the ability to complete ADL/IADL tasks with 4/10 pain.    Goal Status:   In progress    Jose Fox will be able to resume significantly greater occupational roles independently or modified as demonstrated by a FOTO Intake score of more than 57.    Goal Status:   In progress       Plan     Pt to be treated by Occupational Therapy 3 times per week for 12 weeks during the certification period from 6/27/2024 to 9/19/24 to achieve the established goals.     Treatment to include: Paraffin, Fluidotherapy, Manual therapy/joint mobilizations, Modalities for pain management, US 3 mhz, Therapeutic exercises/activities., Iontophoresis with 2.0 cc Dexamethasone, Strengthening, Orthotic Fabrication/Fit/Training, Edema Control, Scar Management, Wound Care, Electrical Modalities, Joint Protection, and Energy Conservation, as well as any other treatments deemed necessary based on the patient's needs or progress.       Hallie Ortiz, OTR/L, CHT

## 2024-06-27 NOTE — PATIENT INSTRUCTIONS
"  [facility], OCCUPATIONAL THERAPY  HOME EXERCISE PROGRAM      Use your hand for light activity and try actively pumping during walking  NO heavy gripping or lifting at this time    Complete scar massage, 3-5 minutes, 3 times a day:                - Use enough pressure to move the skin in all directions  - Massage for 5 min, 3x/day      Complete the following exercises for 10 repetitions, 5x/day:     AROM: Supination / Pronation   With your elbow by your side, turn your   palm up then turn your palm down.          AROM: Wrist Flexion / Extension               Bend your wrist forward and back as far as possible.  Perform 10 reps with fingerrs straight  Perform 10 reps with fingers in a fist        AROM: Wrist Radial / Ulnar Deviation  Position hand flat on the table.  Bend your wrist from side to side as far as possible.  (Sliding side to side like Mantex wipers)        AROM Hook Fist  - Bend only your middle and end knuckles, creating a "hook" (as if reaching to touch the base of your finger). Hold 2-3 seconds.   - Then straighten your fingers, pointing towards the ceiling.         AROM Table Top ("Wave")   - Bend only your large, bottom knuckles. Keep the tips of your fingers straight and flat like a table top. Hold 3 seconds.   - Then straighten your fingers, pointing towards the ceiling.           AROM Straight Fist  - Bend your bottom and middle knuckles, keeping the tips of your fingers straight, as if pointing towards your elbow  - Try to touch the bottom of your palm with the pads of your fingers. Hold 3 seconds.   - Then straighten your fingers, pointing towards the ceiling.          AROM Full Fist  - Bend every joint in your hand into a fist. Hold 3 seconds.   - Then straighten your fingers, pointing towards the ceiling.    (You can place the blue foam wedges between fingers)      AROM: Abduction / Adduction  With hand flat on table, spread all fingers apart,   then bring them together " as close as possible.      Therapist: Hallie Ortiz OTR/L     Copyright © I. All rights reserved.

## 2024-06-30 ENCOUNTER — PATIENT MESSAGE (OUTPATIENT)
Dept: ORTHOPEDICS | Facility: CLINIC | Age: 49
End: 2024-06-30
Payer: COMMERCIAL

## 2024-06-30 NOTE — PROGRESS NOTES
"Interval HPI 07/01/2024  Patient Is here for one-week range-of-motion check and swelling status post left dorsal wrist mass excision by Dr. Jonh cunningham on 06/10/2024.  He denies any pain today, 0/10.  Denies any numbness or tingling.  Reports he is no longer using his brace as recommended as recommended per occupational therapy to help improve his range of motion.  He reports improvement since last visit where he is able to make a fist now but continues to have decreased range in extension of his ring/small finger.  He has been going to physical therapy 3 times a week.  Has started going to the gym.  Has not heard back from Rheumatology.      06/24/2024  Mr. Fox is here today for a post-operative visit.  He is 14 days status post left dorsal wrist mass excision by Dr. Louis on 6/10/2024.  Pain is 6/10.  He is  taking pain medication as needed.  He denies fever, chills, and sweats since the time of the surgery. He denies any numbness or tingling.     Physical exam:    There were no vitals filed for this visit.    Vital signs are stable, patient is afebrile.  Patient is well dressed and well groomed, no acute distress.  Alert and oriented to person, place, and time.  Healed dorsal left wrist incision.  Incision is clean, dry and intact.  There is no erythema or exudate.  There is no sign of any infection. He is NVI.   Patient was able to make a full composite fist. Mild generalized edema of left hand, improvement since last visit.  Decreased range of motion in flexion of the left ring finger and small finger.    Assessment:   s/p left dorsal wrist mass excision       Jose Vigil" was seen today for post-op evaluation.    Diagnoses and all orders for this visit:    Post-operative state    Mass of joint of left wrist    Idiopathic gout, unspecified chronicity, unspecified site       Plan:        Continue scar massage to be done several times a day to prevent scar adhesions.   Since last visit he has improvement " in his range of motion of his left wrist and fingers.  He also has improvement in his swelling.  Discussed with patient he should continue doing occupational therapy..  Discussed slow progression with weight-bearing but as tolerated and with the guidance of occupational therapy.  And provided patient with the main line phone number to reach out to Rheumatology for follow-up to manage his gout to prevent other gout flare-ups.  He will follow-up with me in 3 weeks for his 6 week postop visit.

## 2024-07-01 ENCOUNTER — OFFICE VISIT (OUTPATIENT)
Dept: ORTHOPEDICS | Facility: CLINIC | Age: 49
End: 2024-07-01
Payer: COMMERCIAL

## 2024-07-01 VITALS — WEIGHT: 253.06 LBS | HEIGHT: 74 IN | BODY MASS INDEX: 32.48 KG/M2

## 2024-07-01 DIAGNOSIS — Z98.890 POST-OPERATIVE STATE: Primary | ICD-10-CM

## 2024-07-01 DIAGNOSIS — M25.832 MASS OF JOINT OF LEFT WRIST: ICD-10-CM

## 2024-07-01 DIAGNOSIS — M10.00 IDIOPATHIC GOUT, UNSPECIFIED CHRONICITY, UNSPECIFIED SITE: ICD-10-CM

## 2024-07-01 PROCEDURE — 99999 PR PBB SHADOW E&M-EST. PATIENT-LVL III: CPT | Mod: PBBFAC,,,

## 2024-07-02 NOTE — PROGRESS NOTES
"  [facility]  Occupational Therapy Treatment Note    Date: 7/3/2024  Name: Jose Fox  Clinic Number: [id number]    Therapy Diagnosis: No diagnosis found.  Physician: Violeta Pacheco NP    Referring Physician: Violeta Pacheco NP  Physician Orders: Eval and Treat  Note:   Left wrist ganglion cyst excision  Date of Return to MD: 7/1/24     Date of Surgery: 6/10/24  Left dorsal wrist ganglion cyst excision     Evaluation Date: 6/27/2024  Authorization Period: 6/24/24 - 6/24/25  Plan of Care Expiration: 9/19/24  Visit #/ Visits Authorized: 1 of 1  FOTO Completion: Initial eval (6/27/2024)  Precautions:  Standard    Time In: 9:30 am  Time Out: 10:30  Total Billable Time: 55 minutes      SUBJECTIVE   History of Current Condition: Jose Fox is a 49 y.o. year old Right hand dominant male who underwent mass excision of the Left wrist on 6/10/24.   Pt reports: continued compliance with home exercise program   Response to previous treatment:improving arom  Functional change: Nothing new reported. Questioned if he can return to resistive exercises at the gym with L UE. He was re-instructed in current redistrictions to that at this time.    Pain: 0/10 "Just tightness" "If I do get pain it is in the pinky" and indicated sx site as well.   Location: left sx site and small finger    OBJECTIVE   Objective Measures updated at progress report unless specified.    Observation/Appearance:   Moderate swelling throughout digits and dorsal hand/wrist  Incision about dorsal wrist, fully closed, limited mobility Edema appeared less after lymphatic drainage      Sensation: Patient denies numbness/tingling        Edema:  (Measured circumferential, in centimeters)  Hand/Wrist: Right  6/27/2024 Left  6/27/2024   Wrist Crease 18.4 cm 19 cm   DPC 22.5 cm 22.5 cm         ELBOW, WRIST RANGE OF MOTION:   Measured in degrees of active motion with goniometer    Right  6/27/2024 Left  6/27/2024   Elbow " Extension/Flexion WNL WNL   Pronation/Supination 80/60 70/50   Wrist Extension/Flexion 60/45 35/15   Ulnar/Radial Deviation 25/25 15/15   Composite Wrist Flexion 20 10         Digit AROM:  Measured in degrees of active motion with goniometer and/or distance measured from finger tip to the distal palmar crease (DPC)    Left  6/27/2024         Index: MP  -17/47              PIP     -10/90              DIP 0/46              GARCIA 156         Long:  MP -20/60              PIP -20/92              DIP 0/52              GARCIA 164         Ring:   MP -23/63              PIP -14/90              DIP 0/60              GARCIA 176         Small:  MP -5/55               PIP -8/80               DIP 0/63              GARCIA 185         Thumb: MP                  IP         Rad ADD/ABD         Pal ADD/ABD         Opposition DPC           Treatment     Sylvester received the treatments listed below:   Supervised modalities after being cleared for contradictions: Fluidotherapy - x 10 min to wrist and hand to increase soft tissue extensibility     Sylvester performed therapeutic exercises for 35 minutes including:  - PROM LLPS: hook x 20+ reps  -AAROM Hook x 20 reps  - AROM waves, fist, straight fist 20 reps ea  - AROM  finger lifts over towel with scar mobilization 20 resp  - AROM  wrist flex/ext (open and fisted), UD/RD 20 each  - Full fist with wedges (Not Today)  - AROM wrist f/e , fa sup/pro with wheel x 3 min each  AROM wrist over wedge and with FA in 3 planes, 1 min each  - soft sponge squeezing and in-hand manipulation 20 reps.- Issued for HEP 20 reps, 4x/day  - Re-iterated  precautions of no heavy lifting/gripping    Manual Therapy: 10 min to address adhesions and edema  DFM   Scar mobilization (Instructed in technique to be performed during finger lifts over towel roll  Lymphatic drainage. (Instructed in technique)  Fit with compression glove (Large) and instructed to wear it when possible.  - Applied KT EDF strips on dorsal digits 2-5, and EDF  across dorsal hand (Not Today)    Patient Education and Home Exercises      Education provided:   - Added compression gloves   - yellow sponge   - Progress towards goals     Written Home Exercises Provided: Patient instructed to cont prior HEP.  Exercises were reviewed and Sylvester was able to demonstrate them prior to the end of the session.  Sylvester demonstrated good  understanding of the HEP provided. See EMR under Patient Instructions for exercises provided during therapy sessions.      ASSESSMENT      3 wks, 2 days s/p  Significant stiffness of wrist and fingers. Adherent scarring limiting finger extension  Issued compression glove and  sponge squeezing to further address edema and stiffenss.Required some Re-instructed in technique of HEP.   Sylvester is progressing  towards his goals and there are no updates to goals at this time. Pt prognosis is Good.     Pt will continue to benefit from skilled outpatient occupational therapy to address the deficits listed in the problem list on initial evaluation, provide pt/family education and to maximize pt's level of independence in the home and community environment.     Pt's spiritual, cultural and educational needs considered and pt agreeable to plan of care and goals.    Anticipated barriers to occupational therapy: Gout    Goals:  Long Term Goals (to be met by discharge):  Date Goal Met:       1.) Jose Fox will demonstrate significantly improved functional performance from re-assessment as measured by a FOTO Intake score of more than 67.     Goal Status:   In progress     2.) Jose Fox will return to near to prior level of function for ADLs and household management reporting independence or modified independence.     Goal Status:   In progress     3. Jose Fox will report pain 2 out of 10 at worst to increase functional use of affected hand for work and leisure tasks.     Goal Status:   In progress      Short Term Goals (to be met by 7/25/24):  Date Goal Met:        Jose Fxo will be independent with home exercise program with written instructions.     Goal Status:   In progress     Jose Fox will demonstrate 220 degrees of GARCIA of all digits to improve functional performance in ADLs/work/leisure tasks.     Goal Status:   In progress     Jose Fox will demonstrate 45/45 degrees of wrist ext/flex to improve functional performance in ADLs/work/leisure tasks.     Goal Status:   In progress     Jose Fox will demonstrate within 20 lbs of  strength compared to unaffected hand to improve functional grasp for ADLs/work/leisure tasks.     Goal Status:   In progress     Jose Fox will demonstrate the ability to complete ADL/IADL tasks with 4/10 pain.     Goal Status:   In progress     Jose Fox will be able to resume significantly greater occupational roles independently or modified as demonstrated by a FOTO Intake score of more than 57.     Goal Status:   In progress           PLAN     Continue skilled occupational therapy with individualized plan of care focusing on arom, and manual therapy     Updates/Grading for next session: progress as tolerated.    Kareen Lyman, OT

## 2024-07-03 ENCOUNTER — CLINICAL SUPPORT (OUTPATIENT)
Dept: REHABILITATION | Facility: HOSPITAL | Age: 49
End: 2024-07-03
Payer: COMMERCIAL

## 2024-07-03 DIAGNOSIS — M25.632 DECREASED RANGE OF MOTION OF LEFT WRIST: ICD-10-CM

## 2024-07-03 DIAGNOSIS — M25.642 DECREASED RANGE OF MOTION OF FINGER OF LEFT HAND: ICD-10-CM

## 2024-07-03 DIAGNOSIS — M25.532 LEFT WRIST PAIN: Primary | ICD-10-CM

## 2024-07-03 PROCEDURE — 97110 THERAPEUTIC EXERCISES: CPT

## 2024-07-03 PROCEDURE — 97022 WHIRLPOOL THERAPY: CPT

## 2024-07-03 PROCEDURE — 97140 MANUAL THERAPY 1/> REGIONS: CPT

## 2024-07-04 DIAGNOSIS — E66.9 OBESITY (BMI 30.0-34.9): ICD-10-CM

## 2024-07-04 DIAGNOSIS — R73.03 PREDIABETES: ICD-10-CM

## 2024-07-04 NOTE — TELEPHONE ENCOUNTER
No care due was identified.  [facility] Embedded Care Due Messages. Reference number: [id number].   7/04/2024 2:08:30 PM CDT

## 2024-07-05 ENCOUNTER — TELEPHONE (OUTPATIENT)
Dept: ORTHOPEDICS | Facility: CLINIC | Age: 49
End: 2024-07-05
Payer: COMMERCIAL

## 2024-07-05 RX ORDER — TIRZEPATIDE 2.5 MG/.5ML
2.5 INJECTION, SOLUTION SUBCUTANEOUS
Qty: 12 PEN | Refills: 1 | Status: SHIPPED | OUTPATIENT
Start: 2024-07-05 | End: 2025-01-01

## 2024-07-09 ENCOUNTER — CLINICAL SUPPORT (OUTPATIENT)
Dept: REHABILITATION | Facility: HOSPITAL | Age: 49
End: 2024-07-09
Payer: COMMERCIAL

## 2024-07-09 DIAGNOSIS — M25.642 DECREASED RANGE OF MOTION OF FINGER OF LEFT HAND: ICD-10-CM

## 2024-07-09 DIAGNOSIS — M25.632 DECREASED RANGE OF MOTION OF LEFT WRIST: ICD-10-CM

## 2024-07-09 DIAGNOSIS — M25.532 LEFT WRIST PAIN: Primary | ICD-10-CM

## 2024-07-09 PROCEDURE — 97022 WHIRLPOOL THERAPY: CPT

## 2024-07-09 PROCEDURE — 97140 MANUAL THERAPY 1/> REGIONS: CPT

## 2024-07-09 PROCEDURE — 97110 THERAPEUTIC EXERCISES: CPT

## 2024-07-09 NOTE — PROGRESS NOTES
"  [facility]  Occupational Therapy Treatment Note    Date: 7/9/2024  Name: Jose Fox  Clinic Number: [id number]    Therapy Diagnosis:   Encounter Diagnoses   Name Primary?    Left wrist pain Yes    Decreased range of motion of left wrist     Decreased range of motion of finger of left hand      Physician: Violeta Pacheco NP    Referring Physician: Violeta Pacheco NP  Physician Orders: Eval and Treat  Note:   Left wrist ganglion cyst excision  Date of Return to MD: 7/1/24     Date of Surgery: 6/10/24  Left dorsal wrist ganglion cyst excision     Evaluation Date: 6/27/2024  Authorization Period: 6/24/24 - 6/24/25  Plan of Care Expiration: 9/19/24  Visit #/ Visits Authorized: 1 of 1  FOTO Completion: Initial eval (6/27/2024)  Precautions:  Standard    Time In: 8:35 am  Time Out: 9:40 am  Total Billable Time: 60 minutes      SUBJECTIVE   History of Current Condition: Jose Fox is a 49 y.o. year old Right hand dominant male who underwent mass excision of the Left wrist on 6/10/24.   Pt reports improvement in rom and continued compliance with home exercise program   Response to previous treatment:improving arom  Functional change: returned to working out with weights  Pain: 0/10 "pulling" "tight"    Location: left sx site, wrist,and small finger    OBJECTIVE   Objective Measures updated at progress report unless specified.    Observation/Appearance:   Visible decrease in swelling throughout digits and dorsal hand/wrist. Localized swelling remains at sx though it has decreased. Sx scar adherent.     Sensation: Patient denies numbness/tingling        Edema:  (Measured circumferential, in centimeters)  Hand/Wrist: Right  6/27/2024 Left  6/27/2024   Wrist Crease 18.4 cm 19 cm   DPC 22.5 cm 22.5 cm         ELBOW, WRIST RANGE OF MOTION:   Measured in degrees of active motion with goniometer    Right  6/27/2024 Left  6/27/2024 Left  7/9/2024   Elbow Extension/Flexion WNL WNL  "   Pronation/Supination 80/60 70/50    Wrist Extension/Flexion 60/45 35/15 47/45   Ulnar/Radial Deviation 25/25 15/15 20/28   Composite Wrist Flexion 20 10          Digit AROM:  Measured in degrees of active motion with goniometer and/or distance measured from finger tip to the distal palmar crease (DPC)    Left  6/27/2024 Left  7/9/2024       Digital arom wnl now   Index: MP  -17/47               PIP     -10/90               DIP 0/46               GARCIA 156           Long:  MP -20/60               PIP -20/92               DIP 0/52               GARCIA 164           Ring:   MP -23/63               PIP -14/90               DIP 0/60               GARCIA 176           Small:  MP -5/55                PIP -8/80                DIP 0/63               GARCIA 185           Thumb: MP                   IP          Rad ADD/ABD          Pal ADD/ABD          Opposition DPC            Treatment     Sylvester received the treatments listed below:   Supervised modalities after being cleared for contradictions: Fluidotherapy - x 10 min to wrist and hand to increase soft tissue extensibility     Sylvester performed therapeutic exercises for 40 minutes including:  -reassessment arom  - PROM LLPS: hook x 20+ reps  -AAROM Hook x 20 reps  - AROM waves, fist, straight fist 20 reps ea  - AROM  finger lifts over towel with scar mobilization 20 resp (Not Today   - AROM  wrist flex/ext (open and fisted), UD/RD 20 each  - AROM wrist f/e x 3 min   Wrist Pre's over wedge with 1 lb and with FA in 2 planes, 3/10 reps each plane FA in pro and FA in neutral  - Re-iterated  precautions of no heavy lifting/gripping    Manual Therapy: 10 min to address adhesions and edema  DFM   Scar mobilization with Tissue Movers  Lymphatic drainage.  Applied KT EDF strips on dorsal digits 2-5, and EDF across dorsal hand (Not Today)    Patient Education and Home Exercises      Education provided:   - Added compression gloves   - yellow sponge   - Progress towards goals     Written Home  Exercises Provided: Patient instructed to cont prior HEP.  Exercises were reviewed and Sylvester was able to demonstrate them prior to the end of the session.  Sylvester demonstrated good  understanding of the HEP provided. See EMR under Patient Instructions for exercises provided during therapy sessions.      ASSESSMENT      3 wks, 6 days s/p. Digital arom wnl. Wrist arom significantly improved all planes, though deficits remaining. Adherent scarring remains. Edema decreasing.   Sylvester is progressing  towards his goals and there are no updates to goals at this time. Pt prognosis is Good.     Pt will continue to benefit from skilled outpatient occupational therapy to address the deficits listed in the problem list on initial evaluation, provide pt/family education and to maximize pt's level of independence in the home and community environment.     Pt's spiritual, cultural and educational needs considered and pt agreeable to plan of care and goals.    Anticipated barriers to occupational therapy: Gout    Goals:  Long Term Goals (to be met by discharge):  Date Goal Met:       1.) Jose Fox will demonstrate significantly improved functional performance from re-assessment as measured by a FOTO Intake score of more than 67.     Goal Status:   In progress     2.) Jose Fox will return to near to prior level of function for ADLs and household management reporting independence or modified independence.     Goal Status:   In progress     3. Jose Fox will report pain 2 out of 10 at worst to increase functional use of affected hand for work and leisure tasks.     Goal Status:   In progress      Short Term Goals (to be met by 7/25/24):  Date Goal Met:       Jose Fox will be independent with home exercise program with written instructions.     Goal Status:   In progress     Jose Fox will demonstrate 220 degrees of GARCIA of all digits to improve functional performance in ADLs/work/leisure tasks.     Goal Status:   In  progress     Jose Fox will demonstrate 45/45 degrees of wrist ext/flex to improve functional performance in ADLs/work/leisure tasks.     Goal Status:   In progress     Jose Fox will demonstrate within 20 lbs of  strength compared to unaffected hand to improve functional grasp for ADLs/work/leisure tasks.     Goal Status:   In progress     Jose Fox will demonstrate the ability to complete ADL/IADL tasks with 4/10 pain.     Goal Status:   In progress     Jose Fox will be able to resume significantly greater occupational roles independently or modified as demonstrated by a FOTO Intake score of more than 57.     Goal Status:   In progress           PLAN     Continue skilled occupational therapy with individualized plan of care focusing on arom, and manual therapy     Updates/Grading for next session: progress as tolerated.    Kareen Lyman OT

## 2024-07-10 ENCOUNTER — OFFICE VISIT (OUTPATIENT)
Dept: INTERNAL MEDICINE | Facility: CLINIC | Age: 49
End: 2024-07-10
Payer: COMMERCIAL

## 2024-07-10 VITALS
BODY MASS INDEX: 32.58 KG/M2 | DIASTOLIC BLOOD PRESSURE: 80 MMHG | OXYGEN SATURATION: 99 % | HEART RATE: 84 BPM | TEMPERATURE: 97 F | HEIGHT: 73 IN | WEIGHT: 245.81 LBS | SYSTOLIC BLOOD PRESSURE: 120 MMHG | RESPIRATION RATE: 16 BRPM

## 2024-07-10 DIAGNOSIS — M1A.0710 CHRONIC IDIOPATHIC GOUT INVOLVING TOE OF RIGHT FOOT WITHOUT TOPHUS: ICD-10-CM

## 2024-07-10 DIAGNOSIS — M10.9 GOUT, UNSPECIFIED CAUSE, UNSPECIFIED CHRONICITY, UNSPECIFIED SITE: ICD-10-CM

## 2024-07-10 DIAGNOSIS — R21 RASH IN ADULT: Primary | ICD-10-CM

## 2024-07-10 PROCEDURE — 1159F MED LIST DOCD IN RCRD: CPT | Mod: CPTII,S$GLB,, | Performed by: INTERNAL MEDICINE

## 2024-07-10 PROCEDURE — 99213 OFFICE O/P EST LOW 20 MIN: CPT | Mod: S$GLB,,, | Performed by: INTERNAL MEDICINE

## 2024-07-10 PROCEDURE — 99999 PR PBB SHADOW E&M-EST. PATIENT-LVL IV: CPT | Mod: PBBFAC,,, | Performed by: INTERNAL MEDICINE

## 2024-07-10 PROCEDURE — 3079F DIAST BP 80-89 MM HG: CPT | Mod: CPTII,S$GLB,, | Performed by: INTERNAL MEDICINE

## 2024-07-10 PROCEDURE — 3074F SYST BP LT 130 MM HG: CPT | Mod: CPTII,S$GLB,, | Performed by: INTERNAL MEDICINE

## 2024-07-10 PROCEDURE — 3008F BODY MASS INDEX DOCD: CPT | Mod: CPTII,S$GLB,, | Performed by: INTERNAL MEDICINE

## 2024-07-10 RX ORDER — HYDROXYZINE HYDROCHLORIDE 25 MG/1
25 TABLET, FILM COATED ORAL 3 TIMES DAILY PRN
Qty: 21 TABLET | Refills: 1 | Status: SHIPPED | OUTPATIENT
Start: 2024-07-10

## 2024-07-10 RX ORDER — PREDNISONE 20 MG/1
TABLET ORAL
Qty: 8 TABLET | Refills: 0 | Status: SHIPPED | OUTPATIENT
Start: 2024-07-10

## 2024-07-10 RX ORDER — DOXYCYCLINE HYCLATE 100 MG
100 TABLET ORAL 2 TIMES DAILY
Qty: 14 TABLET | Refills: 0 | Status: SHIPPED | OUTPATIENT
Start: 2024-07-10

## 2024-07-10 NOTE — TELEPHONE ENCOUNTER
No care due was identified.  [facility] Embedded Care Due Messages. Reference number: [id number].   7/10/2024 10:34:46 AM CDT

## 2024-07-10 NOTE — PROGRESS NOTES
History of present illness:   49-year-old gentleman established patient of Dr. García.  He presents today with about a week's duration rash both forearms.  Started with left forearm which has developed on right as well.  Did have mass lesion wrist removed on June 10th appears to be a chronic synovitis.  The rash on the forearms does itch particularly at night.  No prior history of same.  No other skin issues elsewhere.  He has not engaged in any regular activities excessive heat.  Otherwise he has been doing well and reports no medications.    Current medications:   Medications are all noted and reviewed.      Past medical history, past surgical history family and social history is are all noted reviewed in our electronic medical record history sections.      Review of systems:   Constitutional: No fever no chills no generalized body aches.    HEENT: No URI symptoms.  No hoarseness no dysphagia.    Respiratory: No cough no shortness a breath.    Cardiovascular: No chest pain palpitations syncope.  GI: No nausea vomiting abdominal pain or diarrhea.    Physical examination:   General: Alert appropriately groomed gentleman no acute distress.    Vital signs: All noted and reviewed as normal.  Cardiovascular: Regular rate rhythm.  No significant murmur.    Skin:  Both forearms and upper arms is finally papular minimally erythematous eruption.  There are no vesicles no pustules.  He has scant hair on arms and thus it is difficult to tell if this is follicular based does appear somewhat so.  There is no edema.  No axillary adenopathy.    Impression:   Clinically does not relief pattern of heat type rash.  Could be a contact dermatitis, possible folliculitis.      Plan:   Prednisone 40 mg daily for four days.    Doxycycline 100 mg b.i.d. seven days.    Atarax 25 mg t.i.d. p.r.n. for itch.    Discussed topical soothing agents.    Advise if without resolution or worsening symptoms.

## 2024-07-11 ENCOUNTER — OFFICE VISIT (OUTPATIENT)
Dept: UROLOGY | Facility: CLINIC | Age: 49
End: 2024-07-11
Payer: COMMERCIAL

## 2024-07-11 VITALS
HEIGHT: 73 IN | SYSTOLIC BLOOD PRESSURE: 151 MMHG | WEIGHT: 249.31 LBS | BODY MASS INDEX: 33.04 KG/M2 | HEART RATE: 79 BPM | DIASTOLIC BLOOD PRESSURE: 95 MMHG

## 2024-07-11 DIAGNOSIS — E66.9 CLASS 1 OBESITY WITH SERIOUS COMORBIDITY AND BODY MASS INDEX (BMI) OF 32.0 TO 32.9 IN ADULT, UNSPECIFIED OBESITY TYPE: ICD-10-CM

## 2024-07-11 DIAGNOSIS — N52.9 ERECTILE DYSFUNCTION, UNSPECIFIED ERECTILE DYSFUNCTION TYPE: Primary | ICD-10-CM

## 2024-07-11 PROCEDURE — 3080F DIAST BP >= 90 MM HG: CPT | Mod: CPTII,S$GLB,, | Performed by: NURSE PRACTITIONER

## 2024-07-11 PROCEDURE — 1159F MED LIST DOCD IN RCRD: CPT | Mod: CPTII,S$GLB,, | Performed by: NURSE PRACTITIONER

## 2024-07-11 PROCEDURE — 3077F SYST BP >= 140 MM HG: CPT | Mod: CPTII,S$GLB,, | Performed by: NURSE PRACTITIONER

## 2024-07-11 PROCEDURE — 1160F RVW MEDS BY RX/DR IN RCRD: CPT | Mod: CPTII,S$GLB,, | Performed by: NURSE PRACTITIONER

## 2024-07-11 PROCEDURE — 99204 OFFICE O/P NEW MOD 45 MIN: CPT | Mod: S$GLB,,, | Performed by: NURSE PRACTITIONER

## 2024-07-11 PROCEDURE — 99999 PR PBB SHADOW E&M-EST. PATIENT-LVL IV: CPT | Mod: PBBFAC,,, | Performed by: NURSE PRACTITIONER

## 2024-07-11 PROCEDURE — 3008F BODY MASS INDEX DOCD: CPT | Mod: CPTII,S$GLB,, | Performed by: NURSE PRACTITIONER

## 2024-07-11 RX ORDER — TADALAFIL 20 MG/1
20 TABLET ORAL DAILY PRN
Qty: 10 TABLET | Refills: 12 | Status: SHIPPED | OUTPATIENT
Start: 2024-07-11 | End: 2025-07-11

## 2024-07-11 RX ORDER — INDOMETHACIN 50 MG/1
CAPSULE ORAL
Qty: 90 CAPSULE | Refills: 0 | Status: SHIPPED | OUTPATIENT
Start: 2024-07-11

## 2024-07-11 NOTE — PROGRESS NOTES
CHIEF COMPLAINT:    Jose Fox is a 49 y.o. male presents today for ED.     HISTORY OF PRESENTING ILLINESS:    Jose Fox is a 49 y.o. male with a history of HTN, Gout, & aortic leak. This is a new patient to for me. I personally reviewed their recent medical records as well as their outside medical, surgical, family, & social history.     He is here today to discuss ED > 1 year.   Testosterone is normal.   Able to get erections but not able to maintain. He does exercise; eat healthy, takes vitamins and supplements.  He has tried Sildenafil from [facility]. Initially worked then stopped.   Successfully uses JAYLIN.     He listens and follows advise from [employer]. Dr. Springer.         REVIEW OF SYSTEMS:  Review of Systems   Constitutional: Negative.  Negative for chills, fever and malaise/fatigue.   Eyes:  Negative for double vision.   Respiratory:  Negative for cough and shortness of breath.    Cardiovascular:  Negative for chest pain and palpitations.   Gastrointestinal:  Negative for abdominal pain, constipation, diarrhea, nausea and vomiting.   Genitourinary: Negative.  Negative for dysuria, frequency, hematuria and urgency.   EMEKA Score 14  2  3  3  3  3    Musculoskeletal:  Negative for falls.   Neurological:  Negative for dizziness and seizures.   Endo/Heme/Allergies:  Negative for polydipsia.         PATIENT HISTORY:    Past Medical History:   Diagnosis Date    Gout     Heart attack     leak in aorta    Hypertension     Hypothyroidism     Thyroid disease        Past Surgical History:   Procedure Laterality Date    CARDIAC ELECTROPHYSIOLOGY STUDY AND ABLATION  2010    EXCISION OF MASS OF HAND Left 6/10/2024    Procedure: LEFT DORSAL HAND EXCISION, MASS;  Surgeon: Flores Louis MD;  Location: [facility];  Service: Orthopedics;  [place]: [place] [place]     ROBOT-ASSISTED LAPAROSCOPIC REPAIR OF VENTRAL HERNIA N/A 05/11/2023    Procedure: ROBOTIC REPAIR, HERNIA, VENTRAL;   Surgeon: José Miguel Espino MD;  Location: [facility] OR;  Service: General;  Laterality: N/A;  make second case       Family History   Problem Relation Name Age of Onset    Hypertension Mother Concepcion Fox     Arthritis Mother Concepcion Fox     Hypertension Father zoltan Fox     Diabetes Father zoltan Fox     Heart disease Father zoltan Fox     Drug abuse Son Sorin        Social History     Socioeconomic History    Marital status:     Number of children: 3   Occupational History    Occupation:       Comment: [employer]   Tobacco Use    Smoking status: Never    Smokeless tobacco: Never   Substance and Sexual Activity    Alcohol use: Never    Drug use: Never    Sexual activity: Yes     Partners: Female     Birth control/protection: None   Social History Narrative    23.18    25      Social Determinants of Health     Financial Resource Strain: Patient Declined (7/10/2024)    Overall Financial Resource Strain (CARDIA)     Difficulty of Paying Living Expenses: Patient declined   Food Insecurity: Patient Declined (7/10/2024)    Hunger Vital Sign     Worried About Running Out of Food in the Last Year: Patient declined     Ran Out of Food in the Last Year: Patient declined   Transportation Needs: No Transportation Needs (11/9/2022)    PRAPARE - Transportation     Lack of Transportation (Medical): No     Lack of Transportation (Non-Medical): No   Physical Activity: Unknown (7/10/2024)    Exercise Vital Sign     Days of Exercise per Week: 4 days   Stress: Patient Declined (7/10/2024)    [employer] - Occupational Stress Questionnaire     Feeling of Stress : Patient declined   Housing Stability: Unknown (7/10/2024)    Housing Stability Vital Sign     Unable to Pay for Housing in the Last Year: Patient declined       Allergies:  Patient has no known allergies.    Medications:    Current Outpatient Medications:     amLODIPine (NORVASC) 10 MG tablet, Take 1 tablet (10 mg  total) by mouth once daily., Disp: 90 tablet, Rfl: 3    buPROPion (WELLBUTRIN XL) 300 MG 24 hr tablet, Take 1 tablet (300 mg total) by mouth once daily., Disp: 90 tablet, Rfl: 3    diclofenac sodium (VOLTAREN) 1 % Gel, Apply 2 g topically 4 (four) times daily., Disp: 100 g, Rfl: 2    doxycycline (VIBRA-TABS) 100 MG tablet, Take 1 tablet (100 mg total) by mouth 2 (two) times daily., Disp: 14 tablet, Rfl: 0    febuxostat (ULORIC) 40 mg Tab, Take 1 tablet (40 mg total) by mouth once daily., Disp: 90 tablet, Rfl: 3    hydrOXYzine HCL (ATARAX) 25 MG tablet, Take 1 tablet (25 mg total) by mouth 3 (three) times daily as needed for Itching., Disp: 21 tablet, Rfl: 1    indomethacin (INDOCIN) 50 MG capsule, TAKE 1 CAPSULE(50 MG) BY MOUTH EVERY MEAL AS NEEDED FOR FLARE UP OR GOUT, Disp: 90 capsule, Rfl: 0    losartan-hydrochlorothiazide 100-25 mg (HYZAAR) 100-25 mg per tablet, Take 1 tablet by mouth once daily., Disp: 90 tablet, Rfl: 3    predniSONE (DELTASONE) 20 MG tablet, 2 po qd x 4d, Disp: 8 tablet, Rfl: 0    semaglutide (OZEMPIC) 1 mg/dose (2 mg/1.5 mL) PnIj, Inject 1 mg into the skin every 7 days., Disp: 4 each, Rfl: 5    sumatriptan (IMITREX) 50 MG tablet, Take 1 tablet (50 mg total) by mouth every 4 (four) hours as needed for Migraine. Not to exceed 3 tabs per day, Disp: 12 tablet, Rfl: 1    tirzepatide (MOUNJARO) 2.5 mg/0.5 mL PnIj, Inject 2.5 mg into the skin every 7 days., Disp: 12 Pen, Rfl: 1    tadalafiL (CIALIS) 20 MG Tab, Take 1 tablet (20 mg total) by mouth daily as needed (take 30-60 minutes before on an empty stomach)., Disp: 10 tablet, Rfl: 12    PHYSICAL EXAMINATION:  Physical Exam  Vitals and nursing note reviewed.   Constitutional:       General: He is awake.      Appearance: Normal appearance.   HENT:      Head: Normocephalic.      Right Ear: External ear normal.      Left Ear: External ear normal.      Nose: Nose normal.   Cardiovascular:      Rate and Rhythm: Normal rate.   Pulmonary:      Effort:  Pulmonary effort is normal. No respiratory distress.   Abdominal:      Tenderness: There is no abdominal tenderness. There is no right CVA tenderness or left CVA tenderness.   Genitourinary:     Penis: Normal and uncircumcised. No phimosis, paraphimosis or hypospadias.       Testes: Normal. Cremasteric reflex is present.         Right: Mass, tenderness or swelling not present.         Left: Mass, tenderness or swelling not present.   Musculoskeletal:         General: Normal range of motion.      Cervical back: Normal range of motion.   Skin:     General: Skin is warm and dry.   Neurological:      General: No focal deficit present.      Mental Status: He is alert and oriented to person, place, and time.   Psychiatric:         Mood and Affect: Mood normal.         Behavior: Behavior is cooperative.           LABS:          Lab Results   Component Value Date    PSA 1.3 09/25/2023       Lab Results   Component Value Date    CREATININE 1.7 (H) 05/27/2024    EGFRNORACEVR 48.2 (A) 05/27/2024               IMPRESSION:    Encounter Diagnoses   Name Primary?    Erectile dysfunction, unspecified erectile dysfunction type Yes    Class 1 obesity with serious comorbidity and body mass index (BMI) of 32.0 to 32.9 in adult, unspecified obesity type          Assessment:       1. Erectile dysfunction, unspecified erectile dysfunction type    2. Class 1 obesity with serious comorbidity and body mass index (BMI) of 32.0 to 32.9 in adult, unspecified obesity type        Plan:         I spent 45 minutes with the patient of which more than half was spent in direct consultation with the patient in regards to our treatment and plan.  We addressed the office findings and recent labs; any possible need to go the ER today.   We discussed his ED and the contributory factors.  Follow up with PCP for underlying medical conditions causing ED.   We discussed the physiological as well as his psychological aspects that contribute to ED.  Reviewed the  1st, 2nd, & 3rd line therapies for ED.  The benefits, expectations risks, se of the different therapies.  Encouraged to take on an empty stomach 30-60 minutes prior to interaction.   He was informed that for the JAYLIN we have rep that comes to clinic for him discuss.  If interested in ICI therapy, medication comes from a compound pharmacy and the 1st dose has to be done under medical supervision.  This is done due to high risk for priapism.  Educational materials given.  Recommended lifestyle modifications with a proper, healthy diet, good hydration but during the day. Reducing bladder irritants.   Benefits of regular exercise and weight loss.   Rx for Tadalafil 20mg given. Can use with JAYLIN  RTC 3 months

## 2024-07-16 ENCOUNTER — CLINICAL SUPPORT (OUTPATIENT)
Dept: REHABILITATION | Facility: HOSPITAL | Age: 49
End: 2024-07-16
Payer: COMMERCIAL

## 2024-07-16 DIAGNOSIS — M25.642 DECREASED RANGE OF MOTION OF FINGER OF LEFT HAND: ICD-10-CM

## 2024-07-16 DIAGNOSIS — M25.632 DECREASED RANGE OF MOTION OF LEFT WRIST: ICD-10-CM

## 2024-07-16 DIAGNOSIS — M25.532 LEFT WRIST PAIN: Primary | ICD-10-CM

## 2024-07-16 PROCEDURE — 97022 WHIRLPOOL THERAPY: CPT

## 2024-07-16 PROCEDURE — 97140 MANUAL THERAPY 1/> REGIONS: CPT

## 2024-07-16 PROCEDURE — 97530 THERAPEUTIC ACTIVITIES: CPT

## 2024-07-16 PROCEDURE — 97110 THERAPEUTIC EXERCISES: CPT

## 2024-07-16 NOTE — PROGRESS NOTES
"  [facility]  Occupational Therapy Treatment Note    Date: 7/16/2024  Name: Jose Fox  Clinic Number: [id number]    Therapy Diagnosis:   Encounter Diagnoses   Name Primary?    Left wrist pain Yes    Decreased range of motion of left wrist     Decreased range of motion of finger of left hand      Physician: Violeta Pacheco NP    Referring Physician: Violeta Pacheco NP  Physician Orders: Eval and Treat  Note:   Left wrist ganglion cyst excision  Date of Return to MD: 7/1/24     Date of Surgery: 6/10/24  Left dorsal wrist ganglion cyst excision     Evaluation Date: 6/27/2024  Authorization Period: 6/24/24 - 6/24/25  Plan of Care Expiration: 9/19/24  Visit #/ Visits Authorized: 4 of 12  FOTO Completion: Initial eval (6/27/2024)  Precautions:  Standard    Time In: 8:00 am  Time Out: 8:54 am  Total Billable Time: 54 minutes      SUBJECTIVE     History of Current Condition: Jose Fox is a 49 y.o. year old Right hand dominant male who underwent mass excision of the Left wrist on 6/10/24.     Pt reports improvement in rom and continued compliance with home exercise program   Response to previous treatment:improving arom  Functional change: returned to working out with weights    Pain: 0/10 "pulling" "tight"  Location: left sx site, wrist,and small finger    OBJECTIVE   Objective Measures updated at progress report unless specified.    Observation/Appearance:   Visible decrease in swelling throughout digits and dorsal hand/wrist. Localized swelling remains at sx though it has decreased. Sx scar adherent.     Sensation: Patient denies numbness/tingling        Edema:  (Measured circumferential, in centimeters)  Hand/Wrist: Right  6/27/2024 Left  6/27/2024   Wrist Crease 18.4 cm 19 cm   DPC 22.5 cm 22.5 cm         ELBOW, WRIST RANGE OF MOTION:   Measured in degrees of active motion with goniometer    Right  6/27/2024 Left  6/27/2024 Left  7/9/2024   Elbow Extension/Flexion WNL WNL "    Pronation/Supination 80/60 70/50    Wrist Extension/Flexion 60/45 35/15 47/45   Ulnar/Radial Deviation 25/25 15/15 20/28   Composite Wrist Flexion 20 10          Digit AROM:  Measured in degrees of active motion with goniometer and/or distance measured from finger tip to the distal palmar crease (DPC)    Left  6/27/2024 Left  7/9/2024       Digital arom wnl now   Index: MP  -17/47               PIP     -10/90               DIP 0/46               GARCIA 156           Long:  MP -20/60               PIP -20/92               DIP 0/52               GARCIA 164           Ring:   MP -23/63               PIP -14/90               DIP 0/60               GARCIA 176           Small:  MP -5/55                PIP -8/80                DIP 0/63               GARCIA 185           Thumb: MP                   IP          Rad ADD/ABD          Pal ADD/ABD          Opposition DPC            Treatment     Sylvester received the treatments listed below:   Supervised modalities after being cleared for contradictions: Fluidotherapy - x 10 min to wrist and hand to increase soft tissue extensibility      Manual Therapy: 10 min to address adhesions and edema  IASTM about dorsal hand, wrist and forearm  Lymphatic drainage (not today)  Applied KT EDF strips on dorsal digits 2-5, and EDF across dorsal hand (Not Today)       Sylvester performed therapeutic exercises for 17 minutes including:  - PROM LLPS: hook x 20+ reps (NT)  - AAROM Hook x 20 reps  - AROM waves, fist, straight fist 20 reps ea  - Isolated EDC with pencil (20 reps)  - AROM EDC with scar mobilization 20 reps  - AROM wrist flex/ext (open and fisted), UD/RD 20 each      Sylvester participated in dynamic functional therapeutic activities to improve functional performance for 17 minutes, including:  - Wrist cage (2 min)  - ABCs with tan weighted ball (1 set)  - Wrist PRE's over wedge with 2 lb and with FA in 2 planes, 3/10 reps each plane FA in pro and FA in neutral  - Resistive gripper foam , black spring  2nd rung (2 sets)  - PROM composite wrist/digit flexion, prayer stretch (3 x 30 ea)      Patient Education and Home Exercises      Education provided:   - Progress towards goals     Written Home Exercises Provided: Patient instructed to cont prior HEP.  Exercises were reviewed and Sylvester was able to demonstrate them prior to the end of the session.  Sylvester demonstrated good  understanding of the HEP provided. See EMR under Patient Instructions for exercises provided during therapy sessions.      ASSESSMENT     Sylvester tolerated all treatment tasks well. Reports of tightness along extensor pathway, and adhesions noted about scar. Required review for scar mobilization using dycem as he had been mobilizing in the same direction. He shows good understanding. Will progress as tolerated.     Sylvester is progressing  towards his goals and there are no updates to goals at this time. Pt prognosis is Good.     Pt will continue to benefit from skilled outpatient occupational therapy to address the deficits listed in the problem list on initial evaluation, provide pt/family education and to maximize pt's level of independence in the home and community environment.     Pt's spiritual, cultural and educational needs considered and pt agreeable to plan of care and goals.    Anticipated barriers to occupational therapy: Gout    Goals:  Long Term Goals (to be met by discharge):  Date Goal Met:       1.) Jose Fox will demonstrate significantly improved functional performance from re-assessment as measured by a FOTO Intake score of more than 67.     Goal Status:   In progress     2.) Jose Fox will return to near to prior level of function for ADLs and household management reporting independence or modified independence.     Goal Status:   In progress     3. Jose Fox will report pain 2 out of 10 at worst to increase functional use of affected hand for work and leisure tasks.     Goal Status:   In progress      Short Term Goals (to  be met by 7/25/24):  Date Goal Met:       Jose Fox will be independent with home exercise program with written instructions.     Goal Status:   In progress     Jose Fox will demonstrate 220 degrees of GARCIA of all digits to improve functional performance in ADLs/work/leisure tasks.    7/16/24 Goal Status:  Met     Jose Fox will demonstrate 45/45 degrees of wrist ext/flex to improve functional performance in ADLs/work/leisure tasks.    7/16/24 Goal Status:  Met     Jose Fox will demonstrate within 20 lbs of  strength compared to unaffected hand to improve functional grasp for ADLs/work/leisure tasks.     Goal Status:   In progress     Jose Fox will demonstrate the ability to complete ADL/IADL tasks with 4/10 pain.    7/16/24 Goal Status:  Met     Jose Fox will be able to resume significantly greater occupational roles independently or modified as demonstrated by a FOTO Intake score of more than 57.     Goal Status:   In progress           PLAN     Continue skilled occupational therapy with individualized plan of care focusing on arom, and manual therapy     Updates/Grading for next session: progress as tolerated.    Hallie Ortiz, OT

## 2024-07-18 NOTE — PROGRESS NOTES
" [facility]  Occupational Therapy Treatment Note    Date: 7/19/2024  Name: Jose Fox  Clinic Number: [id number]    Therapy Diagnosis:   Encounter Diagnoses   Name Primary?    Left wrist pain Yes    Decreased range of motion of left wrist     Decreased range of motion of finger of left hand        Physician: Violeta Pacheco NP    Referring Physician: Violeta Pacheco NP  Physician Orders: Eval and Treat  Note:   Left wrist ganglion cyst excision  Date of Return to MD: 7/1/24     Date of Surgery: 6/10/24  Left dorsal wrist ganglion cyst excision     Evaluation Date: 6/27/2024  Authorization Period: 6/24/24 - 6/24/25  Plan of Care Expiration: 9/19/24  Visit #/ Visits Authorized: 4 of 12  FOTO Completion: Initial eval (6/27/2024)  Precautions:  Standard    Time In: 7:03 am  Time Out: 8:00... am  Total Billable Time: 55 minutes      SUBJECTIVE     History of Current Condition: Jose Fox is a 49 y.o. year old Right hand dominant male who underwent mass excision of the Left wrist on 6/10/24.     Pt reports improvement in rom and continued compliance with home exercise program   Response to previous treatment:improving arom  Functional change: returned to working out with weights    Pain: 0/10 "pulling" "tight"  Location: left sx site, wrist,and small finger    OBJECTIVE   Objective Measures updated at progress report unless specified.    Observation/Appearance:   Visible decrease in swelling throughout digits and dorsal hand/wrist. Localized swelling remains at sx though it has decreased. Sx scar adherent.     Sensation: Patient denies numbness/tingling        Edema:  (Measured circumferential, in centimeters)  Hand/Wrist: Right  6/27/2024 Left  6/27/2024   Wrist Crease 18.4 cm 19 cm   DPC 22.5 cm 22.5 cm         ELBOW, WRIST RANGE OF MOTION:   Measured in degrees of active motion with goniometer    Right  6/27/2024 Left  6/27/2024 Left  7/9/2024 Left  7/19/2024   Elbow " Extension/Flexion WNL WNL     Pronation/Supination 80/60 70/50     Wrist Extension/Flexion 60/45 35/15 47/45 50/45   Ulnar/Radial Deviation 25/25 15/15 20/28    Composite Wrist Flexion 20 10           Digit AROM:  Measured in degrees of active motion with goniometer and/or distance measured from finger tip to the distal palmar crease (DPC)    Left  6/27/2024 Left  7/9/2024 7/19/2024       Digital arom wnl now    Index: MP  -17/47  -13/fff              PIP     -10/90  -6              DIP 0/46  0              GARCIA 156             Long:  MP -20/60  -12/fff              PIP -20/92  0              DIP 0/52  0              GARCIA 164             Ring:   MP -23/63  0/fff              PIP -14/90  0              DIP 0/60  0              GARCIA 176             Small:  MP -5/55  0+/80               PIP -8/80  0/103               DIP 0/63  0/60              GARCIA 185             Thumb: MP                    IP           Rad ADD/ABD           Pal ADD/ABD           Opposition DPC             Treatment     Sylvester received the treatments listed below:   Supervised modalities after being cleared for contradictions: Fluidotherapy - x 10 min to wrist and hand to increase soft tissue extensibility  Direct Contact Modality: Ultrasound to sx scar and periphery x 8 minutes, constant, 1.0 w/cm2, Lg 3 mghz      Manual Therapy: 15 min to address adhesions  Deep Friction Massage  Soft Tissue Mobilization  IASTM about dorsal hand, wrist and forearm  Lymphatic drainage (not today)  Applied KT to promote fascial and scar glide   Sylvester performed therapeutic exercises for 22 minutes including:  -reassessment ROM post heat  - AROM Hook, Fists x 10 reps ea  - AROM EDC with scar mobilization 20 reps  - AROM wrist flex/ext (open and fisted), UD/RD 20 each  -- Wrist PRE's over wedge with 3 lb and with FA in 3 planes, 3/10 reps each plane -   - Isolated EDC with pencil (20 reps)    Sylvester participated in dynamic functional therapeutic activities to improve functional  performance for ( Not Today ) minutes, including:  - Wrist cage (2 min)  - ABCs with tan weighted ball (1 set)  Resistive gripper foam , black spring 2nd rung (2 sets)  - PROM composite wrist/digit flexion, prayer stretch (3 x 30 ea)      Patient Education and Home Exercises      Education provided:   - Progress towards goals     Written Home Exercises Provided: Patient instructed to cont prior HEP.  Exercises were reviewed and Sylvester was able to demonstrate them prior to the end of the session.  Sylvester demonstrated good  understanding of the HEP provided. See EMR under Patient Instructions for exercises provided during therapy sessions.      ASSESSMENT   Reassessment reveals no progress in wrist arom. Extensor lag of fingers improving.   Extrinsic extensors adhesions continue to be a limiting factor. Localized edema continues at sx site. Added ultrasound at 100% duty cycle to promote resolution of adhesions.     Sylvester is progressing  towards his goals and there are no updates to goals at this time. Pt prognosis is Good.     Pt will continue to benefit from skilled outpatient occupational therapy to address the deficits listed in the problem list on initial evaluation, provide pt/family education and to maximize pt's level of independence in the home and community environment.     Pt's spiritual, cultural and educational needs considered and pt agreeable to plan of care and goals.    Anticipated barriers to occupational therapy: Gout    Goals:  Long Term Goals (to be met by discharge):  Date Goal Met:       1.) Jose Fox will demonstrate significantly improved functional performance from re-assessment as measured by a FOTO Intake score of more than 67.     Goal Status:   In progress     2.) Jose Fox will return to near to prior level of function for ADLs and household management reporting independence or modified independence.     Goal Status:   In progress     3. Jose Fox will report pain 2 out of  10 at worst to increase functional use of affected hand for work and leisure tasks.     Goal Status:   In progress      Short Term Goals (to be met by 7/25/24):  Date Goal Met:       Jose Fox will be independent with home exercise program with written instructions.     Goal Status:   In progress     Jose Fox will demonstrate 220 degrees of GARCIA of all digits to improve functional performance in ADLs/work/leisure tasks.    7/16/24 Goal Status:  Met     Jose Fox will demonstrate 45/45 degrees of wrist ext/flex to improve functional performance in ADLs/work/leisure tasks.    7/16/24 Goal Status:  Met     Jose Fox will demonstrate within 20 lbs of  strength compared to unaffected hand to improve functional grasp for ADLs/work/leisure tasks.     Goal Status:   In progress     Jose Fox will demonstrate the ability to complete ADL/IADL tasks with 4/10 pain.    7/16/24 Goal Status:  Met     Jose Fox will be able to resume significantly greater occupational roles independently or modified as demonstrated by a FOTO Intake score of more than 57.     Goal Status:   In progress           PLAN     Continue skilled occupational therapy with individualized plan of care focusing on arom, and manual therapy     Updates/Grading for next session: progress as tolerated.    Kareen Lyman, OT

## 2024-07-19 ENCOUNTER — OFFICE VISIT (OUTPATIENT)
Dept: INTERNAL MEDICINE | Facility: CLINIC | Age: 49
End: 2024-07-19
Payer: COMMERCIAL

## 2024-07-19 ENCOUNTER — CLINICAL SUPPORT (OUTPATIENT)
Dept: REHABILITATION | Facility: HOSPITAL | Age: 49
End: 2024-07-19
Payer: COMMERCIAL

## 2024-07-19 VITALS
RESPIRATION RATE: 16 BRPM | WEIGHT: 252.44 LBS | SYSTOLIC BLOOD PRESSURE: 134 MMHG | TEMPERATURE: 98 F | BODY MASS INDEX: 33.46 KG/M2 | DIASTOLIC BLOOD PRESSURE: 80 MMHG | HEIGHT: 73 IN | OXYGEN SATURATION: 99 % | HEART RATE: 74 BPM

## 2024-07-19 DIAGNOSIS — M25.632 DECREASED RANGE OF MOTION OF LEFT WRIST: ICD-10-CM

## 2024-07-19 DIAGNOSIS — L30.9 DERMATITIS: Primary | ICD-10-CM

## 2024-07-19 DIAGNOSIS — M25.642 DECREASED RANGE OF MOTION OF FINGER OF LEFT HAND: ICD-10-CM

## 2024-07-19 DIAGNOSIS — M25.532 LEFT WRIST PAIN: Primary | ICD-10-CM

## 2024-07-19 DIAGNOSIS — I10 ESSENTIAL HYPERTENSION: ICD-10-CM

## 2024-07-19 PROCEDURE — 99213 OFFICE O/P EST LOW 20 MIN: CPT | Mod: S$GLB,,,

## 2024-07-19 PROCEDURE — 99999 PR PBB SHADOW E&M-EST. PATIENT-LVL IV: CPT | Mod: PBBFAC,,,

## 2024-07-19 PROCEDURE — 3079F DIAST BP 80-89 MM HG: CPT | Mod: CPTII,S$GLB,,

## 2024-07-19 PROCEDURE — 97140 MANUAL THERAPY 1/> REGIONS: CPT

## 2024-07-19 PROCEDURE — 1159F MED LIST DOCD IN RCRD: CPT | Mod: CPTII,S$GLB,,

## 2024-07-19 PROCEDURE — 97035 APP MDLTY 1+ULTRASOUND EA 15: CPT

## 2024-07-19 PROCEDURE — 97022 WHIRLPOOL THERAPY: CPT

## 2024-07-19 PROCEDURE — 1160F RVW MEDS BY RX/DR IN RCRD: CPT | Mod: CPTII,S$GLB,,

## 2024-07-19 PROCEDURE — 3075F SYST BP GE 130 - 139MM HG: CPT | Mod: CPTII,S$GLB,,

## 2024-07-19 PROCEDURE — 97110 THERAPEUTIC EXERCISES: CPT

## 2024-07-19 PROCEDURE — 3008F BODY MASS INDEX DOCD: CPT | Mod: CPTII,S$GLB,,

## 2024-07-19 RX ORDER — TRIAMCINOLONE ACETONIDE 1 MG/G
CREAM TOPICAL 2 TIMES DAILY
Qty: 80 G | Refills: 0 | Status: SHIPPED | OUTPATIENT
Start: 2024-07-19

## 2024-07-19 NOTE — PROGRESS NOTES
Subjective:       Patient ID: Jose Fox is a 49 y.o. male.    Chief Complaint: Rash (Left arm x 3-4 weeks)    48 yo male present for evaluation of rash to left forearm. The rash has been ongoing for around 4 weeks now. He was evaluated for same symptoms on 07/10. At that time he was prescribed prednisone taper, doxycycline, and atarax. He completed the prescribed regimen with little to no improvement in symptoms.     Today he endorses continued itching and warmth on his arm. The symptoms are particularly worse at night. Denies the use of new cleansing products, detergents, or hygiene products. Rene any known irritants. Denies dietary changes. Denies new medications. He noticed the rash appeared after starting physical therapy for his left wrist. They put his arm in a warming machine. The rash seems to be on the area of his arm that goes in that machine.      Review of Systems   Constitutional:  Negative for chills, diaphoresis, fatigue, fever and unexpected weight change.   Respiratory:  Negative for cough, chest tightness, shortness of breath and wheezing.    Cardiovascular:  Negative for chest pain, palpitations and leg swelling.   Gastrointestinal:  Negative for abdominal pain, blood in stool, constipation, diarrhea, nausea and vomiting.   Musculoskeletal:  Negative for arthralgias, joint swelling and myalgias.   Integumentary:  Positive for rash. Negative for wound.   Neurological:  Negative for dizziness, weakness, light-headedness and headaches.         Objective:      Physical Exam  Constitutional:       General: He is awake. He is not in acute distress.     Appearance: Normal appearance. He is well-developed and well-groomed. He is not ill-appearing, toxic-appearing or diaphoretic.   HENT:      Head: Normocephalic and atraumatic.      Nose: Nose normal.      Mouth/Throat:      Mouth: Mucous membranes are moist.      Pharynx: Oropharynx is clear. No oropharyngeal exudate.   Eyes:      General: No  scleral icterus.     Conjunctiva/sclera: Conjunctivae normal.      Pupils: Pupils are equal, round, and reactive to light.   Cardiovascular:      Rate and Rhythm: Normal rate and regular rhythm.      Pulses: Normal pulses.      Heart sounds: Normal heart sounds. No murmur heard.     No friction rub. No gallop.   Pulmonary:      Effort: Pulmonary effort is normal. No respiratory distress.      Breath sounds: Normal breath sounds. No wheezing or rales.   Musculoskeletal:      Left wrist: Decreased range of motion.      Comments: Kinesiology tape present to left wrist and forearm.    Skin:     Findings: Erythema and rash present. Rash is macular, papular and urticarial.      Comments: See media.    Neurological:      Mental Status: He is alert.      GCS: GCS eye subscore is 4. GCS verbal subscore is 5. GCS motor subscore is 6.   Psychiatric:         Behavior: Behavior is cooperative.         Assessment:       1. Dermatitis  - triamcinolone acetonide 0.1% (KENALOG) 0.1 % cream; Apply topically 2 (two) times daily.  Dispense: 80 g; Refill: 0    2. Essential hypertension      Plan:   Apply triamcinolone cream 0.1% to affected area b.i.d. Avoid any known irritants or allergens.  Avoid excessive washing of the skin, and keep skin well moisturized.  May use moisturizer such as Cetaphil, CeraVe, Vanicream, etc. that is hypoallergenic and free fragrances in dyes.  May continue to take Atarax 25 mg as needed at nighttime for itching.  Chronic, stable.  Continue taking amlodipine and Hyzaar daily.  Monitor.      Follow up as needed or with new or worsening symptoms.     Patrick Lazcano,MSN, APRN, FNP-C  [facility], Internal Medicine  8:53 AM

## 2024-07-23 ENCOUNTER — PATIENT MESSAGE (OUTPATIENT)
Dept: PRIMARY CARE CLINIC | Facility: CLINIC | Age: 49
End: 2024-07-23
Payer: COMMERCIAL

## 2024-07-23 RX ORDER — HYDROXYZINE HYDROCHLORIDE 25 MG/1
25 TABLET, FILM COATED ORAL 3 TIMES DAILY PRN
Qty: 21 TABLET | Refills: 1 | Status: SHIPPED | OUTPATIENT
Start: 2024-07-23

## 2024-07-23 NOTE — TELEPHONE ENCOUNTER
----- Message from Lola S Oliver sent at 7/23/2024 10:00 AM CDT -----  Contact: Mobile  [phone]  Requesting an RX refill or new RX.    Is this a refill or new RX: Refill    RX name and strength hydrOXYzine HCL (ATARAX) 25 MG tablet    Is this a 30 day or 90 day RX: 21    Pharmacy name and phone #   [id number] - [place] - [address] [facility] [address] [facility]  [address]  [facility] [address]  [place] [address]  Phone: [phone] Fax: [phone]      The doctors have asked that we provide their patients with the following 2 reminders -- prescription refills can take up to 72 hours, and a friendly reminder that in the future you can use your [facility] account to request refills:   Comment: Urgent!

## 2024-07-24 ENCOUNTER — OFFICE VISIT (OUTPATIENT)
Dept: PRIMARY CARE CLINIC | Facility: CLINIC | Age: 49
End: 2024-07-24
Payer: COMMERCIAL

## 2024-07-24 VITALS
WEIGHT: 248.25 LBS | DIASTOLIC BLOOD PRESSURE: 80 MMHG | SYSTOLIC BLOOD PRESSURE: 128 MMHG | OXYGEN SATURATION: 98 % | HEART RATE: 74 BPM | BODY MASS INDEX: 32.75 KG/M2

## 2024-07-24 DIAGNOSIS — L25.9 CONTACT DERMATITIS, UNSPECIFIED CONTACT DERMATITIS TYPE, UNSPECIFIED TRIGGER: Primary | ICD-10-CM

## 2024-07-24 PROCEDURE — 3008F BODY MASS INDEX DOCD: CPT | Mod: CPTII,S$GLB,, | Performed by: STUDENT IN AN ORGANIZED HEALTH CARE EDUCATION/TRAINING PROGRAM

## 2024-07-24 PROCEDURE — 3079F DIAST BP 80-89 MM HG: CPT | Mod: CPTII,S$GLB,, | Performed by: STUDENT IN AN ORGANIZED HEALTH CARE EDUCATION/TRAINING PROGRAM

## 2024-07-24 PROCEDURE — 1160F RVW MEDS BY RX/DR IN RCRD: CPT | Mod: CPTII,S$GLB,, | Performed by: STUDENT IN AN ORGANIZED HEALTH CARE EDUCATION/TRAINING PROGRAM

## 2024-07-24 PROCEDURE — 99214 OFFICE O/P EST MOD 30 MIN: CPT | Mod: S$GLB,,, | Performed by: STUDENT IN AN ORGANIZED HEALTH CARE EDUCATION/TRAINING PROGRAM

## 2024-07-24 PROCEDURE — 1159F MED LIST DOCD IN RCRD: CPT | Mod: CPTII,S$GLB,, | Performed by: STUDENT IN AN ORGANIZED HEALTH CARE EDUCATION/TRAINING PROGRAM

## 2024-07-24 PROCEDURE — 99999 PR PBB SHADOW E&M-EST. PATIENT-LVL IV: CPT | Mod: PBBFAC,,, | Performed by: STUDENT IN AN ORGANIZED HEALTH CARE EDUCATION/TRAINING PROGRAM

## 2024-07-24 PROCEDURE — 3074F SYST BP LT 130 MM HG: CPT | Mod: CPTII,S$GLB,, | Performed by: STUDENT IN AN ORGANIZED HEALTH CARE EDUCATION/TRAINING PROGRAM

## 2024-07-24 RX ORDER — DESONIDE 0.5 MG/G
CREAM TOPICAL 2 TIMES DAILY
Qty: 60 G | Refills: 1 | Status: SHIPPED | OUTPATIENT
Start: 2024-07-24

## 2024-07-24 NOTE — PATIENT INSTRUCTIONS
Cream twice a day    Morning:  - Zyrtec 10mg (over the counter) once  -Pepcid 10 or 20 mg okay) once    Evening:  Hydroxyzine before bedtime once

## 2024-07-25 ENCOUNTER — TELEPHONE (OUTPATIENT)
Dept: ORTHOPEDICS | Facility: CLINIC | Age: 49
End: 2024-07-25
Payer: COMMERCIAL

## 2024-08-09 DIAGNOSIS — M1A.0710 CHRONIC IDIOPATHIC GOUT INVOLVING TOE OF RIGHT FOOT WITHOUT TOPHUS: ICD-10-CM

## 2024-08-09 DIAGNOSIS — M10.9 GOUT, UNSPECIFIED CAUSE, UNSPECIFIED CHRONICITY, UNSPECIFIED SITE: ICD-10-CM

## 2024-08-09 NOTE — TELEPHONE ENCOUNTER
No care due was identified.  Health [facility] Embedded Care Due Messages. Reference number: [id number].   8/09/2024 1:15:35 PM CDT

## 2024-08-13 RX ORDER — INDOMETHACIN 50 MG/1
CAPSULE ORAL
Qty: 90 CAPSULE | Refills: 0 | Status: SHIPPED | OUTPATIENT
Start: 2024-08-13

## 2024-08-26 DIAGNOSIS — I10 ESSENTIAL HYPERTENSION: ICD-10-CM

## 2024-08-26 RX ORDER — LOSARTAN POTASSIUM AND HYDROCHLOROTHIAZIDE 25; 100 MG/1; MG/1
1 TABLET ORAL DAILY
Qty: 90 TABLET | Refills: 3 | Status: SHIPPED | OUTPATIENT
Start: 2024-08-26 | End: 2025-08-26

## 2024-08-26 NOTE — TELEPHONE ENCOUNTER
No care due was identified.  Health [facility] Embedded Care Due Messages. Reference number: [id number].   8/26/2024 11:30:25 AM CDT

## 2024-09-09 DIAGNOSIS — M10.9 GOUT, UNSPECIFIED CAUSE, UNSPECIFIED CHRONICITY, UNSPECIFIED SITE: ICD-10-CM

## 2024-09-09 DIAGNOSIS — M1A.0710 CHRONIC IDIOPATHIC GOUT INVOLVING TOE OF RIGHT FOOT WITHOUT TOPHUS: ICD-10-CM

## 2024-09-09 NOTE — TELEPHONE ENCOUNTER
No care due was identified.  Health [facility] Embedded Care Due Messages. Reference number: [id number].   9/09/2024 11:38:20 AM CDT

## 2024-09-10 RX ORDER — INDOMETHACIN 50 MG/1
CAPSULE ORAL
Qty: 90 CAPSULE | Refills: 0 | Status: SHIPPED | OUTPATIENT
Start: 2024-09-10

## 2024-09-19 NOTE — PROGRESS NOTES
Subjective:       Patient ID: Jose Fox is a 49 y.o. male.   Chief Complaint: Rash    Rah of b/l extremities which started this week.   + itching and occasional erythema  No recent soap/detergent   No fevers  No med changes    Review of Systems   Constitutional:  Negative for fatigue and fever.   HENT:  Negative for nasal congestion, rhinorrhea and sore throat.    Eyes:  Negative for pain.   Respiratory:  Negative for cough and shortness of breath.    Gastrointestinal:  Negative for diarrhea and vomiting.   Integumentary:  Positive for rash.              Objective:        Physical Exam  HENT:      Head: Normocephalic and atraumatic.      Nose: Nose normal.      Mouth/Throat:      Mouth: Mucous membranes are moist.      Pharynx: Oropharynx is clear.   Eyes:      Extraocular Movements: Extraocular movements intact.      Conjunctiva/sclera: Conjunctivae normal.      Pupils: Pupils are equal, round, and reactive to light.   Pulmonary:      Effort: Pulmonary effort is normal.   Musculoskeletal:         General: No swelling. Normal range of motion.      Cervical back: Normal range of motion.      Right lower leg: No edema.      Left lower leg: No edema.   Skin:     General: Skin is warm.      Findings: Rash present. No lesion.   Neurological:      General: No focal deficit present.      Mental Status: He is alert and oriented to person, place, and time.      Motor: No weakness.           Assessment:         Problem List Items Addressed This Visit    None  Visit Diagnoses       Contact dermatitis, unspecified contact dermatitis type, unspecified trigger    -  Primary    Relevant Medications    desonide (DESOWEN) 0.05 % cream              Plan:         1. Contact dermatitis, unspecified contact dermatitis type, unspecified trigger  -     desonide (DESOWEN) 0.05 % cream; Apply topically 2 (two) times daily.  Dispense: 60 g; Refill: 1      Advised OTC sensitive skin daily moisturizer (ex Vanicream or Cerave), ang  baths, using humidifier in dry/cold weather, wearing cotton fabrics, and using a mild soap or a non-soap cleanser when washing.      Mari García MD

## 2024-10-07 ENCOUNTER — LAB VISIT (OUTPATIENT)
Dept: LAB | Facility: HOSPITAL | Age: 49
End: 2024-10-07
Attending: INTERNAL MEDICINE
Payer: COMMERCIAL

## 2024-10-07 ENCOUNTER — OFFICE VISIT (OUTPATIENT)
Dept: RHEUMATOLOGY | Facility: CLINIC | Age: 49
End: 2024-10-07
Payer: COMMERCIAL

## 2024-10-07 VITALS
BODY MASS INDEX: 33.28 KG/M2 | HEART RATE: 71 BPM | WEIGHT: 251.13 LBS | SYSTOLIC BLOOD PRESSURE: 146 MMHG | HEIGHT: 73 IN | DIASTOLIC BLOOD PRESSURE: 94 MMHG

## 2024-10-07 DIAGNOSIS — M10.9 GOUT, ARTHRITIS: ICD-10-CM

## 2024-10-07 DIAGNOSIS — R20.2 PARESTHESIAS IN RIGHT HAND: ICD-10-CM

## 2024-10-07 DIAGNOSIS — M25.832 MASS OF JOINT OF LEFT WRIST: ICD-10-CM

## 2024-10-07 DIAGNOSIS — R94.4 DECREASED GFR: ICD-10-CM

## 2024-10-07 DIAGNOSIS — M10.9 GOUT, ARTHRITIS: Primary | ICD-10-CM

## 2024-10-07 DIAGNOSIS — Z98.890 POST-OPERATIVE STATE: ICD-10-CM

## 2024-10-07 LAB
ALBUMIN SERPL BCP-MCNC: 4 G/DL (ref 3.5–5.2)
ALP SERPL-CCNC: 58 U/L (ref 55–135)
ALT SERPL W/O P-5'-P-CCNC: 54 U/L (ref 10–44)
ANION GAP SERPL CALC-SCNC: 8 MMOL/L (ref 8–16)
AST SERPL-CCNC: 37 U/L (ref 10–40)
BASOPHILS # BLD AUTO: 0.05 K/UL (ref 0–0.2)
BASOPHILS NFR BLD: 0.7 % (ref 0–1.9)
BILIRUB SERPL-MCNC: 1.1 MG/DL (ref 0.1–1)
BUN SERPL-MCNC: 22 MG/DL (ref 6–20)
CALCIUM SERPL-MCNC: 9.8 MG/DL (ref 8.7–10.5)
CHLORIDE SERPL-SCNC: 107 MMOL/L (ref 95–110)
CO2 SERPL-SCNC: 26 MMOL/L (ref 23–29)
CREAT SERPL-MCNC: 1.6 MG/DL (ref 0.5–1.4)
CRP SERPL-MCNC: 5.1 MG/L (ref 0–8.2)
DIFFERENTIAL METHOD BLD: ABNORMAL
EOSINOPHIL # BLD AUTO: 0 K/UL (ref 0–0.5)
EOSINOPHIL NFR BLD: 0.1 % (ref 0–8)
ERYTHROCYTE [DISTWIDTH] IN BLOOD BY AUTOMATED COUNT: 13.1 % (ref 11.5–14.5)
ERYTHROCYTE [SEDIMENTATION RATE] IN BLOOD BY PHOTOMETRIC METHOD: 7 MM/HR (ref 0–23)
EST. GFR  (NO RACE VARIABLE): 51.8 ML/MIN/1.73 M^2
GLUCOSE SERPL-MCNC: 155 MG/DL (ref 70–110)
HCT VFR BLD AUTO: 48.9 % (ref 40–54)
HGB BLD-MCNC: 16.1 G/DL (ref 14–18)
IMM GRANULOCYTES # BLD AUTO: 0.07 K/UL (ref 0–0.04)
IMM GRANULOCYTES NFR BLD AUTO: 1 % (ref 0–0.5)
LYMPHOCYTES # BLD AUTO: 1.9 K/UL (ref 1–4.8)
LYMPHOCYTES NFR BLD: 28.9 % (ref 18–48)
MCH RBC QN AUTO: 29.9 PG (ref 27–31)
MCHC RBC AUTO-ENTMCNC: 32.9 G/DL (ref 32–36)
MCV RBC AUTO: 91 FL (ref 82–98)
MONOCYTES # BLD AUTO: 0.6 K/UL (ref 0.3–1)
MONOCYTES NFR BLD: 9.1 % (ref 4–15)
NEUTROPHILS # BLD AUTO: 4 K/UL (ref 1.8–7.7)
NEUTROPHILS NFR BLD: 60.2 % (ref 38–73)
NRBC BLD-RTO: 0 /100 WBC
PLATELET # BLD AUTO: 211 K/UL (ref 150–450)
PMV BLD AUTO: 9.8 FL (ref 9.2–12.9)
POTASSIUM SERPL-SCNC: 4.3 MMOL/L (ref 3.5–5.1)
PROT SERPL-MCNC: 7.6 G/DL (ref 6–8.4)
RBC # BLD AUTO: 5.39 M/UL (ref 4.6–6.2)
SODIUM SERPL-SCNC: 141 MMOL/L (ref 136–145)
URATE SERPL-MCNC: 12.7 MG/DL (ref 3.4–7)
WBC # BLD AUTO: 6.67 K/UL (ref 3.9–12.7)

## 2024-10-07 PROCEDURE — 86140 C-REACTIVE PROTEIN: CPT | Performed by: INTERNAL MEDICINE

## 2024-10-07 PROCEDURE — 99214 OFFICE O/P EST MOD 30 MIN: CPT | Mod: S$GLB,,, | Performed by: INTERNAL MEDICINE

## 2024-10-07 PROCEDURE — 80053 COMPREHEN METABOLIC PANEL: CPT | Performed by: INTERNAL MEDICINE

## 2024-10-07 PROCEDURE — 36415 COLL VENOUS BLD VENIPUNCTURE: CPT | Performed by: INTERNAL MEDICINE

## 2024-10-07 PROCEDURE — 3008F BODY MASS INDEX DOCD: CPT | Mod: CPTII,S$GLB,, | Performed by: INTERNAL MEDICINE

## 2024-10-07 PROCEDURE — 1160F RVW MEDS BY RX/DR IN RCRD: CPT | Mod: CPTII,S$GLB,, | Performed by: INTERNAL MEDICINE

## 2024-10-07 PROCEDURE — 84550 ASSAY OF BLOOD/URIC ACID: CPT | Performed by: INTERNAL MEDICINE

## 2024-10-07 PROCEDURE — 3077F SYST BP >= 140 MM HG: CPT | Mod: CPTII,S$GLB,, | Performed by: INTERNAL MEDICINE

## 2024-10-07 PROCEDURE — 3080F DIAST BP >= 90 MM HG: CPT | Mod: CPTII,S$GLB,, | Performed by: INTERNAL MEDICINE

## 2024-10-07 PROCEDURE — 1159F MED LIST DOCD IN RCRD: CPT | Mod: CPTII,S$GLB,, | Performed by: INTERNAL MEDICINE

## 2024-10-07 PROCEDURE — 99999 PR PBB SHADOW E&M-EST. PATIENT-LVL IV: CPT | Mod: PBBFAC,,, | Performed by: INTERNAL MEDICINE

## 2024-10-07 PROCEDURE — 85652 RBC SED RATE AUTOMATED: CPT | Performed by: INTERNAL MEDICINE

## 2024-10-07 PROCEDURE — 85025 COMPLETE CBC W/AUTO DIFF WBC: CPT | Performed by: INTERNAL MEDICINE

## 2024-10-07 ASSESSMENT — ROUTINE ASSESSMENT OF PATIENT INDEX DATA (RAPID3)
AM STIFFNESS SCORE: 0, NO
PAIN SCORE: 0.5
PATIENT GLOBAL ASSESSMENT SCORE: 1
FATIGUE SCORE: 1
TOTAL RAPID3 SCORE: 0.5
MDHAQ FUNCTION SCORE: 0
PSYCHOLOGICAL DISTRESS SCORE: 2.2

## 2024-10-07 NOTE — PROGRESS NOTES
Subjective:       Patient ID: Jose Fox is a 49 y.o. male.    Chief Complaint: Numbness right hand    HPI:  Jose Fox is a 49 y.o. male right hand dominant  with history HTN, depression and gout since 2012 sent by Dr. Mari García for numbness in right hand especially 1st-3rd fingers since beginning of 2024.  Wrapping wrist at gym helps sensation.   No pain.  No morning stiffness.    No left hand involvement except mass on back of hand.     Saw Dr. Louis for mass on dorsum of left hand that began 2020 and is now limiting motion despite not enlarging.     History of gout since 2012 initially involved foot but has had it in hands, elbows, knees and feet.   Takes feboxostat daily and indomethacin for attacks.   Last attack a year ago.    History of kidney in 2017.  Type unknown.     Family of mother with arthritis in hands type unknown.       INTERVAL HISTORY:    Still on febuxostat 40 mg daily for gout.  No gout attacks since last visit.    Left hand surgery for mass on dorsum on left hand 6/2024.     Never had MRI of right hand due to insurance charging $800.  Denies difficulty using hand.   He continues to feel numbness and tingling.    Orthopedics thought it could be carpal tunnel.   No pain today.   No morning stiffness.         Review of Systems   Constitutional: Negative.  Negative for fever and unexpected weight change.   HENT: Negative.  Negative for mouth sores and trouble swallowing.    Eyes: Negative.  Negative for redness.   Respiratory: Negative.  Negative for shortness of breath.    Cardiovascular: Negative.  Negative for chest pain.   Gastrointestinal: Negative.  Negative for constipation and diarrhea.   Endocrine: Negative.    Genitourinary: Negative.  Negative for genital sores.   Musculoskeletal: Negative.    Skin: Negative.  Negative for rash.   Allergic/Immunologic: Negative.    Neurological:  Positive for headaches.        Paresthesia right hand   Hematological:  "Negative.  Does not bruise/bleed easily.   Psychiatric/Behavioral:  Positive for sleep disturbance.         Depression           Objective:   BP (!) 146/94 (BP Location: Left arm, Patient Position: Sitting)   Pulse 71   Ht 6' 1" (1.854 m)   Wt 113.9 kg (251 lb 1.7 oz)   BMI 33.13 kg/m²      Physical Exam   Constitutional: He is oriented to person, place, and time. normal appearance.   HENT:   Head: Normocephalic and atraumatic.   Cardiovascular: Normal rate.   Pulmonary/Chest: Effort normal and breath sounds normal.   Abdominal: Soft. Bowel sounds are normal.   Musculoskeletal:      Comments: 28 joint count: 0 tender and 1 swollen (left wrist dorsum swelling mild with well healed scar)  No MTP pain bilateral   Neurological: He is alert and oriented to person, place, and time.   Skin: Skin is warm and dry.            LABS    Pathology report 6/12/2024:  SPECIMEN FROM LEFT EXTENSOR TENDON:   MODERATELY INTENSE CHRONIC SYNOVITIS   DEPOSITS CONSISTENT TYPICAL OF GOUTY URIC ACID DEPOSITS     Component      Latest Ref Rng 5/27/2024   WBC      3.90 - 12.70 K/uL 7.53    RBC      4.60 - 6.20 M/uL 5.50    Hemoglobin      14.0 - 18.0 g/dL 16.7    Hematocrit      40.0 - 54.0 % 49.4    MCV      82 - 98 fL 90    MCH      27.0 - 31.0 pg 30.4    MCHC      32.0 - 36.0 g/dL 33.8    RDW      11.5 - 14.5 % 12.6    Platelet Count      150 - 450 K/uL 228    MPV      9.2 - 12.9 fL 10.0    Immature Granulocytes      0.0 - 0.5 % 0.9 (H)    Gran # (ANC)      1.8 - 7.7 K/uL 4.4    Immature Grans (Abs)      0.00 - 0.04 K/uL 0.07 (H)    Lymph #      1.0 - 4.8 K/uL 2.4    Mono #      0.3 - 1.0 K/uL 0.6    Eos #      0.0 - 0.5 K/uL 0.0    Baso #      0.00 - 0.20 K/uL 0.08    nRBC      0 /100 WBC 0    Gran %      38.0 - 73.0 % 57.8    Lymph %      18.0 - 48.0 % 31.3    Mono %      4.0 - 15.0 % 8.4    Eos %      0.0 - 8.0 % 0.5    Basophil %      0.0 - 1.9 % 1.1    Differential Method Automated    Sodium      136 - 145 mmol/L 140    Potassium   "    3.5 - 5.1 mmol/L 4.3    Chloride      95 - 110 mmol/L 105    CO2      23 - 29 mmol/L 27    Glucose      70 - 110 mg/dL 126 (H)    BUN      6 - 20 mg/dL 24 (H)    Creatinine      0.5 - 1.4 mg/dL 1.7 (H)    Calcium      8.7 - 10.5 mg/dL 10.0    PROTEIN TOTAL      6.0 - 8.4 g/dL 7.6    Albumin      3.5 - 5.2 g/dL 4.1    BILIRUBIN TOTAL      0.1 - 1.0 mg/dL 1.0    ALP      55 - 135 U/L 57    AST      10 - 40 U/L 42 (H)    ALT      10 - 44 U/L 67 (H)    eGFR      >60 mL/min/1.73 m^2 48.2 !    Anion Gap      8 - 16 mmol/L 8    Uric Acid      3.4 - 7.0 mg/dL 9.6 (H)    Sed Rate      0 - 23 mm/Hr 7    CRP      0.0 - 8.2 mg/L 4.4       Legend:  (H) High  ! Abnormal    5/6/2024    FINDINGS:  Mild moderate erosive arthritis changes 2nd and 5th MCP joints.  Accessory ossicle distal ulnar styloid process.  Early DJD juxta-articular remote calcification laterally at 1st MCP.  No fracture, dislocation.     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  Assessment:       Right hand paresthesias. RA versus overuse as  CTS.  Positive Phalens and Tinels on right.   Right hand erosive changes 2nd and 5th MCP  Swelling on dorsum of left hand.  Concerning for tenosynovitis post-op pathology showed gout.   History of gout.  Multiple attacks, hyperuricemia and response to indomethacin.  Now on febuxostat 40 mg daily and indomethacin prn.  No gout attacks since last visit.  History of kidney stone type unknown  Father with history of gout and mother with arthritis type unknown   Plan:       Labs  Hold on MRI hands to rule out RA since patient cannot afford at this time and does not have pain or morning stiffness.    Continue febuxostat with primary and consider increase dose if still elevated    RTO 6 months/prn

## 2024-10-07 NOTE — PROGRESS NOTES
10/6/2024     5:37 PM   Rapid3 Question Responses and Scores   MDHAQ Score 0   Psychologic Score 2.2   Pain Score 0.5   When you awakened in the morning OVER THE LAST WEEK, did you feel stiff? No   Fatigue Score 1   Global Health Score 1   RAPID3 Score 0.5        Answers submitted by the patient for this visit:  Rheumatology Questionnaire (Submitted on 10/6/2024)  fever: No  eye redness: No  mouth sores: No  headaches: Yes  shortness of breath: No  chest pain: No  trouble swallowing: No  diarrhea: No  constipation: No  unexpected weight change: No  genital sore: No  During the last 3 days, have you had a skin rash?: No  Bruises or bleeds easily: No  cough: Yes

## 2024-10-08 ENCOUNTER — PATIENT MESSAGE (OUTPATIENT)
Dept: RHEUMATOLOGY | Facility: CLINIC | Age: 49
End: 2024-10-08
Payer: COMMERCIAL

## 2024-10-08 DIAGNOSIS — R94.4 DECREASED GFR: ICD-10-CM

## 2024-10-08 DIAGNOSIS — M10.9 GOUT, ARTHRITIS: Primary | ICD-10-CM

## 2024-10-08 RX ORDER — COLCHICINE 0.6 MG/1
0.6 TABLET ORAL DAILY
Qty: 135 TABLET | Refills: 1 | Status: SHIPPED | OUTPATIENT
Start: 2024-10-08 | End: 2025-10-08

## 2024-10-08 RX ORDER — FEBUXOSTAT 80 MG/1
80 TABLET, FILM COATED ORAL DAILY
Qty: 90 TABLET | Refills: 1 | Status: SHIPPED | OUTPATIENT
Start: 2024-10-08

## 2024-10-08 NOTE — TELEPHONE ENCOUNTER
Uric acid very elevated.  Patient with history of kidney stones type unknown.  GFR is still declined but improved from previous studies.  Will send the patient to Nephrology.  Will request a higher dose of febuxostat and give colchicine prophylaxis to prevent attacks.  Will instruct patient not to use indomethacin as this could be compromising his kidney function.

## 2024-10-16 DIAGNOSIS — M1A.0710 CHRONIC IDIOPATHIC GOUT INVOLVING TOE OF RIGHT FOOT WITHOUT TOPHUS: ICD-10-CM

## 2024-10-16 DIAGNOSIS — M10.9 GOUT, UNSPECIFIED CAUSE, UNSPECIFIED CHRONICITY, UNSPECIFIED SITE: ICD-10-CM

## 2024-10-16 RX ORDER — INDOMETHACIN 50 MG/1
CAPSULE ORAL
Qty: 90 CAPSULE | Refills: 0 | Status: SHIPPED | OUTPATIENT
Start: 2024-10-16

## 2024-10-16 NOTE — TELEPHONE ENCOUNTER
No care due was identified.  Health [facility] Embedded Care Due Messages. Reference number: [id number].   10/16/2024 11:45:53 AM CDT

## 2024-10-17 DIAGNOSIS — D63.1 ANEMIA IN CHRONIC KIDNEY DISEASE, UNSPECIFIED CKD STAGE: Primary | ICD-10-CM

## 2024-10-17 DIAGNOSIS — N18.9 ANEMIA IN CHRONIC KIDNEY DISEASE, UNSPECIFIED CKD STAGE: Primary | ICD-10-CM

## 2024-10-21 ENCOUNTER — LAB VISIT (OUTPATIENT)
Dept: LAB | Facility: HOSPITAL | Age: 49
End: 2024-10-21
Attending: INTERNAL MEDICINE
Payer: COMMERCIAL

## 2024-10-21 DIAGNOSIS — D63.1 ANEMIA IN CHRONIC KIDNEY DISEASE, UNSPECIFIED CKD STAGE: ICD-10-CM

## 2024-10-21 DIAGNOSIS — N18.9 ANEMIA IN CHRONIC KIDNEY DISEASE, UNSPECIFIED CKD STAGE: ICD-10-CM

## 2024-10-21 LAB
BILIRUB UR QL STRIP: NEGATIVE
CLARITY UR REFRACT.AUTO: CLEAR
COLOR UR AUTO: YELLOW
CREAT UR-MCNC: 233 MG/DL (ref 23–375)
GLUCOSE UR QL STRIP: ABNORMAL
HGB UR QL STRIP: NEGATIVE
KETONES UR QL STRIP: NEGATIVE
LEUKOCYTE ESTERASE UR QL STRIP: NEGATIVE
NITRITE UR QL STRIP: NEGATIVE
PH UR STRIP: 6 [PH] (ref 5–8)
PROT UR QL STRIP: NEGATIVE
PROT UR-MCNC: 14 MG/DL (ref 0–15)
PROT/CREAT UR: 0.06 MG/G{CREAT} (ref 0–0.2)
SP GR UR STRIP: 1.02 (ref 1–1.03)
URN SPEC COLLECT METH UR: ABNORMAL

## 2024-10-21 PROCEDURE — 84156 ASSAY OF PROTEIN URINE: CPT | Performed by: INTERNAL MEDICINE

## 2024-10-21 PROCEDURE — 81003 URINALYSIS AUTO W/O SCOPE: CPT | Performed by: INTERNAL MEDICINE

## 2024-10-22 ENCOUNTER — OFFICE VISIT (OUTPATIENT)
Dept: NEPHROLOGY | Facility: CLINIC | Age: 49
End: 2024-10-22
Payer: COMMERCIAL

## 2024-10-22 VITALS
OXYGEN SATURATION: 97 % | SYSTOLIC BLOOD PRESSURE: 145 MMHG | WEIGHT: 259.5 LBS | BODY MASS INDEX: 34.23 KG/M2 | DIASTOLIC BLOOD PRESSURE: 89 MMHG | HEART RATE: 75 BPM

## 2024-10-22 DIAGNOSIS — N18.30 STAGE 3 CHRONIC KIDNEY DISEASE, UNSPECIFIED WHETHER STAGE 3A OR 3B CKD: Primary | ICD-10-CM

## 2024-10-22 DIAGNOSIS — M10.00 IDIOPATHIC GOUT, UNSPECIFIED CHRONICITY, UNSPECIFIED SITE: ICD-10-CM

## 2024-10-22 DIAGNOSIS — I12.9 BENIGN HYPERTENSION WITH CHRONIC KIDNEY DISEASE, STAGE III: ICD-10-CM

## 2024-10-22 DIAGNOSIS — N18.30 BENIGN HYPERTENSION WITH CHRONIC KIDNEY DISEASE, STAGE III: ICD-10-CM

## 2024-10-22 PROCEDURE — 3077F SYST BP >= 140 MM HG: CPT | Mod: CPTII,S$GLB,, | Performed by: INTERNAL MEDICINE

## 2024-10-22 PROCEDURE — 3066F NEPHROPATHY DOC TX: CPT | Mod: CPTII,S$GLB,, | Performed by: INTERNAL MEDICINE

## 2024-10-22 PROCEDURE — 3079F DIAST BP 80-89 MM HG: CPT | Mod: CPTII,S$GLB,, | Performed by: INTERNAL MEDICINE

## 2024-10-22 PROCEDURE — 99205 OFFICE O/P NEW HI 60 MIN: CPT | Mod: S$GLB,,, | Performed by: INTERNAL MEDICINE

## 2024-10-22 PROCEDURE — 3008F BODY MASS INDEX DOCD: CPT | Mod: CPTII,S$GLB,, | Performed by: INTERNAL MEDICINE

## 2024-10-22 PROCEDURE — 1159F MED LIST DOCD IN RCRD: CPT | Mod: CPTII,S$GLB,, | Performed by: INTERNAL MEDICINE

## 2024-10-22 PROCEDURE — 99999 PR PBB SHADOW E&M-EST. PATIENT-LVL III: CPT | Mod: PBBFAC,,, | Performed by: INTERNAL MEDICINE

## 2024-10-22 RX ORDER — HYDROCHLOROTHIAZIDE 25 MG/1
25 TABLET ORAL DAILY
Qty: 90 TABLET | Refills: 3 | Status: SHIPPED | OUTPATIENT
Start: 2024-10-22 | End: 2025-10-22

## 2024-10-22 RX ORDER — AMLODIPINE BESYLATE 10 MG/1
10 TABLET ORAL DAILY
Qty: 90 TABLET | Refills: 3 | Status: SHIPPED | OUTPATIENT
Start: 2024-10-22 | End: 2025-10-17

## 2024-10-22 NOTE — PROGRESS NOTES
Nephrology Clinic Note   10/22/2024    Chief Complaint   Patient presents with    Chronic Kidney Disease      History of present illness:  Patient is a 49 y.o. male. Htn , gout   Takes indomethacin for gout attacks for over 10 years   Sometimes aleve for headaches   Cr has been raniging 1.1-1.5 from 2019 to 2023  In 2024 up to 1.6-1.7     Review of Systems   Constitutional: Negative.    HENT: Negative.     Eyes: Negative.    Respiratory: Negative.     Cardiovascular: Negative.    Gastrointestinal: Negative.    Genitourinary: Negative.    Musculoskeletal: Negative.    Skin: Negative.    Neurological: Negative.    Endo/Heme/Allergies: Negative.    Psychiatric/Behavioral: Negative.     All other systems reviewed and are negative.      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    Hypertension     Hypothyroidism     Thyroid disease       Past Surgical History:   Procedure Laterality Date    CARDIAC ELECTROPHYSIOLOGY STUDY AND ABLATION  2010    EXCISION OF MASS OF HAND Left 6/10/2024    Procedure: LEFT DORSAL HAND EXCISION, MASS;  Surgeon: Flores Louis MD;  Location: [facility] OR;  Service: Orthopedics;  Laterality: Left;  REGIONAL /MAC    LEG SURGERY Left     ROBOT-ASSISTED LAPAROSCOPIC REPAIR OF VENTRAL HERNIA N/A 05/11/2023    Procedure: ROBOTIC REPAIR, HERNIA, VENTRAL;  Surgeon: José Miguel Espino MD;  Location: [facility] OR;  Service: General;  Laterality: N/A;  make second case        Current Outpatient Medications:     colchicine (COLCRYS) 0.6 mg tablet, Take 1 tablet (0.6 mg total) by mouth once daily. If you develop a gout attack take 2 tabs at the onset of the attack and 1 tab an hour later for a maximum of 3 tabs in a 24 hour, Disp: 135 tablet, Rfl: 1    febuxostat (ULORIC) 80 mg Tab, Take 1 tablet (80 mg total) by mouth once daily., Disp: 90 tablet, Rfl: 1    sumatriptan (IMITREX) 50 MG tablet, Take 1 tablet (50 mg total) by mouth every 4 (four) hours as  needed for Migraine. Not to exceed 3 tabs per day, Disp: 12 tablet, Rfl: 1    amLODIPine (NORVASC) 10 MG tablet, Take 1 tablet (10 mg total) by mouth once daily., Disp: 90 tablet, Rfl: 3    buPROPion (WELLBUTRIN XL) 300 MG 24 hr tablet, Take 1 tablet (300 mg total) by mouth once daily. (Patient not taking: Reported on 10/22/2024), Disp: 90 tablet, Rfl: 3    hydroCHLOROthiazide (HYDRODIURIL) 25 MG tablet, Take 1 tablet (25 mg total) by mouth once daily., Disp: 90 tablet, Rfl: 3  Review of patient's allergies indicates:  No Known Allergies   Social History     Tobacco Use    Smoking status: Never    Smokeless tobacco: Never   Substance Use Topics    Alcohol use: Never      Family History   Problem Relation Name Age of Onset    Hypertension Mother Concepcion Fox     Arthritis Mother Concepcion Fox     Hypertension Father zoltan Fox     Diabetes Father zoltan Fox     Heart disease Father zoltan Fox     Drug abuse Son Sorin         Physical Exam :  Vitals:    10/22/24 1302   BP: (!) 145/89   Pulse: 75     Physical Exam  Vitals reviewed.   Constitutional:       Appearance: Normal appearance.   HENT:      Head: Normocephalic and atraumatic.      Mouth/Throat:      Mouth: Mucous membranes are moist.   Eyes:      Conjunctiva/sclera: Conjunctivae normal.      Pupils: Pupils are equal, round, and reactive to light.   Cardiovascular:      Rate and Rhythm: Normal rate and regular rhythm.      Pulses: Normal pulses.      Heart sounds: Normal heart sounds.   Pulmonary:      Effort: Pulmonary effort is normal.      Breath sounds: Normal breath sounds.   Abdominal:      General: Bowel sounds are normal.      Palpations: Abdomen is soft.   Musculoskeletal:         General: Normal range of motion.   Skin:     General: Skin is warm.      Capillary Refill: Capillary refill takes less than 2 seconds.   Neurological:      General: No focal deficit present.      Mental Status: He is alert and oriented to person, place, and time.    Psychiatric:         Mood and Affect: Mood normal.         Labs reviewed   Images Reviewed    Assessment:    CKD stage 3 secondary HTN and NSAIDS has been using indomethacin for around 10 years for gout attacks usually takes 4 tablets a month averaging. Has no hematuria and no proteinuria. Cr has been raniging 1.1-1.5 from 2019 to 2023  In 2024 up to 1.6-1.7     Gout managed by rheumatology     HTN with CKD stage3      Plan:     AVOID NSAIDS strongly advised   stop losartan for now keep on hctz 25 mg qday and resume amlodipine 10 mg qday since losartan will be discontinued   Plan  Check RFP in 3 weeks along with cystatin C  Get renal US  RTC in 4 months if labs in 3 weeks are stable    Spent 60 min on patient care more than 50 % of time spent on counseling patient about diagnosis/prognosis and plan of care

## 2024-10-28 ENCOUNTER — HOSPITAL ENCOUNTER (OUTPATIENT)
Dept: RADIOLOGY | Facility: HOSPITAL | Age: 49
Discharge: HOME | End: 2024-10-28
Attending: INTERNAL MEDICINE
Payer: COMMERCIAL

## 2024-10-28 DIAGNOSIS — N18.30 STAGE 3 CHRONIC KIDNEY DISEASE, UNSPECIFIED WHETHER STAGE 3A OR 3B CKD: ICD-10-CM

## 2024-10-28 PROCEDURE — 76770 US EXAM ABDO BACK WALL COMP: CPT | Mod: TC

## 2024-10-28 PROCEDURE — 76770 US EXAM ABDO BACK WALL COMP: CPT | Mod: 26,,, | Performed by: INTERNAL MEDICINE

## 2024-10-30 DIAGNOSIS — N22 CALCULUS OF URINARY TRACT IN DISEASES CLASSIFIED ELSEWHERE: Primary | ICD-10-CM

## 2024-10-31 ENCOUNTER — HOSPITAL ENCOUNTER (OUTPATIENT)
Dept: RADIOLOGY | Facility: HOSPITAL | Age: 49
Discharge: HOME | End: 2024-10-31
Attending: INTERNAL MEDICINE
Payer: COMMERCIAL

## 2024-10-31 DIAGNOSIS — N22 CALCULUS OF URINARY TRACT IN DISEASES CLASSIFIED ELSEWHERE: ICD-10-CM

## 2024-10-31 DIAGNOSIS — D17.5: ICD-10-CM

## 2024-10-31 DIAGNOSIS — R16.0 HEPATOMEGALY: Primary | ICD-10-CM

## 2024-10-31 PROCEDURE — 74176 CT ABD & PELVIS W/O CONTRAST: CPT | Mod: 26,,, | Performed by: RADIOLOGY

## 2024-10-31 PROCEDURE — 74176 CT ABD & PELVIS W/O CONTRAST: CPT | Mod: TC

## 2025-01-07 DIAGNOSIS — F32.A DEPRESSION, UNSPECIFIED DEPRESSION TYPE: ICD-10-CM

## 2025-01-07 DIAGNOSIS — M10.9 GOUT, ARTHRITIS: ICD-10-CM

## 2025-01-07 RX ORDER — BUPROPION HYDROCHLORIDE 300 MG/1
300 TABLET ORAL DAILY
Qty: 90 TABLET | Refills: 3 | Status: SHIPPED | OUTPATIENT
Start: 2025-01-07 | End: 2026-01-07

## 2025-01-07 NOTE — TELEPHONE ENCOUNTER
No care due was identified.  [facility] Embedded Care Due Messages. Reference number: [id number].   1/07/2025 9:15:29 AM CST

## 2025-01-08 RX ORDER — FEBUXOSTAT 80 MG/1
80 TABLET, FILM COATED ORAL DAILY
Qty: 90 TABLET | Refills: 0 | Status: SHIPPED | OUTPATIENT
Start: 2025-01-08

## 2025-02-26 ENCOUNTER — PROCEDURE VISIT (OUTPATIENT)
Dept: HEPATOLOGY | Facility: CLINIC | Age: 50
End: 2025-02-26
Payer: COMMERCIAL

## 2025-02-26 ENCOUNTER — LAB VISIT (OUTPATIENT)
Dept: LAB | Facility: HOSPITAL | Age: 50
End: 2025-02-26
Attending: INTERNAL MEDICINE
Payer: COMMERCIAL

## 2025-02-26 ENCOUNTER — OFFICE VISIT (OUTPATIENT)
Dept: HEPATOLOGY | Facility: CLINIC | Age: 50
End: 2025-02-26
Payer: COMMERCIAL

## 2025-02-26 VITALS
HEIGHT: 73 IN | SYSTOLIC BLOOD PRESSURE: 147 MMHG | WEIGHT: 250.88 LBS | OXYGEN SATURATION: 92 % | BODY MASS INDEX: 33.25 KG/M2 | RESPIRATION RATE: 18 BRPM | HEART RATE: 87 BPM | DIASTOLIC BLOOD PRESSURE: 91 MMHG

## 2025-02-26 DIAGNOSIS — R74.8 ELEVATED LIVER ENZYMES: ICD-10-CM

## 2025-02-26 DIAGNOSIS — D17.5: ICD-10-CM

## 2025-02-26 DIAGNOSIS — R16.0 HEPATOMEGALY: ICD-10-CM

## 2025-02-26 DIAGNOSIS — E66.811 OBESITY (BMI 30.0-34.9): ICD-10-CM

## 2025-02-26 DIAGNOSIS — K76.9 LIVER DISEASE, UNSPECIFIED: ICD-10-CM

## 2025-02-26 DIAGNOSIS — R93.2 ABNORMAL FINDING ON IMAGING OF LIVER: ICD-10-CM

## 2025-02-26 LAB
ALBUMIN SERPL BCP-MCNC: 4 G/DL (ref 3.5–5.2)
ALP SERPL-CCNC: 65 U/L (ref 40–150)
ALT SERPL W/O P-5'-P-CCNC: 62 U/L (ref 10–44)
AST SERPL-CCNC: 59 U/L (ref 10–40)
BILIRUB DIRECT SERPL-MCNC: 0.4 MG/DL (ref 0.1–0.3)
BILIRUB SERPL-MCNC: 1.4 MG/DL (ref 0.1–1)
FERRITIN SERPL-MCNC: 414 NG/ML (ref 20–300)
HAV IGG SER QL IA: NORMAL
HBV CORE AB SERPL QL IA: NORMAL
HBV SURFACE AB SER-ACNC: <3 MIU/ML
HBV SURFACE AB SER-ACNC: NORMAL M[IU]/ML
HBV SURFACE AG SERPL QL IA: NORMAL
HCV AB SERPL QL IA: NORMAL
HIV 1+2 AB+HIV1 P24 AG SERPL QL IA: NORMAL
INR PPP: 1 (ref 0.8–1.2)
IRON SERPL-MCNC: 92 UG/DL (ref 45–160)
PROT SERPL-MCNC: 8.1 G/DL (ref 6–8.4)
PROTHROMBIN TIME: 10.9 SEC (ref 9–12.5)
SATURATED IRON: 26 % (ref 20–50)
TOTAL IRON BINDING CAPACITY: 357 UG/DL (ref 250–450)
TRANSFERRIN SERPL-MCNC: 241 MG/DL (ref 200–375)

## 2025-02-26 PROCEDURE — 86038 ANTINUCLEAR ANTIBODIES: CPT | Performed by: INTERNAL MEDICINE

## 2025-02-26 PROCEDURE — 87389 HIV-1 AG W/HIV-1&-2 AB AG IA: CPT | Performed by: INTERNAL MEDICINE

## 2025-02-26 PROCEDURE — 1159F MED LIST DOCD IN RCRD: CPT | Mod: CPTII,S$GLB,, | Performed by: INTERNAL MEDICINE

## 2025-02-26 PROCEDURE — 3077F SYST BP >= 140 MM HG: CPT | Mod: CPTII,S$GLB,, | Performed by: INTERNAL MEDICINE

## 2025-02-26 PROCEDURE — 86803 HEPATITIS C AB TEST: CPT | Performed by: INTERNAL MEDICINE

## 2025-02-26 PROCEDURE — 87340 HEPATITIS B SURFACE AG IA: CPT | Performed by: INTERNAL MEDICINE

## 2025-02-26 PROCEDURE — 83540 ASSAY OF IRON: CPT | Performed by: INTERNAL MEDICINE

## 2025-02-26 PROCEDURE — 82728 ASSAY OF FERRITIN: CPT | Performed by: INTERNAL MEDICINE

## 2025-02-26 PROCEDURE — 86790 VIRUS ANTIBODY NOS: CPT | Performed by: INTERNAL MEDICINE

## 2025-02-26 PROCEDURE — 1160F RVW MEDS BY RX/DR IN RCRD: CPT | Mod: CPTII,S$GLB,, | Performed by: INTERNAL MEDICINE

## 2025-02-26 PROCEDURE — 80076 HEPATIC FUNCTION PANEL: CPT | Performed by: INTERNAL MEDICINE

## 2025-02-26 PROCEDURE — 3080F DIAST BP >= 90 MM HG: CPT | Mod: CPTII,S$GLB,, | Performed by: INTERNAL MEDICINE

## 2025-02-26 PROCEDURE — 3008F BODY MASS INDEX DOCD: CPT | Mod: CPTII,S$GLB,, | Performed by: INTERNAL MEDICINE

## 2025-02-26 PROCEDURE — 86704 HEP B CORE ANTIBODY TOTAL: CPT | Performed by: INTERNAL MEDICINE

## 2025-02-26 PROCEDURE — 80321 ALCOHOLS BIOMARKERS 1OR 2: CPT | Performed by: INTERNAL MEDICINE

## 2025-02-26 PROCEDURE — 82103 ALPHA-1-ANTITRYPSIN TOTAL: CPT | Performed by: INTERNAL MEDICINE

## 2025-02-26 PROCEDURE — 99999 PR PBB SHADOW E&M-EST. PATIENT-LVL IV: CPT | Mod: PBBFAC,,, | Performed by: INTERNAL MEDICINE

## 2025-02-26 PROCEDURE — 85610 PROTHROMBIN TIME: CPT | Performed by: INTERNAL MEDICINE

## 2025-02-26 PROCEDURE — 86706 HEP B SURFACE ANTIBODY: CPT | Performed by: INTERNAL MEDICINE

## 2025-02-26 PROCEDURE — 91200 LIVER ELASTOGRAPHY: CPT | Mod: S$GLB,,, | Performed by: INTERNAL MEDICINE

## 2025-02-26 PROCEDURE — 99204 OFFICE O/P NEW MOD 45 MIN: CPT | Mod: S$GLB,,, | Performed by: INTERNAL MEDICINE

## 2025-02-26 RX ORDER — LOSARTAN POTASSIUM AND HYDROCHLOROTHIAZIDE 25; 100 MG/1; MG/1
1 TABLET ORAL
COMMUNITY
Start: 2025-02-20

## 2025-02-26 NOTE — PROGRESS NOTES
Hepatology Consult Note    Referring provider: Dr. Miguel Angel Banda  PCP: Mari García MD    Chief complaint: abn LFTs, hepatomegaly, liver lesion    HPI:  Jose Fox is a 50 y.o. male who was referred to Hepatology Clinic for abn LFTs, hepatomegaly, liver lesion.     Regarding risk factors for liver disease, the patient denies prior history of EtOH abuse, illicit drug use, blood transfusions. 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 MASLD risk factors: obesity, HTN, LIZETTE, prediabetes.  Denies history of DM, HLD, LIZETTE. Denies prior diagnosis of liver disease. Maternal uncle had alcohol-related liver disease.     Currently taking MVI, VolcaNO - Explosive Nitric Oxide Booster, cranberry juice. Previously taking turmeric extract, mushroom tea and other herbal supplements.    The patient denies episodes of jaundice. The patient denies prior evaluation by hepatologist, liver biopsy.    Denies prior upper abdominal surgeries.    Originally from [place]. States his ethnic background is Chicano. I Moved to [facility] for work ~6 years ago.    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 OR;  Service: General;  Laterality: N/A;  make second case       Family History   Problem Relation Name Age of Onset    Hypertension Mother Concepcion Fox     Arthritis Mother Concepcion Fox     Hypertension Father zoltan Fox     Diabetes Father zoltan  "Ruddy     Heart disease Father zoltan Fox     Drug abuse Son Sorin        Social History[1]    Current Medications[2]    Review of patient's allergies indicates:  No Known Allergies         Vitals:    02/26/25 1456   BP: (!) 147/91   Pulse: 87   Resp: 18   SpO2: (!) 92%   Weight: 113.8 kg (250 lb 14.1 oz)   Height: 6' 1" (1.854 m)     Body mass index is 33.1 kg/m².    Physical Exam  Constitutional:       General: He is not in acute distress.  Eyes:      General: No scleral icterus.  Cardiovascular:      Rate and Rhythm: Normal rate.   Pulmonary:      Effort: Pulmonary effort is normal.   Abdominal:      General: Abdomen is protuberant. There is no distension.      Palpations: Abdomen is soft. There is no fluid wave, hepatomegaly or splenomegaly.      Tenderness: There is no abdominal tenderness.   Musculoskeletal:      Right lower leg: No edema.      Left lower leg: No edema.   Skin:     General: Skin is warm.      Coloration: Skin is not jaundiced.      Comments: No spider angiomas. No palmar erythema. No leukonychia.    Neurological:      General: No focal deficit present.      Mental Status: He is alert and oriented to person, place, and time.   Psychiatric:         Behavior: Behavior is cooperative.         Thought Content: Thought content normal.         LABS: I personally reviewed pertinent laboratory findings.    Lab Results   Component Value Date    WBC 6.67 10/07/2024    HGB 16.1 10/07/2024    HCT 48.9 10/07/2024    MCV 91 10/07/2024     10/07/2024       Lab Results   Component Value Date     10/07/2024    K 4.3 10/07/2024     10/07/2024    CO2 26 10/07/2024    BUN 22 (H) 10/07/2024    CREATININE 1.6 (H) 10/07/2024    CALCIUM 9.8 10/07/2024    ANIONGAP 8 10/07/2024    ESTGFRAFRICA >60.0 10/29/2021    EGFRNONAA >60.0 10/29/2021       Lab Results   Component Value Date    ALT 54 (H) 10/07/2024    AST 37 10/07/2024    ALKPHOS 58 10/07/2024    BILITOT 1.1 (H) 10/07/2024       Lab " "Results   Component Value Date    HEPCAB Non-reactive 09/26/2022       No results found for: "GARY", "MITOAB", "SMOOTHMUSCAB", "IGG", "CERULOPLSM"     Computed MELD 3.0 unavailable. One or more values for this score either were not found within the given timeframe or did not fit some other criterion.  Computed MELD-Na unavailable. One or more values for this score either were not found within the given timeframe or did not fit some other criterion.       IMAGING: I personally reviewed imaging studies.      Assessment:  Jose Fox is a 50 y.o. male who was referred to Hepatology Clinic for abn LFTs, hepatomegaly, liver lesion. Per chart review, the liver enzymes have been elevated in a hepatocellular pattern (ALT>AST, AST <1.5x ULN) since May 2024. Bilirubin and platelet count is normal. On CT renal stone study (10/31/24), there is mention of moderate hepatomegaly, as well as a lipoma along the liver capsule of the dome of the right lobe of the liver.     Etiology of liver enzyme elevation is unclear. Differential includes MASLD vs DILI from herbal supplements. Will complete serological workup for liver disease, non-invasive measures of fibrosis and dedicated imaging of the liver.    1. Elevated liver enzymes    2. Hepatomegaly    3. Lipoma of liver    4. Abnormal finding on imaging of liver    5. Liver disease, unspecified    6. Obesity (BMI 30.0-34.9)      Recommendations:  - LFTs, INR  - HAV IgG, HBsAg, HBsAb, HBc Total Ab, HCV ab, HIV  - GARY, A1AT phenotype  - Iron, ferritin, TIBC  - PETH  - FibroScan today  - CT abd w/wo contrast   - Counseled against herbal supplement use    Return to clinic in 3 months.    I have sent communication to the referring physician and/or primary care provider.    Virginia Gramajo MD  Staff Physician  Hepatology and Liver Transplant  [facility]  [facility] [place]           [1]   Social History  Tobacco Use    Smoking status: Never    " Smokeless tobacco: Never   Substance Use Topics    Alcohol use: Never    Drug use: Never   [2]   Current Outpatient Medications   Medication Sig Dispense Refill    buPROPion (WELLBUTRIN XL) 300 MG 24 hr tablet Take 1 tablet (300 mg total) by mouth once daily. 90 tablet 3    febuxostat (ULORIC) 80 mg Tab Take 1 tablet (80 mg total) by mouth once daily. 90 tablet 0    losartan-hydrochlorothiazide 100-25 mg (HYZAAR) 100-25 mg per tablet Take 1 tablet by mouth.      sumatriptan (IMITREX) 50 MG tablet Take 1 tablet (50 mg total) by mouth every 4 (four) hours as needed for Migraine. Not to exceed 3 tabs per day 12 tablet 1    amLODIPine (NORVASC) 10 MG tablet Take 1 tablet (10 mg total) by mouth once daily. 90 tablet 3    colchicine (COLCRYS) 0.6 mg tablet Take 1 tablet (0.6 mg total) by mouth once daily. If you develop a gout attack take 2 tabs at the onset of the attack and 1 tab an hour later for a maximum of 3 tabs in a 24 hour 135 tablet 1    hydroCHLOROthiazide (HYDRODIURIL) 25 MG tablet Take 1 tablet (25 mg total) by mouth once daily. 90 tablet 3     No current facility-administered medications for this visit.

## 2025-02-27 ENCOUNTER — RESULTS FOLLOW-UP (OUTPATIENT)
Dept: HEPATOLOGY | Facility: CLINIC | Age: 50
End: 2025-02-27

## 2025-02-27 LAB — ANA SER QL IF: NORMAL

## 2025-02-27 NOTE — PROCEDURES
FibroScan Transplant Hepatology    Date/Time: 2/26/2025 4:15 PM    Performed by: Peter Fuchs MA  Authorized by: Virginia Gramajo MD    Diagnosis:  NAFLD    Probe:  XL    Universal Protocol: Patient's identity, procedure and site were verified, confirmatory pause was performed.  Discussed procedure including risks and potential complications.  Questions answered.  Patient verbalizes understanding and wishes to proceed with VCTE.     Procedure: After providing explanations of the procedure, patient was placed in the supine position with right arm in maximum abduction to allow optimal exposure of right lateral abdomen.  Patient was briefly assessed, Testing was performed in the mid-axillary location, 50Hz Shear Wave pulses were applied and the resulting Shear Wave and Propagation Speed detected with a 3.5 MHz ultrasonic signal, using the FibroScan probe, Skin to liver capsule distance and liver parenchyma were accessed during the entire examination with the FibroScan probe, Patient was instructed to breathe normally and to abstain from sudden movements during the procedure, allowing for random measurements of liver stiffness. At least 10 Shear Waves were produced, Individual measurements of each Shear Wave were calculated.  Patient tolerated the procedure well with no complications.  Meets discharge criteria as was dismissed.  Rates pain 0 out of 10.  Patient will follow up with ordering provider to review results.    Findings  Median liver stiffness score:  5  CAP Reading: dB/m:  382    IQR/med %:  14  Interpretation  Fibrosis interpretation is based on medial liver stiffness - Kilopascal (kPa).    Fibrosis Stage:  F 0-1  Steatosis interpretation is based on controlled attenuation parameter - (dB/m).    Steatosis Grade:  S3

## 2025-02-28 LAB
CLINICAL BIOCHEMIST REVIEW: NORMAL
PLPETH BLD-MCNC: <10 NG/ML
POPETH BLD-MCNC: <10 NG/ML

## 2025-03-04 LAB
A1AT PHENOTYP SERPL-IMP: NORMAL BANDS
A1AT SERPL NEPH-MCNC: 167 MG/DL (ref 100–190)

## 2025-04-01 DIAGNOSIS — F32.A DEPRESSION, UNSPECIFIED DEPRESSION TYPE: ICD-10-CM

## 2025-04-01 RX ORDER — BUPROPION HYDROCHLORIDE 300 MG/1
300 TABLET ORAL DAILY
Qty: 90 TABLET | Refills: 3 | Status: SHIPPED | OUTPATIENT
Start: 2025-04-01 | End: 2026-04-01

## 2025-04-01 NOTE — TELEPHONE ENCOUNTER
No care due was identified.  [facility] Embedded Care Due Messages. Reference number: [id number].   4/01/2025 2:26:49 PM CDT

## 2025-04-10 ENCOUNTER — OFFICE VISIT (OUTPATIENT)
Dept: PRIMARY CARE CLINIC | Facility: CLINIC | Age: 50
End: 2025-04-10
Payer: COMMERCIAL

## 2025-04-10 VITALS
DIASTOLIC BLOOD PRESSURE: 86 MMHG | HEART RATE: 80 BPM | HEIGHT: 73 IN | WEIGHT: 255.31 LBS | SYSTOLIC BLOOD PRESSURE: 138 MMHG | BODY MASS INDEX: 33.84 KG/M2 | OXYGEN SATURATION: 98 %

## 2025-04-10 DIAGNOSIS — G56.01 RIGHT CARPAL TUNNEL SYNDROME: Primary | ICD-10-CM

## 2025-04-10 PROBLEM — M25.532 LEFT WRIST PAIN: Status: RESOLVED | Noted: 2024-06-27 | Resolved: 2025-04-10

## 2025-04-10 PROBLEM — M25.642 DECREASED RANGE OF MOTION OF FINGER OF LEFT HAND: Status: RESOLVED | Noted: 2024-06-27 | Resolved: 2025-04-10

## 2025-04-10 PROBLEM — R53.83 FATIGUE: Status: RESOLVED | Noted: 2022-01-31 | Resolved: 2025-04-10

## 2025-04-10 PROBLEM — M25.632 DECREASED RANGE OF MOTION OF LEFT WRIST: Status: RESOLVED | Noted: 2024-06-27 | Resolved: 2025-04-10

## 2025-04-10 PROBLEM — R22.32 MASS OF LEFT WRIST: Status: RESOLVED | Noted: 2024-06-10 | Resolved: 2025-04-10

## 2025-04-10 PROCEDURE — 99999 PR PBB SHADOW E&M-EST. PATIENT-LVL III: CPT | Mod: PBBFAC,,, | Performed by: INTERNAL MEDICINE

## 2025-04-10 PROCEDURE — 99214 OFFICE O/P EST MOD 30 MIN: CPT | Mod: S$GLB,,, | Performed by: INTERNAL MEDICINE

## 2025-04-10 PROCEDURE — 1159F MED LIST DOCD IN RCRD: CPT | Mod: CPTII,S$GLB,, | Performed by: INTERNAL MEDICINE

## 2025-04-10 PROCEDURE — 3079F DIAST BP 80-89 MM HG: CPT | Mod: CPTII,S$GLB,, | Performed by: INTERNAL MEDICINE

## 2025-04-10 PROCEDURE — 3008F BODY MASS INDEX DOCD: CPT | Mod: CPTII,S$GLB,, | Performed by: INTERNAL MEDICINE

## 2025-04-10 PROCEDURE — 3075F SYST BP GE 130 - 139MM HG: CPT | Mod: CPTII,S$GLB,, | Performed by: INTERNAL MEDICINE

## 2025-04-10 NOTE — PROGRESS NOTES
[facility] Note    Chief Complaint      Chief Complaint   Patient presents with    Numbness     C/o numbness in rt hand x3-4 months, getting worse. No pain, just numbness.      History of Present Illness      Jose Fox is a 50 y.o. male who presents today for wrist/hand numbness.  Patient comes to appointment alone.    Started with constant right hand numbness 3 months ago. Constant.  No issues with  strength.  Nonradiating.  Not worse at night.  Works as  at [employer].   Left wrist cyst removal last year with Dr. Jonh Gordillo.    Problem List Items Addressed This Visit    None  Visit Diagnoses         Right carpal tunnel syndrome    -  Primary            Health Maintenance   Topic Date Due    Pneumococcal Vaccines (Age 50+) (1 of 2 - PCV) Never done    Colorectal Cancer Screening  02/03/2023    Shingles Vaccine (1 of 2) Never done    Annual UACr  09/19/2023    Influenza Vaccine (1) 09/01/2024    COVID-19 Vaccine (5 - 2024-25 season) 09/01/2024    Hemoglobin A1c (Prediabetes)  09/25/2024    Lipid Panel  09/25/2024    TETANUS VACCINE  07/10/2034    RSV Vaccine (Age 60+ and Pregnant patients) (1 - 1-dose 75+ series) 02/25/2048    Hepatitis C Screening  Completed    HIV Screening  Completed       Past Medical History:   Diagnosis Date    Gout     Heart attack     leak in aorta    Hypertension     Hypothyroidism     Thyroid disease        Past Surgical History:   Procedure Laterality Date    CARDIAC ELECTROPHYSIOLOGY STUDY AND ABLATION  2010    EXCISION OF MASS OF HAND Left 6/10/2024    Procedure: LEFT DORSAL HAND EXCISION, MASS;  Surgeon: Flores Louis MD;  Location: [facility] OR;  Service: Orthopedics;  Laterality: Left;  [facility]AL [place]    LEG SURGERY Left     ROBOT-ASSISTED LAPAROSCOPIC REPAIR OF VENTRAL HERNIA N/A 05/11/2023    Procedure: ROBOTIC REPAIR, HERNIA, VENTRAL;  Surgeon: José Miguel Espino MD;  Location: [facility] OR;  Service: General;  Laterality: N/A;  make second case  "      family history includes Arthritis in his mother; Diabetes in his father; Drug abuse in his son; Heart disease in his father; Hypertension in his father and mother.    Social History[1]    Review of Systems   Constitutional:  Negative for chills and fever.   HENT:  Negative for hearing loss.    Eyes:  Negative for discharge.   Respiratory:  Negative for cough, shortness of breath and wheezing.    Cardiovascular:  Negative for chest pain and palpitations.   Gastrointestinal:  Negative for blood in stool, constipation, diarrhea, nausea and vomiting.   Genitourinary:  Negative for dysuria, hematuria and urgency.   Musculoskeletal:  Positive for neck pain. Negative for falls.   Neurological:  Negative for weakness and headaches.   Endo/Heme/Allergies:  Negative for polydipsia.        Encounter Medications[2]     Review of patient's allergies indicates:  No Known Allergies    Physical Exam      Vital Signs  Pulse: 80  SpO2: 98 %  BP: 138/86  Pain Score: 0-No pain  Height and Weight  Height: 6' 1" (185.4 cm)  Weight: 115.8 kg (255 lb 4.7 oz)  BSA (Calculated - sq m): 2.44 sq meters  BMI (Calculated): 33.7  Weight in (lb) to have BMI = 25: 189.1]    Physical Exam  Constitutional:       Appearance: He is well-developed.   HENT:      Head: Normocephalic and atraumatic.   Neck:      Thyroid: No thyromegaly.   Cardiovascular:      Rate and Rhythm: Normal rate and regular rhythm.      Heart sounds: No murmur heard.  Pulmonary:      Effort: Pulmonary effort is normal. No respiratory distress.      Breath sounds: Normal breath sounds.   Abdominal:      General: There is no distension.      Palpations: Abdomen is soft.      Tenderness: There is no abdominal tenderness.   Skin:     General: Skin is warm and dry.   Neurological:      Mental Status: He is alert and oriented to person, place, and time.   Psychiatric:         Behavior: Behavior normal.          Laboratory:  CBC:  No results for input(s): "WBC", "RBC", "HGB", " ""HCT", "PLT", "MCV", "MCH", "MCHC" in the last 2160 hours.  CMP:  Recent Labs   Lab Result Units 02/26/25  1630   Albumin g/dL 4.0   Total Protein g/dL 8.1   Alkaline Phosphatase U/L 65   ALT U/L 62*   AST U/L 59*   Total Bilirubin mg/dL 1.4*     URINALYSIS:  No results for input(s): "COLORU", "CLARITYU", "SPECGRAV", "PHUR", "PROTEINUA", "GLUCOSEU", "BILIRUBINCON", "BLOODU", "WBCU", "RBCU", "BACTERIA", "MUCUS", "NITRITE", "LEUKOCYTESUR", "UROBILINOGEN", "HYALINECASTS" in the last 2160 hours.   LIPIDS:  No results for input(s): "TSH", "HDL", "CHOL", "TRIG", "LDLCALC", "CHOLHDL", "NONHDLCHOL", "TOTALCHOLEST" in the last 2160 hours.  TSH:  No results for input(s): "TSH" in the last 2160 hours.  A1C:  No results for input(s): "HGBA1C" in the last 2160 hours.    Radiology:  No results found in the last 30 days.     Assessment/Plan     Jose Fox is a 50 y.o.male with:    1. Right carpal tunnel syndrome    -will try brace  -refer back to hand surg for eval for steroid injection vs surgery   -Continue current medications and maintain follow up with specialists.    -Follow up if symptoms worsen or fail to improve.       Basia Gandara MD  [facility]              Answers submitted by the patient for this visit:  Review of Systems Questionnaire (Submitted on 4/10/2025)  activity change: No  unexpected weight change: No  rhinorrhea: No  trouble swallowing: No  visual disturbance: No  chest tightness: No  polyuria: No  difficulty urinating: No  joint swelling: No  arthralgias: Yes  confusion: No  dysphoric mood: No         [1]   Social History  Tobacco Use    Smoking status: Never    Smokeless tobacco: Never   Substance Use Topics    Alcohol use: Never    Drug use: Never   [2]   Outpatient Encounter Medications as of 4/10/2025   Medication Sig Dispense Refill    buPROPion (WELLBUTRIN XL) 300 MG 24 hr tablet Take 1 tablet (300 mg total) by mouth once daily. 90 tablet 3    febuxostat (ULORIC) 80 mg Tab Take 1 " tablet (80 mg total) by mouth once daily. 90 tablet 0    losartan-hydrochlorothiazide 100-25 mg (HYZAAR) 100-25 mg per tablet Take 1 tablet by mouth.      sumatriptan (IMITREX) 50 MG tablet Take 1 tablet (50 mg total) by mouth every 4 (four) hours as needed for Migraine. Not to exceed 3 tabs per day 12 tablet 1    [DISCONTINUED] buPROPion (WELLBUTRIN XL) 300 MG 24 hr tablet Take 1 tablet (300 mg total) by mouth once daily. 90 tablet 3     No facility-administered encounter medications on file as of 4/10/2025.

## 2025-05-09 ENCOUNTER — PATIENT MESSAGE (OUTPATIENT)
Dept: ORTHOPEDICS | Facility: CLINIC | Age: 50
End: 2025-05-09
Payer: COMMERCIAL

## 2025-05-09 DIAGNOSIS — M79.641 RIGHT HAND PAIN: Primary | ICD-10-CM

## 2025-05-09 RX ORDER — LOSARTAN POTASSIUM AND HYDROCHLOROTHIAZIDE 25; 100 MG/1; MG/1
1 TABLET ORAL
Qty: 90 TABLET | Refills: 1 | Status: SHIPPED | OUTPATIENT
Start: 2025-05-09

## 2025-05-09 NOTE — TELEPHONE ENCOUNTER
Refill Routing Note   Medication(s) are not appropriate for processing by [facility] for the following reason(s):        No active prescription written by provider    [facility] action(s):  Defer        Medication Therapy Plan: Med was d/c by another provider but patient continued to fill. Recent fill reported 4/10/25      Appointments  past 12m or future 3m with PCP    Date Provider   Last Visit   7/24/2024 Mari García MD   Next Visit   Visit date not found Mari García MD   ED visits in past 90 days: 0        Note composed:1:21 PM 05/09/2025

## 2025-05-09 NOTE — TELEPHONE ENCOUNTER
No care due was identified.  [facility] Embedded Care Due Messages. Reference number: [id number].   5/09/2025 10:08:58 AM CDT

## 2025-05-17 ENCOUNTER — HOSPITAL ENCOUNTER (OUTPATIENT)
Dept: RADIOLOGY | Facility: HOSPITAL | Age: 50
Discharge: HOME | End: 2025-05-17
Attending: ORTHOPAEDIC SURGERY
Payer: COMMERCIAL

## 2025-05-17 DIAGNOSIS — M79.641 RIGHT HAND PAIN: ICD-10-CM

## 2025-05-17 PROCEDURE — 73130 X-RAY EXAM OF HAND: CPT | Mod: TC,RT

## 2025-05-17 PROCEDURE — 73130 X-RAY EXAM OF HAND: CPT | Mod: 26,RT,, | Performed by: RADIOLOGY

## 2025-05-22 ENCOUNTER — TELEPHONE (OUTPATIENT)
Dept: ORTHOPEDICS | Facility: CLINIC | Age: 50
End: 2025-05-22
Payer: COMMERCIAL

## 2025-05-27 ENCOUNTER — OFFICE VISIT (OUTPATIENT)
Dept: ORTHOPEDICS | Facility: CLINIC | Age: 50
End: 2025-05-27
Payer: COMMERCIAL

## 2025-05-27 VITALS — WEIGHT: 255.31 LBS | BODY MASS INDEX: 33.84 KG/M2 | HEIGHT: 73 IN

## 2025-05-27 DIAGNOSIS — G56.01 CARPAL TUNNEL SYNDROME OF RIGHT WRIST: Primary | ICD-10-CM

## 2025-05-27 PROCEDURE — 3008F BODY MASS INDEX DOCD: CPT | Mod: CPTII,S$GLB,, | Performed by: ORTHOPAEDIC SURGERY

## 2025-05-27 PROCEDURE — 99214 OFFICE O/P EST MOD 30 MIN: CPT | Mod: S$GLB,,, | Performed by: ORTHOPAEDIC SURGERY

## 2025-05-27 PROCEDURE — 99999 PR PBB SHADOW E&M-EST. PATIENT-LVL III: CPT | Mod: PBBFAC,,, | Performed by: ORTHOPAEDIC SURGERY

## 2025-05-27 NOTE — PROGRESS NOTES
"  Subjective:      Patient ID: Jose Fox is a 50 y.o. male.    Chief Complaint: Numbness and Tingling of the Right Hand      HPI  Jose Fox is a right hand dominant 50 y.o. male presenting today for right hand numbness. His numbness started about 3-4 months ago. He states that his right hand index, thumb and long finger a constantly numb. He has not tried injections or bracing. He has not had an EMG. Patient states he does not want surgery.     Review of patient's allergies indicates:  No Known Allergies      Current Medications[1]    Past Medical History:   Diagnosis Date    Gout     Heart attack     leak in aorta    Hypertension     Hypothyroidism     Thyroid disease        Past Surgical History:   Procedure Laterality Date    CARDIAC ELECTROPHYSIOLOGY STUDY AND ABLATION  2010    EXCISION OF MASS OF HAND Left 6/10/2024    Procedure: LEFT DORSAL HAND EXCISION, MASS;  Surgeon: Flores Louis MD;  Location: [facility];  Service: Orthopedics;  Laterality: Left;  REGIONAL /MAC    LEG SURGERY Left     ROBOT-ASSISTED LAPAROSCOPIC REPAIR OF VENTRAL HERNIA N/A 05/11/2023    Procedure: ROBOTIC REPAIR, HERNIA, VENTRAL;  Surgeon: José Miguel Espino MD;  Location: [facility];  Service: General;  Laterality: N/A;  make second case       Review of Systems:  Constitutional: Negative for chills and fever.   Respiratory: Negative for cough and shortness of breath.    Gastrointestinal: Negative for nausea and vomiting.   Skin: Negative for rash.   Neurological: Negative for dizziness and headaches.   Psychiatric/Behavioral: Negative for depression.   MSK as in HPI       OBJECTIVE:     PHYSICAL EXAM:  Ht 6' 1" (1.854 m)   Wt 115.8 kg (255 lb 4.7 oz)   BMI 33.68 kg/m²     GEN:  NAD, well-developed, well-groomed.  NEURO: Awake, alert, and oriented. Normal attention and concentration.    PSYCH: Normal mood and affect. Behavior is normal.  HEENT: No cervical lymphadenopathy noted.  CARDIOVASCULAR: Radial pulses 2+ " bilaterally. No LE edema noted.  PULMONARY: Breath sounds normal. No respiratory distress.  SKIN: Intact, no rashes.      MSK:     RUE:  Good active ROM of the wrist and fingers. AIN/PIN/Radial/Median/Ulnar Nerves assessed in isolation without deficit. Radial & Ulnar arteries palpated 2+. Capillary Refill <3s.  Decreased sensation in his long finger compared to left   Positive tinsels at the wrist    LUE:  Good active ROM of the wrist and fingers. AIN/PIN/Radial/Median/Ulnar Nerves assessed in isolation without deficit. Radial & Ulnar arteries palpated 2+. Capillary Refill <3s.      RADIOGRAPHS:    Comments: I have personally reviewed the imaging and I agree with the above radiologist's report.    ASSESSMENT/PLAN:   No diagnosis found.  50 year old male with right hand numbness.     No orders of the defined types were placed in this encounter.       Plan:   EMG and RTC after          The patient indicates understanding of these issues and agrees to the plan.    Carla Oviedo ATC, [facility]   [facility] [place]          [1]   Current Outpatient Medications   Medication Sig Dispense Refill    buPROPion (WELLBUTRIN XL) 300 MG 24 hr tablet Take 1 tablet (300 mg total) by mouth once daily. 90 tablet 3    febuxostat (ULORIC) 80 mg Tab Take 1 tablet (80 mg total) by mouth once daily. 90 tablet 0    losartan-hydrochlorothiazide 100-25 mg (HYZAAR) 100-25 mg per tablet TAKE 1 TABLET BY MOUTH DAILY 90 tablet 1    sumatriptan (IMITREX) 50 MG tablet Take 1 tablet (50 mg total) by mouth every 4 (four) hours as needed for Migraine. Not to exceed 3 tabs per day 12 tablet 1     No current facility-administered medications for this visit.

## 2025-06-03 ENCOUNTER — PROCEDURE VISIT (OUTPATIENT)
Dept: NEUROLOGY | Facility: CLINIC | Age: 50
End: 2025-06-03
Payer: COMMERCIAL

## 2025-06-03 DIAGNOSIS — G56.01 CARPAL TUNNEL SYNDROME OF RIGHT WRIST: ICD-10-CM

## 2025-06-03 PROCEDURE — 95886 MUSC TEST DONE W/N TEST COMP: CPT | Mod: S$GLB,,, | Performed by: PHYSICAL MEDICINE & REHABILITATION

## 2025-06-03 PROCEDURE — 95911 NRV CNDJ TEST 9-10 STUDIES: CPT | Mod: S$GLB,,, | Performed by: PHYSICAL MEDICINE & REHABILITATION

## 2025-06-04 ENCOUNTER — TELEPHONE (OUTPATIENT)
Dept: ORTHOPEDICS | Facility: CLINIC | Age: 50
End: 2025-06-04
Payer: COMMERCIAL

## 2025-06-10 ENCOUNTER — OFFICE VISIT (OUTPATIENT)
Dept: ORTHOPEDICS | Facility: CLINIC | Age: 50
End: 2025-06-10
Payer: COMMERCIAL

## 2025-06-10 VITALS — WEIGHT: 255.31 LBS | BODY MASS INDEX: 33.84 KG/M2 | HEIGHT: 73 IN

## 2025-06-10 DIAGNOSIS — G56.01 CARPAL TUNNEL SYNDROME OF RIGHT WRIST: ICD-10-CM

## 2025-06-10 DIAGNOSIS — M79.641 RIGHT HAND PAIN: Primary | ICD-10-CM

## 2025-06-10 DIAGNOSIS — G56.01 CARPAL TUNNEL SYNDROME OF RIGHT WRIST: Primary | ICD-10-CM

## 2025-06-10 PROCEDURE — 3008F BODY MASS INDEX DOCD: CPT | Mod: CPTII,S$GLB,, | Performed by: ORTHOPAEDIC SURGERY

## 2025-06-10 PROCEDURE — 99999 PR PBB SHADOW E&M-EST. PATIENT-LVL III: CPT | Mod: PBBFAC,,, | Performed by: ORTHOPAEDIC SURGERY

## 2025-06-10 PROCEDURE — 1159F MED LIST DOCD IN RCRD: CPT | Mod: CPTII,S$GLB,, | Performed by: ORTHOPAEDIC SURGERY

## 2025-06-10 PROCEDURE — 99214 OFFICE O/P EST MOD 30 MIN: CPT | Mod: S$GLB,,, | Performed by: ORTHOPAEDIC SURGERY

## 2025-06-10 NOTE — PROGRESS NOTES
"  Subjective:      Patient ID: Jose Fox is a 50 y.o. male.    Chief Complaint: Follow-up and Numbness of the Right Hand      HPI  Jose Fox is a right hand dominant 50 y.o. male presenting today for right hand numbness. His numbness started about 3-4 months ago. He states that his right hand index, thumb and long finger a constantly numb. He has not tried injections or bracing. He has not had an EMG. Patient states he does not want surgery.     Interval HPI 6/10/25 Patient is here today to review his EMG results, severe on the right. He works for [employer] and has to go out of town for work in July and would need to push the surgery back until after he gets back.     Review of patient's allergies indicates:  No Known Allergies      Current Medications[1]    Past Medical History:   Diagnosis Date    Gout     Heart attack     leak in aorta    Hypertension     Hypothyroidism     Thyroid disease        Past Surgical History:   Procedure Laterality Date    CARDIAC ELECTROPHYSIOLOGY STUDY AND ABLATION  2010    EXCISION OF MASS OF HAND Left 6/10/2024    Procedure: LEFT DORSAL HAND EXCISION, MASS;  Surgeon: Flores Louis MD;  Location: [facility] OR;  Service: Orthopedics;  Laterality: Left;  REGIONAL /MAC    LEG SURGERY Left     ROBOT-ASSISTED LAPAROSCOPIC REPAIR OF VENTRAL HERNIA N/A 05/11/2023    Procedure: ROBOTIC REPAIR, HERNIA, VENTRAL;  Surgeon: José Miguel Espino MD;  Location: [facility] OR;  Service: General;  Laterality: N/A;  make second case       Review of Systems:  Constitutional: Negative for chills and fever.   Respiratory: Negative for cough and shortness of breath.    Gastrointestinal: Negative for nausea and vomiting.   Skin: Negative for rash.   Neurological: Negative for dizziness and headaches.   Psychiatric/Behavioral: Negative for depression.   MSK as in HPI       OBJECTIVE:     PHYSICAL EXAM:  Ht 6' 1" (1.854 m)   Wt 115.8 kg (255 lb 4.7 oz)   BMI 33.68 kg/m²     GEN:  NAD, well-developed, " well-groomed.  NEURO: Awake, alert, and oriented. Normal attention and concentration.    PSYCH: Normal mood and affect. Behavior is normal.  HEENT: No cervical lymphadenopathy noted.  CARDIOVASCULAR: Radial pulses 2+ bilaterally. No LE edema noted.  PULMONARY: Breath sounds normal. No respiratory distress.  SKIN: Intact, no rashes.      MSK:     RUE:  Good active ROM of the wrist and fingers. AIN/PIN/Radial/Median/Ulnar Nerves assessed in isolation without deficit. Radial & Ulnar arteries palpated 2+. Capillary Refill <3s.  Decreased sensation in his long finger compared to left   Positive tinsels at the wrist    LUE:  Good active ROM of the wrist and fingers. AIN/PIN/Radial/Median/Ulnar Nerves assessed in isolation without deficit. Radial & Ulnar arteries palpated 2+. Capillary Refill <3s.      RADIOGRAPHS:    Comments: I have personally reviewed the imaging and I agree with the above radiologist's report.    ASSESSMENT/PLAN:   No diagnosis found.  50 year old male with right hand numbness.     No orders of the defined types were placed in this encounter.       Plan:   Consents were signed today for a right carpal tunnel repair  He will return to the clinic for a post op appointment with our PA          The patient indicates understanding of these issues and agrees to the plan.    Carla Oviedo ATC, [facility]  Hand Clinic   [facility] [place], [place]            [1]   Current Outpatient Medications   Medication Sig Dispense Refill    buPROPion (WELLBUTRIN XL) 300 MG 24 hr tablet Take 1 tablet (300 mg total) by mouth once daily. 90 tablet 3    febuxostat (ULORIC) 80 mg Tab Take 1 tablet (80 mg total) by mouth once daily. 90 tablet 0    losartan-hydrochlorothiazide 100-25 mg (HYZAAR) 100-25 mg per tablet TAKE 1 TABLET BY MOUTH DAILY 90 tablet 1    sumatriptan (IMITREX) 50 MG tablet Take 1 tablet (50 mg total) by mouth every 4 (four) hours as needed for Migraine. Not to exceed 3 tabs per day 12 tablet 1     No current  facility-administered medications for this visit.

## 2025-06-10 NOTE — PROGRESS NOTES
Patient ID: Jose Fox is a 50 y.o. male.    Chief Complaint: Follow-up and Numbness of the Right Hand    History of Present Illness    CHIEF COMPLAINT:  Right carpal tunnel syndrome    HPI:  Mr. Fox presents for follow-up of right carpal tunnel syndrome. His symptoms in the right hand are constant. Nerve test results show severe motor axon loss and active denervation in the right hand, with no response in sensory testing. He plans to travel to [place] for 2-3 weeks, which affects the timing of the proposed carpal tunnel release surgery.    He denies any formal medical diagnoses.    SURGICAL HISTORY:  Mr. Fox has a scheduled right carpal tunnel release surgery in July.    WORK STATUS:  Mr. Fox is potentially traveling to [place] for another property.    IMAGING:  Mr. Fox underwent an EMG and nerve conduction study of the right hand/wrist. The results revealed severe carpal tunnel syndrome with motor axon loss, active denervation, and no response in sensory testing.      ROS:  General: denies fever, denies chills, denies fatigue, denies weight gain, denies weight loss  Eyes: denies vision changes, denies redness, denies discharge  ENT: denies ear pain, denies nasal congestion, denies sore throat  Cardiovascular: denies chest pain, denies palpitations, denies lower extremity edema  Respiratory: denies cough, denies shortness of breath  Gastrointestinal: denies abdominal pain, denies nausea, denies vomiting, denies diarrhea, denies constipation, denies blood in stool  Genitourinary: denies dysuria, denies hematuria, denies frequency  Musculoskeletal: denies joint pain, denies muscle pain  Skin: denies rash, denies lesion  Neurological: denies headache, denies dizziness, reports numbness, denies tingling  Psychiatric: denies anxiety, denies depression, denies sleep difficulty         Physical Exam          See last exam    Assessment & Plan     Right carpal tunnel release: 10-minute outpatient  procedure under sedation with local anesthesia.   Post-op symptoms can include numbness in fingers for a few hours post-surgery.   Nerve regeneration may take 12-18 months to see final results.   Schedule surgery with Loren after the patient returns from [facility].        Patient has constant numbness did state that this may or may not return after the surgery based on the severity of the nerve test results went over the nerve test results in detail explained to him what the EMG findings as well as the no response findings on the nerve conduction study      No follow-ups on file.    This note was generated with the assistance of ambient listening technology. Verbal consent was obtained by the patient and accompanying visitor(s) for the recording of patient appointment to facilitate this note. I attest to having reviewed and edited the generated note for accuracy, though some syntax or spelling errors may persist. Please contact the author of this note for any clarification.

## 2025-06-11 NOTE — PROGRESS NOTES
Patient ID: Jose Fox is a 50 y.o. male.    Chief Complaint: Numbness and Tingling of the Right Hand    History of Present Illness    CHIEF COMPLAINT:  Right hand numbness and tingling in the thumb, index, and middle fingers.    HPI:  Mr. Fox presents with right hand numbness that started 3-4 months ago. He reports constant numbness in his index finger, middle finger, and thumb, with decreased sensation in the middle finger compared to the nails. He states that the numbness is getting worse. The condition developed gradually, with no specific incident causing the injury. He has problems flexing his hand and feels tightness when performing certain movements. The numbness does not significantly affect his daily activities, as he can still perform tasks without dropping items. He has learned to mentally block out the discomfort.    He has not tried injections or braces for this condition. He has a history of a previous procedure on the same hand for cysts around the tendons, which was performed to improve flexibility. At the time of the previous procedure, he had complaints of soreness, limited wrist extension, and numbness in the fingertips of the right hand. However, he reports that the procedure did not provide significant improvement.    He denies neck pain. He denies any history of gout.    PREVIOUS TREATMENTS:  Mr. Fox has attempted wearing a brace for approximately two hours, but this intervention did not yield significant benefits.    SURGICAL HISTORY:  Mr. Fox has undergone surgery on the right hand for cysts around the tendons. The date of this procedure was not specified.    WORK STATUS:  Mr. Fox was previously employed as a . He currently works in building, which involves gripping tools. The numbness in his hand does not impair his ability to work or cause him to drop objects.    IMAGING:  XR Right Wrist 3V reveals arthritis involving the index MCP joint, mild arthritis involving  the small finger MCP joint, and calcification in the TFCC.      ROS:  General: denies fever, denies chills, denies fatigue, denies weight gain, denies weight loss  Eyes: denies vision changes, denies redness, denies discharge  ENT: denies ear pain, denies nasal congestion, denies sore throat  Cardiovascular: denies chest pain, denies palpitations, denies lower extremity edema  Respiratory: denies cough, denies shortness of breath  Gastrointestinal: denies abdominal pain, denies nausea, denies vomiting, denies diarrhea, denies constipation, denies blood in stool  Genitourinary: denies dysuria, denies hematuria, denies frequency  Musculoskeletal: denies joint pain, denies muscle pain, reports joint stiffness  Skin: denies rash, denies lesion  Neurological: denies headache, denies dizziness, reports numbness, reports tingling, reports decreased sensation in extremities  Psychiatric: denies anxiety, denies depression, denies sleep difficulty         Physical Exam    Musculoskeletal: Cervical radiculopathy present. Pinched nerve present. Tingling in middle finger. Tingling in index finger. Small finger is not normal.         Assessment & Plan     Recommend nerve test to assess the state of the patient's nerves, particularly for carpal tunnel syndrome.   Mr. Fox agreed to undergo the nerve test.   Follow up after nerve conduction study.              No follow-ups on file.    This note was generated with the assistance of ambient listening technology. Verbal consent was obtained by the patient and accompanying visitor(s) for the recording of patient appointment to facilitate this note. I attest to having reviewed and edited the generated note for accuracy, though some syntax or spelling errors may persist. Please contact the author of this note for any clarification.

## 2025-06-13 ENCOUNTER — RESULTS FOLLOW-UP (OUTPATIENT)
Dept: PRIMARY CARE CLINIC | Facility: CLINIC | Age: 50
End: 2025-06-13

## 2025-06-13 ENCOUNTER — HOSPITAL ENCOUNTER (OUTPATIENT)
Dept: RADIOLOGY | Facility: HOSPITAL | Age: 50
Discharge: HOME | End: 2025-06-13
Attending: INTERNAL MEDICINE
Payer: COMMERCIAL

## 2025-06-13 ENCOUNTER — OFFICE VISIT (OUTPATIENT)
Dept: PRIMARY CARE CLINIC | Facility: CLINIC | Age: 50
End: 2025-06-13
Payer: COMMERCIAL

## 2025-06-13 VITALS
BODY MASS INDEX: 33.63 KG/M2 | HEIGHT: 73 IN | OXYGEN SATURATION: 97 % | SYSTOLIC BLOOD PRESSURE: 138 MMHG | DIASTOLIC BLOOD PRESSURE: 88 MMHG | WEIGHT: 253.75 LBS | HEART RATE: 82 BPM

## 2025-06-13 DIAGNOSIS — M79.672 BILATERAL FOOT PAIN: ICD-10-CM

## 2025-06-13 DIAGNOSIS — M79.671 BILATERAL FOOT PAIN: Primary | ICD-10-CM

## 2025-06-13 DIAGNOSIS — M79.672 BILATERAL FOOT PAIN: Primary | ICD-10-CM

## 2025-06-13 DIAGNOSIS — M79.671 BILATERAL FOOT PAIN: ICD-10-CM

## 2025-06-13 PROBLEM — M54.50 ACUTE LEFT-SIDED LOW BACK PAIN WITHOUT SCIATICA: Status: RESOLVED | Noted: 2020-03-25 | Resolved: 2025-06-13

## 2025-06-13 PROCEDURE — 73630 X-RAY EXAM OF FOOT: CPT | Mod: 26,50,, | Performed by: INTERNAL MEDICINE

## 2025-06-13 PROCEDURE — 73630 X-RAY EXAM OF FOOT: CPT | Mod: TC,50

## 2025-06-13 PROCEDURE — 99999 PR PBB SHADOW E&M-EST. PATIENT-LVL IV: CPT | Mod: PBBFAC,,, | Performed by: INTERNAL MEDICINE

## 2025-06-13 RX ORDER — DICLOFENAC SODIUM 75 MG/1
75 TABLET, DELAYED RELEASE ORAL 2 TIMES DAILY
Qty: 60 TABLET | Refills: 1 | Status: SHIPPED | OUTPATIENT
Start: 2025-06-13

## 2025-06-13 NOTE — PROGRESS NOTES
[facility] Note    Chief Complaint      Chief Complaint   Patient presents with    Foot Pain     History of Present Illness      Jose Fox is a 50 y.o. male who presents today for foot pain.  Patient comes to appointment alone.    Works as  at [employer].     Hx of plantar fasciitis, has orthotics.  Doing stretches. Interested in podiatry. Injections in ankles in past, thinks he may have had Achilles tendinitis.  Bought new shoes.    Problem List Items Addressed This Visit    None  Visit Diagnoses         Bilateral foot pain    -  Primary    Relevant Medications    diclofenac (VOLTAREN) 75 MG EC tablet    Other Relevant Orders    X-Ray Foot Complete Bilateral    Ambulatory referral/consult to Podiatry              Health Maintenance   Topic Date Due    Pneumococcal Vaccines (Age 50+) (1 of 2 - PCV) Never done    Colorectal Cancer Screening  02/03/2023    Shingles Vaccine (1 of 2) Never done    Annual UACr  09/19/2023    COVID-19 Vaccine (5 - 2024-25 season) 09/01/2024    Hemoglobin A1c (Prediabetes)  09/25/2024    Lipid Panel  09/25/2024    Influenza Vaccine (Season Ended) 09/01/2025    TETANUS VACCINE  07/10/2034    RSV Vaccine (Age 60+ and Pregnant patients) (1 - 1-dose 75+ series) 02/25/2048    Hepatitis C Screening  Completed    HIV Screening  Completed       Past Medical History:   Diagnosis Date    Gout     Heart attack     leak in aorta    Hypertension     Hypothyroidism     Thyroid disease        Past Surgical History:   Procedure Laterality Date    CARDIAC ELECTROPHYSIOLOGY STUDY AND ABLATION  2010    EXCISION OF MASS OF HAND Left 6/10/2024    Procedure: LEFT DORSAL HAND EXCISION, MASS;  Surgeon: Flores Louis MD;  Location: [facility];  Service: Orthopedics;  Laterality: Left;  REGIONAL /MAC    LEG SURGERY Left     ROBOT-ASSISTED LAPAROSCOPIC REPAIR OF VENTRAL HERNIA N/A 05/11/2023    Procedure: ROBOTIC REPAIR, HERNIA, VENTRAL;  Surgeon: José Miguel Espino MD;   "Location: [facility] OR;  Service: General;  Laterality: N/A;  make second case       family history includes Arthritis in his mother; Diabetes in his father; Drug abuse in his son; Heart disease in his father; Hypertension in his father and mother.    Social History[1]    Review of Systems   Constitutional:  Negative for chills and fever.   HENT:  Negative for hearing loss.    Eyes:  Negative for discharge.   Respiratory:  Negative for cough, shortness of breath and wheezing.    Cardiovascular:  Negative for chest pain and palpitations.   Gastrointestinal:  Negative for blood in stool, constipation, diarrhea, nausea and vomiting.   Genitourinary:  Negative for dysuria, hematuria and urgency.   Musculoskeletal:  Negative for falls.        Foot pain   Neurological:  Negative for weakness and headaches.   Endo/Heme/Allergies:  Negative for polydipsia.        Encounter Medications[2]     Review of patient's allergies indicates:  No Known Allergies    Physical Exam      Vital Signs  Pulse: 82  SpO2: 97 %  BP: 138/88  Pain Score:   7  Pain Loc: Foot  Height and Weight  Height: 6' 1" (185.4 cm)  Weight: 115.1 kg (253 lb 12 oz)  BSA (Calculated - sq m): 2.43 sq meters  BMI (Calculated): 33.5  Weight in (lb) to have BMI = 25: 189.1]    Physical Exam  Constitutional:       Appearance: He is well-developed.   HENT:      Head: Normocephalic and atraumatic.   Neck:      Thyroid: No thyromegaly.   Cardiovascular:      Rate and Rhythm: Normal rate and regular rhythm.      Heart sounds: No murmur heard.  Pulmonary:      Effort: Pulmonary effort is normal. No respiratory distress.      Breath sounds: Normal breath sounds.   Abdominal:      General: There is no distension.      Palpations: Abdomen is soft.      Tenderness: There is no abdominal tenderness.   Skin:     General: Skin is warm and dry.   Neurological:      Mental Status: He is alert and oriented to person, place, and time.   Psychiatric:         Behavior: Behavior normal. " "         Laboratory:  CBC:  No results for input(s): "WBC", "RBC", "HGB", "HCT", "PLT", "MCV", "MCH", "MCHC" in the last 2160 hours.  CMP:  No results for input(s): "GLU", "CALCIUM", "ALBUMIN", "PROT", "NA", "K", "CO2", "CL", "BUN", "ALKPHOS", "ALT", "AST", "BILITOT" in the last 2160 hours.    Invalid input(s): "CREATININ"    URINALYSIS:  No results for input(s): "COLORU", "CLARITYU", "SPECGRAV", "PHUR", "PROTEINUA", "GLUCOSEU", "BILIRUBINCON", "BLOODU", "WBCU", "RBCU", "BACTERIA", "MUCUS", "NITRITE", "LEUKOCYTESUR", "UROBILINOGEN", "HYALINECASTS" in the last 2160 hours.   LIPIDS:  No results for input(s): "TSH", "HDL", "CHOL", "TRIG", "LDLCALC", "CHOLHDL", "NONHDLCHOL", "TOTALCHOLEST" in the last 2160 hours.  TSH:  No results for input(s): "TSH" in the last 2160 hours.  A1C:  No results for input(s): "HGBA1C" in the last 2160 hours.    Radiology:  No results found in the last 30 days.     Assessment/Plan     Jose Fox is a 50 y.o.male with:    1. Bilateral foot pain  - X-Ray Foot Complete Bilateral; Future  - diclofenac (VOLTAREN) 75 MG EC tablet; Take 1 tablet (75 mg total) by mouth 2 (two) times daily.  Dispense: 60 tablet; Refill: 1  - Ambulatory referral/consult to Podiatry; Future      -Continue current medications and maintain follow up with specialists.    -Follow up if symptoms worsen or fail to improve.       Basia Gandara MD  [facility]             [1]   Social History  Tobacco Use    Smoking status: Never    Smokeless tobacco: Never   Substance Use Topics    Alcohol use: Never    Drug use: Never   [2]   Outpatient Encounter Medications as of 6/13/2025   Medication Sig Dispense Refill    buPROPion (WELLBUTRIN XL) 300 MG 24 hr tablet Take 1 tablet (300 mg total) by mouth once daily. 90 tablet 3    febuxostat (ULORIC) 80 mg Tab Take 1 tablet (80 mg total) by mouth once daily. 90 tablet 0    losartan-hydrochlorothiazide 100-25 mg (HYZAAR) 100-25 mg per tablet TAKE 1 TABLET BY MOUTH DAILY 90 " tablet 1    sumatriptan (IMITREX) 50 MG tablet Take 1 tablet (50 mg total) by mouth every 4 (four) hours as needed for Migraine. Not to exceed 3 tabs per day 12 tablet 1    diclofenac (VOLTAREN) 75 MG EC tablet Take 1 tablet (75 mg total) by mouth 2 (two) times daily. 60 tablet 1     No facility-administered encounter medications on file as of 6/13/2025.

## 2025-07-23 ENCOUNTER — PATIENT MESSAGE (OUTPATIENT)
Dept: PREADMISSION TESTING | Facility: HOSPITAL | Age: 50
End: 2025-07-23
Payer: COMMERCIAL

## 2025-07-23 NOTE — ANESTHESIA PAT ROS NOTE
07/23/2025  Jose Fox is a 50 y.o., male.      Pre-op Assessment    I have reviewed the Patient Summary Reports.       I have reviewed the Medications.     Review of Systems  Anesthesia Hx:    Hx PONV documented in chart; nothing recent. History of prior surgery of interest to airway management or planning:  Previous anesthesia: MAC, Nerve Block, General Hernia repair 5/11/23 with general anesthesia.  Procedure performed at an [facility].     for Hand mass excision 6/10/24.  Procedure performed at an [facility].  Hand mass excision 6/10/24 with MAC.  Procedure performed at an [facility]. Airway issues documented on chart review include GETA, mask, easy, videolaryngoscope used  , view on video-laryngoscopy Grade I     Personal Hx of Anesthesia complications, Post-Operative Nausea/Vomiting, in the past, but not with recent anesthetics / prophylaxis                    Social:  No Alcohol Use, Non-Smoker       Cardiovascular:     Hypertension  Past MI                Per chart documentation, pt was told he had MI per ECG in 2016.  Subsequent cardiac cath was unremarkable.                           Renal/:  Chronic Renal Disease, CKD                Hepatic/GI:        Abn LFTs, hepatomegaly, liver lesion documented 2/26/25.  FibroScan 2/27/25 suggests a large amount of excess fat in the liver with minimal to no scarring.             Musculoskeletal:  Arthritis   Gout  Carpal tunnel syndrome of right wrist       Spine Disorders: lumbar            Neurological:      Headaches                                 Endocrine:    Hyperthyroidism Prediabetes  Hashimoto's thyroiditis      Obesity / BMI > 30  Psych:    depression                 Past Medical History:   Diagnosis Date    Gout     Heart attack     leak in aorta    Hypertension     Hypothyroidism     Thyroid disease      Past Surgical  History:   Procedure Laterality Date    CARDIAC ELECTROPHYSIOLOGY STUDY AND ABLATION  2010    EXCISION OF MASS OF HAND Left 6/10/2024    Procedure: LEFT DORSAL HAND EXCISION, MASS;  Surgeon: Flores Louis MD;  Location: [facility] OR;  Service: Orthopedics;  Laterality: Left;  REGIONAL /MAC    LEG SURGERY Left     ROBOT-ASSISTED LAPAROSCOPIC REPAIR OF VENTRAL HERNIA N/A 05/11/2023    Procedure: ROBOTIC REPAIR, HERNIA, VENTRAL;  Surgeon: José Miguel Espino MD;  Location: [facility] OR;  Service: General;  Laterality: N/A;  make second case     Anesthesia Assessment: Preoperative EQUATION    Planned Procedure: Procedure(s) (LRB):  RIGHT , CARPAL TUNNEL RELEASE (Right)  Requested Anesthesia Type:Local MAC  Surgeon: Flores Louis MD  Service: Orthopedics  Known or anticipated Date of Surgery:8/1/2025    Surgeon notes: reviewed    Electronic QUestionnaire Assessment completed via nurse interview with patient.      Triage considerations:     The patient has no apparent active cardiac condition (No unstable coronary Syndrome such as severe unstable angina or recent [<1 month] myocardial infarction, decompensated CHF, severe valvular   disease or significant arrhythmia)    Previous anesthesia records:GETA, MAC, Nerve block for post-op pain, and Hx PONV    Last PCP note: within 3 months   Subspecialty notes: Hepatology, Ortho, Rheumatology, Urology    Other important co-morbidities: HTN and Obesity   EKG 4/27/23  Vent. Rate : 078 BPM     Atrial Rate : 078 BPM      P-R Int : 152 ms          QRS Dur : 104 ms       QT Int : 396 ms       P-R-T Axes : 032 -68 126 degrees      QTc Int : 451 ms     Normal sinus rhythm   Left anterior fascicular block   Possible Lateral infarct ,age undetermined   Nonspecific T wave abnormality   Abnormal ECG   No previous ECGs available   Confirmed by Tyler Mejia MD ([id number]) on 4/27/2023 6:36:17 PM      Tests already available:  Results have been reviewed.             Instructions given.  "(See in Nurse's note) Pre op medication instructions sent via portal message.    Optimization:  Anesthesia Preop Clinic Assessment not Indicated    Medical Opinion Indicated: no       Sub-specialist consult indicated: no       Plan:  No pre op medical clearance requested.    Navigation:  Straight Line to surgery.               No tests, anesthesia preop clinic visit, or consult required.    Ht: 6'1"  Wt: 115.1 kg (253 lb)  BMI: 33.5  "

## 2025-07-29 ENCOUNTER — ANESTHESIA EVENT (OUTPATIENT)
Dept: SURGERY | Facility: HOSPITAL | Age: 50
End: 2025-07-29
Payer: COMMERCIAL

## 2025-07-31 ENCOUNTER — TELEPHONE (OUTPATIENT)
Dept: ORTHOPEDICS | Facility: CLINIC | Age: 50
End: 2025-07-31
Payer: COMMERCIAL

## 2025-07-31 RX ORDER — TRAMADOL HYDROCHLORIDE 50 MG/1
50 TABLET, FILM COATED ORAL EVERY 6 HOURS PRN
Qty: 10 TABLET | Refills: 0 | Status: SHIPPED | OUTPATIENT
Start: 2025-07-31

## 2025-07-31 RX ORDER — ONDANSETRON 4 MG/1
4 TABLET, ORALLY DISINTEGRATING ORAL EVERY 6 HOURS PRN
Qty: 14 TABLET | Refills: 0 | Status: SHIPPED | OUTPATIENT
Start: 2025-07-31

## 2025-07-31 NOTE — TELEPHONE ENCOUNTER
Spoke c pt. Informed pt of a 5:30 a.m. arrival time for surgery at the [facility]. Confirmed no scratches or open wounds on their hands. Reminded pt of NPO status & PO appt. Pt expressed understanding & was thankful.

## 2025-07-31 NOTE — DISCHARGE INSTRUCTIONS
MD Michele Olivia PA-C, AT-C  Pa[facility] Rodrigues [facility], PA-C    Carpal Tunnel Release  Postoperative Instructions    Wound Care:  After surgery, the wound is covered with sterile dressing(s). Please leave dressings on for 3 days or until you return for 2 week post op follow up visit. If swelling or progressive numbness occurs in the extremity, please call us to arrange change of dressing.   It is normal for the area to bleed and swell after surgery. If blood soaks onto the dressings, please reinforce with additional gauze dressing.    Please contact us immediately if you have any concerns about infection and/or problems with your cast/splint via [facility] messaging or via telephone; please let  know you are a post-operative patient.       [address] [facility] [address]  [phone]  Phone [phone]  Fax [phone]  [email or web address]

## 2025-07-31 NOTE — ANESTHESIA PREPROCEDURE EVALUATION
"                                                                                                             2025  Pre-operative evaluation for Procedure(s) (LRB):  RIGHT , CARPAL TUNNEL RELEASE (Right)    Jose Fox is a 50 y.o. male     Problem List[1]    Review of patient's allergies indicates:  No Known Allergies    Medications Ordered Prior to Encounter[2]    Past Surgical History:   Procedure Laterality Date    CARDIAC ELECTROPHYSIOLOGY STUDY AND ABLATION      EXCISION OF MASS OF HAND Left 6/10/2024    Procedure: LEFT DORSAL HAND EXCISION, MASS;  Surgeon: Flores Louis MD;  Location: [facility] OR;  Service: Orthopedics;  Laterality: Left;  REGIONAL /[facility] [place]    LEG SURGERY Left     ROBOT-ASSISTED LAPAROSCOPIC REPAIR OF VENTRAL HERNIA N/A 2023    Procedure: ROBOTIC REPAIR, HERNIA, VENTRAL;  Surgeon: José Miguel Espino MD;  Location: [facility] OR;  Service: General;  Laterality: N/A;  make second case       Social History[3]      CBC: No results for input(s): "WBC", "RBC", "HGB", "HCT", "PLT", "MCV", "MCH", "MCHC" in the last 72 hours.    CMP: No results for input(s): "NA", "K", "CL", "CO2", "BUN", "CREATININE", "GLU", "MG", "PHOS", "CALCIUM", "ALBUMIN", "PROT", "ALKPHOS", "ALT", "AST", "BILITOT" in the last 72 hours.    INR  No results for input(s): "PT", "INR", "PROTIME", "APTT" in the last 72 hours.        Diagnostic Studies:      EKD Echo:  No results found for this or any previous visit.       Pre-op Assessment    I have reviewed the Patient Summary Reports.     I have reviewed the Nursing Notes. I have reviewed the NPO Status.   I have reviewed the Medications.     Review of Systems  Cardiovascular:     Hypertension  Past MI                                           Neurological:      Headaches                                 Endocrine:   Hypothyroidism          Psych:  Psychiatric History                  Physical Exam  General: Well nourished and " Cooperative    Airway:  Mallampati: II   Mouth Opening: Normal  TM Distance: Normal  Tongue: Normal  Neck ROM: Normal ROM    Chest/Lungs:  Clear to auscultation, Normal Respiratory Rate    Heart:  Rate: Normal  Rhythm: Regular Rhythm  Sounds: Normal        Anesthesia Plan  Type of Anesthesia, risks & benefits discussed:    Anesthesia Type: Gen Natural Airway  Intra-op Monitoring Plan: Standard ASA Monitors  Post Op Pain Control Plan: multimodal analgesia and IV/PO Opioids PRN  Induction:  IV  Airway Plan: Direct and Video, Post-Induction  Informed Consent: Informed consent signed with the Patient and all parties understand the risks and agree with anesthesia plan.  All questions answered.   ASA Score: 2    Ready For Surgery From Anesthesia Perspective.     .           [1]   Patient Active Problem List  Diagnosis    Depression    Gout    Essential hypertension    Periumbilical pain    Ventral hernia without obstruction or gangrene    Gross hematuria    Hashimoto's thyroiditis    Subclinical hyperthyroidism    Prediabetes    Erectile dysfunction    Insomnia    Obesity (BMI 30.0-34.9)    Acute left-sided low back pain with bilateral sciatica    Stiffness of vertebral column    Migraine headache   [2]   No current facility-administered medications on file prior to encounter.     Current Outpatient Medications on File Prior to Encounter   Medication Sig Dispense Refill    [Paused] buPROPion (WELLBUTRIN XL) 300 MG 24 hr tablet Take 1 tablet (300 mg total) by mouth once daily. 90 tablet 3    [Paused] febuxostat (ULORIC) 80 mg Tab Take 1 tablet (80 mg total) by mouth once daily. 90 tablet 0    [Paused] losartan-hydrochlorothiazide 100-25 mg (HYZAAR) 100-25 mg per tablet TAKE 1 TABLET BY MOUTH DAILY 90 tablet 1    [Paused] sumatriptan (IMITREX) 50 MG tablet Take 1 tablet (50 mg total) by mouth every 4 (four) hours as needed for Migraine. Not to exceed 3 tabs per day 12 tablet 1   [3]   Social History  Socioeconomic History     Marital status:     Number of children: 3   Occupational History    Occupation: [employer]      Comment: [employer]   Tobacco Use    Smoking status: Never    Smokeless tobacco: Never   Substance and Sexual Activity    Alcohol use: Never    Drug use: Never    Sexual activity: Yes     Partners: Female     Birth control/protection: None   Social History Narrative    23.18    25      Social Drivers of Health     Financial Resource Strain: Patient Declined (7/10/2024)    Overall Financial Resource Strain (CARDIA)     Difficulty of Paying Living Expenses: Patient declined   Food Insecurity: Patient Declined (7/10/2024)    Hunger Vital Sign     Worried About Running Out of Food in the Last Year: Patient declined     Ran Out of Food in the Last Year: Patient declined   Transportation Needs: No Transportation Needs (11/9/2022)    PRAPARE - Transportation     Lack of Transportation (Medical): No     Lack of Transportation (Non-Medical): No   Physical Activity: Unknown (7/10/2024)    Exercise Vital Sign     Days of Exercise per Week: 4 days   Stress: Patient Declined (7/10/2024)    [employer] [place] [place] [employer] - Occupational Stress Questionnaire     Feeling of Stress : Patient declined   Housing Stability: Unknown (7/10/2024)    Housing Stability Vital Sign     Unable to Pay for Housing in the Last Year: Patient declined

## 2025-08-01 ENCOUNTER — HOSPITAL ENCOUNTER (OUTPATIENT)
Facility: HOSPITAL | Age: 50
Discharge: HOME | End: 2025-08-01
Attending: ORTHOPAEDIC SURGERY | Admitting: ORTHOPAEDIC SURGERY
Payer: COMMERCIAL

## 2025-08-01 ENCOUNTER — ANESTHESIA (OUTPATIENT)
Dept: SURGERY | Facility: HOSPITAL | Age: 50
End: 2025-08-01
Payer: COMMERCIAL

## 2025-08-01 VITALS
BODY MASS INDEX: 33.53 KG/M2 | OXYGEN SATURATION: 97 % | HEIGHT: 73 IN | RESPIRATION RATE: 20 BRPM | TEMPERATURE: 98 F | WEIGHT: 253 LBS | SYSTOLIC BLOOD PRESSURE: 131 MMHG | HEART RATE: 69 BPM | DIASTOLIC BLOOD PRESSURE: 79 MMHG

## 2025-08-01 DIAGNOSIS — G56.01 RIGHT CARPAL TUNNEL SYNDROME: ICD-10-CM

## 2025-08-01 DIAGNOSIS — Z98.890 STATUS POST CARPAL TUNNEL RELEASE: Primary | ICD-10-CM

## 2025-08-01 PROCEDURE — 37000009 HC ANESTHESIA EA ADD 15 MINS: Performed by: ORTHOPAEDIC SURGERY

## 2025-08-01 PROCEDURE — 63600175 PHARM REV CODE 636 W HCPCS: Performed by: NURSE ANESTHETIST, CERTIFIED REGISTERED

## 2025-08-01 PROCEDURE — 25000003 PHARM REV CODE 250: Performed by: ORTHOPAEDIC SURGERY

## 2025-08-01 PROCEDURE — 63600175 PHARM REV CODE 636 W HCPCS: Performed by: ORTHOPAEDIC SURGERY

## 2025-08-01 PROCEDURE — 36000706: Performed by: ORTHOPAEDIC SURGERY

## 2025-08-01 PROCEDURE — 27000221 HC OXYGEN, UP TO 24 HOURS

## 2025-08-01 PROCEDURE — 25000003 PHARM REV CODE 250

## 2025-08-01 PROCEDURE — 99900035 HC TECH TIME PER 15 MIN (STAT)

## 2025-08-01 PROCEDURE — 64721 CARPAL TUNNEL SURGERY: CPT | Mod: RT,,, | Performed by: ORTHOPAEDIC SURGERY

## 2025-08-01 PROCEDURE — 71000015 HC POSTOP RECOV 1ST HR: Performed by: ORTHOPAEDIC SURGERY

## 2025-08-01 PROCEDURE — 71000033 HC RECOVERY, INTIAL HOUR: Performed by: ORTHOPAEDIC SURGERY

## 2025-08-01 PROCEDURE — 25000003 PHARM REV CODE 250: Performed by: PHYSICIAN ASSISTANT

## 2025-08-01 PROCEDURE — 36000707: Performed by: ORTHOPAEDIC SURGERY

## 2025-08-01 PROCEDURE — 94761 N-INVAS EAR/PLS OXIMETRY MLT: CPT

## 2025-08-01 PROCEDURE — 94799 UNLISTED PULMONARY SVC/PX: CPT

## 2025-08-01 PROCEDURE — 37000008 HC ANESTHESIA 1ST 15 MINUTES: Performed by: ORTHOPAEDIC SURGERY

## 2025-08-01 RX ORDER — BUPIVACAINE HYDROCHLORIDE 2.5 MG/ML
INJECTION, SOLUTION EPIDURAL; INFILTRATION; INTRACAUDAL; PERINEURAL
Status: DISCONTINUED | OUTPATIENT
Start: 2025-08-01 | End: 2025-08-01 | Stop reason: HOSPADM

## 2025-08-01 RX ORDER — OXYCODONE HYDROCHLORIDE 5 MG/1
5 TABLET ORAL
Status: DISCONTINUED | OUTPATIENT
Start: 2025-08-01 | End: 2025-08-01 | Stop reason: HOSPADM

## 2025-08-01 RX ORDER — MUPIROCIN 20 MG/G
OINTMENT TOPICAL
Status: DISCONTINUED | OUTPATIENT
Start: 2025-08-01 | End: 2025-08-01 | Stop reason: HOSPADM

## 2025-08-01 RX ORDER — ONDANSETRON HYDROCHLORIDE 2 MG/ML
INJECTION, SOLUTION INTRAVENOUS
Status: DISCONTINUED | OUTPATIENT
Start: 2025-08-01 | End: 2025-08-01

## 2025-08-01 RX ORDER — MIDAZOLAM HYDROCHLORIDE 1 MG/ML
INJECTION INTRAMUSCULAR; INTRAVENOUS
Status: DISCONTINUED | OUTPATIENT
Start: 2025-08-01 | End: 2025-08-01

## 2025-08-01 RX ORDER — SODIUM CHLORIDE 9 MG/ML
INJECTION, SOLUTION INTRAVENOUS CONTINUOUS
Status: DISCONTINUED | OUTPATIENT
Start: 2025-08-01 | End: 2025-08-01 | Stop reason: HOSPADM

## 2025-08-01 RX ORDER — FAMOTIDINE 10 MG/ML
INJECTION, SOLUTION INTRAVENOUS
Status: DISCONTINUED | OUTPATIENT
Start: 2025-08-01 | End: 2025-08-01

## 2025-08-01 RX ORDER — HALOPERIDOL LACTATE 5 MG/ML
0.5 INJECTION, SOLUTION INTRAMUSCULAR EVERY 10 MIN PRN
Status: DISCONTINUED | OUTPATIENT
Start: 2025-08-01 | End: 2025-08-01 | Stop reason: HOSPADM

## 2025-08-01 RX ORDER — CEFAZOLIN 2 G/1
2 INJECTION, POWDER, FOR SOLUTION INTRAMUSCULAR; INTRAVENOUS
Status: DISCONTINUED | OUTPATIENT
Start: 2025-08-01 | End: 2025-08-01 | Stop reason: HOSPADM

## 2025-08-01 RX ORDER — FENTANYL CITRATE 50 UG/ML
INJECTION, SOLUTION INTRAMUSCULAR; INTRAVENOUS
Status: DISCONTINUED | OUTPATIENT
Start: 2025-08-01 | End: 2025-08-01

## 2025-08-01 RX ORDER — GLUCAGON 1 MG
1 KIT INJECTION
Status: DISCONTINUED | OUTPATIENT
Start: 2025-08-01 | End: 2025-08-01 | Stop reason: HOSPADM

## 2025-08-01 RX ORDER — CEFAZOLIN 2 G/1
INJECTION, POWDER, FOR SOLUTION INTRAMUSCULAR; INTRAVENOUS
Status: DISCONTINUED | OUTPATIENT
Start: 2025-08-01 | End: 2025-08-01

## 2025-08-01 RX ORDER — LIDOCAINE HYDROCHLORIDE 10 MG/ML
INJECTION, SOLUTION EPIDURAL; INFILTRATION; INTRACAUDAL; PERINEURAL
Status: DISCONTINUED | OUTPATIENT
Start: 2025-08-01 | End: 2025-08-01 | Stop reason: HOSPADM

## 2025-08-01 RX ORDER — ACETAMINOPHEN 500 MG
1000 TABLET ORAL
Status: COMPLETED | OUTPATIENT
Start: 2025-08-01 | End: 2025-08-01

## 2025-08-01 RX ORDER — IBUPROFEN 200 MG
200 TABLET ORAL EVERY 6 HOURS PRN
COMMUNITY
End: 2025-08-04 | Stop reason: ALTCHOICE

## 2025-08-01 RX ORDER — DEXAMETHASONE SODIUM PHOSPHATE 4 MG/ML
INJECTION, SOLUTION INTRA-ARTICULAR; INTRALESIONAL; INTRAMUSCULAR; INTRAVENOUS; SOFT TISSUE
Status: DISCONTINUED | OUTPATIENT
Start: 2025-08-01 | End: 2025-08-01

## 2025-08-01 RX ORDER — LIDOCAINE HYDROCHLORIDE 20 MG/ML
INJECTION INTRAVENOUS
Status: DISCONTINUED | OUTPATIENT
Start: 2025-08-01 | End: 2025-08-01

## 2025-08-01 RX ORDER — ONDANSETRON HYDROCHLORIDE 2 MG/ML
4 INJECTION, SOLUTION INTRAVENOUS DAILY PRN
Status: DISCONTINUED | OUTPATIENT
Start: 2025-08-01 | End: 2025-08-01 | Stop reason: HOSPADM

## 2025-08-01 RX ORDER — BACITRACIN ZINC 500 UNIT/G
OINTMENT (GRAM) TOPICAL
Status: DISCONTINUED | OUTPATIENT
Start: 2025-08-01 | End: 2025-08-01 | Stop reason: HOSPADM

## 2025-08-01 RX ORDER — PROPOFOL 10 MG/ML
VIAL (ML) INTRAVENOUS CONTINUOUS PRN
Status: DISCONTINUED | OUTPATIENT
Start: 2025-08-01 | End: 2025-08-01

## 2025-08-01 RX ADMIN — CEFAZOLIN 2 G: 2 INJECTION, POWDER, FOR SOLUTION INTRAMUSCULAR; INTRAVENOUS at 07:08

## 2025-08-01 RX ADMIN — LIDOCAINE HYDROCHLORIDE 75 MG: 20 INJECTION INTRAVENOUS at 07:08

## 2025-08-01 RX ADMIN — DEXAMETHASONE SODIUM PHOSPHATE 8 MG: 4 INJECTION, SOLUTION INTRAMUSCULAR; INTRAVENOUS at 07:08

## 2025-08-01 RX ADMIN — FAMOTIDINE 20 MG: 10 INJECTION, SOLUTION INTRAVENOUS at 07:08

## 2025-08-01 RX ADMIN — FENTANYL CITRATE 100 MCG: 50 INJECTION, SOLUTION INTRAMUSCULAR; INTRAVENOUS at 07:08

## 2025-08-01 RX ADMIN — MUPIROCIN: 20 OINTMENT TOPICAL at 05:08

## 2025-08-01 RX ADMIN — SODIUM CHLORIDE: 9 INJECTION, SOLUTION INTRAVENOUS at 07:08

## 2025-08-01 RX ADMIN — PROPOFOL 100 MCG/KG/MIN: 10 INJECTION, EMULSION INTRAVENOUS at 07:08

## 2025-08-01 RX ADMIN — ACETAMINOPHEN 1000 MG: 500 TABLET ORAL at 05:08

## 2025-08-01 RX ADMIN — SODIUM CHLORIDE: 9 INJECTION, SOLUTION INTRAVENOUS at 05:08

## 2025-08-01 RX ADMIN — ONDANSETRON 4 MG: 2 INJECTION INTRAMUSCULAR; INTRAVENOUS at 07:08

## 2025-08-01 RX ADMIN — MIDAZOLAM HYDROCHLORIDE 2 MG: 2 INJECTION, SOLUTION INTRAMUSCULAR; INTRAVENOUS at 06:08

## 2025-08-01 NOTE — PLAN OF CARE
Patient is AAO and VSS.  Tolerating PO and states pain is tolerable.  Dressing CDI.  Patient states they are ready for d/c.  IV removed.  Catheter tip intact.  Friend at bedside.  Discharge instructions reviewed and copy given to the patient and friend.  Questions answered.  Both verbalized understanding.  Awaiting medication to be delivered to bedside.

## 2025-08-01 NOTE — BRIEF OP NOTE
[place] - Surgery ([facility])  Brief Operative Note    Surgery Date: 8/1/2025     Surgeons and Role:     * Flores Louis MD - Primary    Assisting Surgeon: None    Pre-op Diagnosis:  Right hand pain [M79.641]  Carpal tunnel syndrome of right wrist [G56.01]    Post-op Diagnosis:  Post-Op Diagnosis Codes:     * Right hand pain [M79.641]     * Carpal tunnel syndrome of right wrist [G56.01]    Procedure(s) (LRB):  RIGHT , CARPAL TUNNEL RELEASE (Right)    Anesthesia: Local MAC    Operative Findings: see full op note     Estimated Blood Loss: * No values recorded between 8/1/2025  6:50 AM and 8/1/2025  7:40 AM *         Specimens:   Specimen (24h ago, onward)      None            * No specimens in log *        Discharge Note    OUTCOME: Patient tolerated treatment/procedure well without complication and is now ready for discharge.    DISPOSITION: Home or Self Care    FINAL DIAGNOSIS: Right hand pain [M79.641]  Carpal tunnel syndrome of right wrist [G56.01]    FOLLOWUP: In clinic    DISCHARGE INSTRUCTIONS:    Discharge Procedure Orders   Diet general     Call MD for:  temperature >100.4     Call MD for:  persistent nausea and vomiting     Call MD for:  severe uncontrolled pain     Call MD for:  difficulty breathing, headache or visual disturbances     Call MD for:  redness, tenderness, or signs of infection (pain, swelling, redness, odor or green/yellow discharge around incision site)     Call MD for:  hives     Call MD for:  persistent dizziness or light-headedness     Call MD for:  extreme fatigue     No driving, operating heavy equipment or signing legal documents while taking pain medication     Leave dressing on - Keep it clean, dry, and intact until clinic visit

## 2025-08-01 NOTE — H&P
[place] - Surgery ([facility])  History & Physical - Short Stay    Subjective:      Chief Complaint/Reason for Admission: surgery    There are no hospital problems to display for this patient.      History of Present Illness:  Patient is a 50 y.o. male presents with hand numbness. Onset of symptoms was gradual starting several months ago with gradually worsening course since that time. Patient denies . Symptoms are aggravated by . Symptoms improve with.     Pain Scale:      PTA Medications   Medication Sig    buPROPion (WELLBUTRIN XL) 300 MG 24 hr tablet Take 1 tablet (300 mg total) by mouth once daily.    febuxostat (ULORIC) 80 mg Tab Take 1 tablet (80 mg total) by mouth once daily.    ibuprofen (ADVIL,MOTRIN) 200 MG tablet Take 200 mg by mouth every 6 (six) hours as needed for Pain.    losartan-hydrochlorothiazide 100-25 mg (HYZAAR) 100-25 mg per tablet TAKE 1 TABLET BY MOUTH DAILY    diclofenac (VOLTAREN) 75 MG EC tablet Take 1 tablet (75 mg total) by mouth 2 (two) times daily.    sumatriptan (IMITREX) 50 MG tablet Take 1 tablet (50 mg total) by mouth every 4 (four) hours as needed for Migraine. Not to exceed 3 tabs per day       Review of patient's allergies indicates:  No Known Allergies     Past Medical History:   Diagnosis Date    Gout     Heart attack     leak in aorta    Hypertension     Hypothyroidism     Thyroid disease    Patient ID: Jose Fox is a 50 y.o. male.     Chief Complaint: Follow-up and Numbness of the Right Hand     History of Present Illness    CHIEF COMPLAINT:  Right carpal tunnel syndrome     HPI:  Mr. Fox presents for follow-up of right carpal tunnel syndrome. His symptoms in the right hand are constant. Nerve test results show severe motor axon loss and active denervation in the right hand, with no response in sensory testing. He plans to travel to [place] for 2-3 weeks, which affects the timing of the proposed carpal tunnel release surgery.     He denies any formal medical  diagnoses.     SURGICAL HISTORY:  Mr. Fox has a scheduled right carpal tunnel release surgery in July.     WORK STATUS:  Mr. Fox is potentially traveling to [place] for another property.     IMAGING:  Mr. Fox underwent an EMG and nerve conduction study of the right hand/wrist. The results revealed severe carpal tunnel syndrome with motor axon loss, active denervation, and no response in sensory testing.        ROS:  General: denies fever, denies chills, denies fatigue, denies weight gain, denies weight loss  Eyes: denies vision changes, denies redness, denies discharge  ENT: denies ear pain, denies nasal congestion, denies sore throat  Cardiovascular: denies chest pain, denies palpitations, denies lower extremity edema  Respiratory: denies cough, denies shortness of breath  Gastrointestinal: denies abdominal pain, denies nausea, denies vomiting, denies diarrhea, denies constipation, denies blood in stool  Genitourinary: denies dysuria, denies hematuria, denies frequency  Musculoskeletal: denies joint pain, denies muscle pain  Skin: denies rash, denies lesion  Neurological: denies headache, denies dizziness, reports numbness, denies tingling  Psychiatric: denies anxiety, denies depression, denies sleep difficulty          Physical Exam          See last exam     Assessment & Plan     Right carpal tunnel release: 10-minute outpatient procedure under sedation with local anesthesia.   Post-op symptoms can include numbness in fingers for a few hours post-surgery.   Nerve regeneration may take 12-18 months to see final results.   Schedule surgery with Loren after the patient returns from [place] trip.        Patient has constant numbness did state that this may or may not return after the surgery based on the severity of the nerve test results went over the nerve test results in detail explained to him what the EMG findings as well as the no response findings on the nerve conduction study        No  follow-ups on file.     This note was generated with the assistance of ambient listening technology. Verbal consent was obtained by the patient and accompanying visitor(s) for the recording of patient appointment to facilitate this note. I attest to having reviewed and edited the generated note for accuracy, though some syntax or spelling errors may persist. Please contact the author of this note for any clarification.

## 2025-08-01 NOTE — ANESTHESIA POSTPROCEDURE EVALUATION
Anesthesia Post Evaluation    Patient: Jose Fox    Procedure(s) Performed: Procedure(s) (LRB):  RIGHT , CARPAL TUNNEL RELEASE (Right)    Final Anesthesia Type: general      Patient location during evaluation: PACU  Patient participation: Yes- Able to Participate  Level of consciousness: awake and alert  Post-procedure vital signs: reviewed and stable  Pain management: adequate  Airway patency: patent  LIZETTE mitigation strategies: Multimodal analgesia  PONV status at discharge: No PONV  Anesthetic complications: no      Cardiovascular status: blood pressure returned to baseline and hemodynamically stable  Respiratory status: unassisted  Hydration status: euvolemic  Follow-up not needed.              Vitals Value Taken Time   /79 08/01/25 08:02   Temp 36.7 °C (98 °F) 08/01/25 07:42   Pulse 69 08/01/25 08:08   Resp 26 08/01/25 08:06   SpO2 96 % 08/01/25 08:06   Vitals shown include unfiled device data.      Event Time   Out of Recovery 08:09:00         Pain/Rafael Score: Pain Rating Prior to Med Admin: 0 (8/1/2025  5:55 AM)  Rafael Score: 10 (8/1/2025  8:00 AM)

## 2025-08-01 NOTE — PLAN OF CARE
Pre-op complete. Waiting on site janie, full H&P, and anesthesia consent. Patient requested to leave friend in waiting room. Bed locked in lowest position with call bell within reach.

## 2025-08-01 NOTE — TRANSFER OF CARE
"Anesthesia Transfer of Care Note    Patient: Jose Fox    Procedure(s) Performed: Procedure(s) (LRB):  RIGHT , CARPAL TUNNEL RELEASE (Right)    Patient location: PACU    Anesthesia Type: general    Transport from OR: Transported from OR on 6-10 L/min O2 by face mask with adequate spontaneous ventilation    Post pain: adequate analgesia    Post assessment: no apparent anesthetic complications and tolerated procedure well    Post vital signs: stable    Level of consciousness: awake, alert and oriented    Nausea/Vomiting: no nausea/vomiting    Complications: none    Transfer of care protocol was followed      Last vitals: Visit Vitals  BP (!) 150/87 (BP Location: Left arm, Patient Position: Lying)   Pulse 74   Temp 36.8 °C (98.2 °F) (Oral)   Resp 18   Ht 6' 1" (1.854 m)   Wt 114.8 kg (253 lb)   SpO2 98%   BMI 33.38 kg/m²     "

## 2025-08-04 ENCOUNTER — PATIENT MESSAGE (OUTPATIENT)
Dept: PRIMARY CARE CLINIC | Facility: CLINIC | Age: 50
End: 2025-08-04
Payer: COMMERCIAL

## 2025-08-04 ENCOUNTER — PATIENT MESSAGE (OUTPATIENT)
Dept: ORTHOPEDICS | Facility: CLINIC | Age: 50
End: 2025-08-04
Payer: COMMERCIAL

## 2025-08-04 ENCOUNTER — OFFICE VISIT (OUTPATIENT)
Dept: PODIATRY | Facility: CLINIC | Age: 50
End: 2025-08-04
Payer: COMMERCIAL

## 2025-08-04 VITALS
BODY MASS INDEX: 34.07 KG/M2 | HEART RATE: 73 BPM | HEIGHT: 73 IN | WEIGHT: 257.06 LBS | DIASTOLIC BLOOD PRESSURE: 94 MMHG | SYSTOLIC BLOOD PRESSURE: 146 MMHG

## 2025-08-04 DIAGNOSIS — R60.9 SWELLING: Primary | ICD-10-CM

## 2025-08-04 DIAGNOSIS — G56.01 CARPAL TUNNEL SYNDROME OF RIGHT WRIST: ICD-10-CM

## 2025-08-04 DIAGNOSIS — M79.672 BILATERAL FOOT PAIN: ICD-10-CM

## 2025-08-04 DIAGNOSIS — Z98.890 POST-OPERATIVE STATE: Primary | ICD-10-CM

## 2025-08-04 DIAGNOSIS — M79.671 BILATERAL FOOT PAIN: ICD-10-CM

## 2025-08-04 PROCEDURE — 3077F SYST BP >= 140 MM HG: CPT | Mod: CPTII,S$GLB,, | Performed by: PODIATRIST

## 2025-08-04 PROCEDURE — 3080F DIAST BP >= 90 MM HG: CPT | Mod: CPTII,S$GLB,, | Performed by: PODIATRIST

## 2025-08-04 PROCEDURE — 3008F BODY MASS INDEX DOCD: CPT | Mod: CPTII,S$GLB,, | Performed by: PODIATRIST

## 2025-08-04 PROCEDURE — 99203 OFFICE O/P NEW LOW 30 MIN: CPT | Mod: S$GLB,,, | Performed by: PODIATRIST

## 2025-08-04 PROCEDURE — 1159F MED LIST DOCD IN RCRD: CPT | Mod: CPTII,S$GLB,, | Performed by: PODIATRIST

## 2025-08-04 PROCEDURE — 99999 PR PBB SHADOW E&M-EST. PATIENT-LVL III: CPT | Mod: PBBFAC,,, | Performed by: PODIATRIST

## 2025-08-04 RX ORDER — IBUPROFEN 800 MG/1
800 TABLET, FILM COATED ORAL 3 TIMES DAILY
Qty: 90 TABLET | Refills: 0 | Status: SHIPPED | OUTPATIENT
Start: 2025-08-04

## 2025-08-04 NOTE — TELEPHONE ENCOUNTER
Spoke with patient over the phone. Will order 800 mg Ibuprofen to supplement with 500 mg Tylenol three times a day.

## 2025-08-04 NOTE — OP NOTE
[facility] Surgery ([facility])  Surgery Department  Operative Note    SUMMARY     Date of Procedure: 8/1/2025     Procedure: Procedure(s) (LRB):  RIGHT , CARPAL TUNNEL RELEASE (Right)     Surgeons and Role:     * Flores Louis MD - Primary    Assisting Surgeon: None    Pre-Operative Diagnosis: Right hand pain [M79.641]  Carpal tunnel syndrome of right wrist [G56.01]    Post-Operative Diagnosis: Post-Op Diagnosis Codes:     * Right hand pain [M79.641]     * Carpal tunnel syndrome of right wrist [G56.01]    Anesthesia: Local MAC    Technical Procedures Used: surgery    Description of the Findings of the Procedure: COMPLICATIONS: None.   INDICATIONS FOR PROCEDURE: Mr Quiroz is a 50-year-old male who had numbness   and tingling into her right hand. He has failed conservative treatment, EMG   was positive for carpal tunnel syndrome. Therefore, operative intervention was   deemed necessary. Risks and benefits were explained to the patient in clinic   since performed in clinic.   PROCEDURE IN DETAIL: After correct site was marked with the patient's   participation in the holding area, the patient was brought to the Operating   Room, placed in supine position, underwent MAC anesthesia. A well-padded   nonsterile tourniquet was placed on the right arm. A time-out was called for   correct site, procedure and patient to be indicated. Under sterile conditions,   an injection of lidocaine 1% was injected into the carpal tunnel space. The arm   was prepped and draped in normal sterile fashion. The incision was marked out   using Domínguez cardinal lines. The arm was exsanguinated using Esmarch.   Tourniquet was insufflated to 250 mmHg and that is where it remained for a total   of 10 minutes. The incision was made down to the palmar fascia. The palmar   fascia was sharply incised. The transverse ligament was identified. It was   very thick in nature. It was sharply incised. Median nerve was identified. A   Mosquito hemostat  was passed from the undersurface of the transverse ligament   proximally and distally to free it from the median nerve. Metzenbaum scissors   were utilized to cut the transverse ligament proximally and distally. Once that   was completely released, a Mosquito hemostat was passed again to confirm a   complete release. Once that was performed, the area was irrigated with copious   amounts of normal saline. Nylon closed the skin. Sterile dressing was applied.   Tourniquet was deflated. Brisk capillary refill ensued. The patient was   transported to the Recovery Room in stable condition.   POSTOPERATIVE PLAN FOR THIS PATIENT:  He is to keep his dressing clean, dry and   intact. We will see him back in 2 weeks for stitch removal.   Patient had severe compression complete with discoloration of the nerve swelling of the nerve    Significant Surgical Tasks Conducted by the Assistant(s), if Applicable: retraction    Complications: No    Estimated Blood Loss (EBL): * No values recorded between 8/1/2025  6:50 AM and 8/1/2025  7:42 AM *           Implants: * No implants in log *    Specimens:   Specimen (24h ago, onward)      None                    Condition: Good    Disposition: PACU - hemodynamically stable.    Attestation: I performed the procedure.    Discharge Note    SUMMARY     Admit Date: 8/1/2025    Discharge Date and Time: 8/1/2025  8:48 AM    Hospital Course (synopsis of major diagnoses, care, treatment, and services provided during the course of the hospital stay): surgery     Final Diagnosis: Post-Op Diagnosis Codes:     * Right hand pain [M79.641]     * Carpal tunnel syndrome of right wrist [G56.01]    Disposition: Home or Self Care    Follow Up/Patient Instructions:     Medications:  Reconciled Home Medications:      Medication List        START taking these medications      ondansetron 4 MG Tbdl  Commonly known as: ZOFRAN-ODT  Dissolve 1 tablet (4 mg total) by mouth every 6 (six) hours as needed (For Nausea).      traMADoL 50 mg tablet  Commonly known as: ULTRAM  Take 1 tablet (50 mg total) by mouth every 6 (six) hours as needed for Pain.            CONTINUE taking these medications      buPROPion 300 MG 24 hr tablet  Commonly known as: WELLBUTRIN XL  Take 1 tablet (300 mg total) by mouth once daily.     diclofenac 75 MG EC tablet  Commonly known as: VOLTAREN  Take 1 tablet (75 mg total) by mouth 2 (two) times daily.     febuxostat 80 mg Tab  Commonly known as: ULORIC  Take 1 tablet (80 mg total) by mouth once daily.     losartan-hydrochlorothiazide 100-25 mg 100-25 mg per tablet  Commonly known as: HYZAAR  TAKE 1 TABLET BY MOUTH DAILY     sumatriptan 50 MG tablet  Commonly known as: IMITREX  Take 1 tablet (50 mg total) by mouth every 4 (four) hours as needed for Migraine. Not to exceed 3 tabs per day            ASK your doctor about these medications      ibuprofen 200 MG tablet  Commonly known as: ADVIL,MOTRIN  Take 200 mg by mouth every 6 (six) hours as needed for Pain.            Discharge Procedure Orders   Diet general     Call MD for:  temperature >100.4     Call MD for:  persistent nausea and vomiting     Call MD for:  severe uncontrolled pain     Call MD for:  difficulty breathing, headache or visual disturbances     Call MD for:  redness, tenderness, or signs of infection (pain, swelling, redness, odor or green/yellow discharge around incision site)     Call MD for:  hives     Call MD for:  persistent dizziness or light-headedness     Call MD for:  extreme fatigue     No driving, operating heavy equipment or signing legal documents while taking pain medication     Leave dressing on - Keep it clean, dry, and intact until clinic visit

## 2025-08-04 NOTE — PROGRESS NOTES
Subjective:      Patient ID: Jose Fox is a 50 y.o. male.    Chief Complaint: Foot Pain (Bilateral)    Jose is a 50 y.o. male who presents to the podiatry clinic  with complaint of  bilateral foot pain. Onset of the symptoms was several months ago. Precipitating event: none known. Current symptoms include: ability to bear weight, but with some pain. Aggravating factors: walking and any activity and certain shoes. Symptoms have progressed to a point and plateaued. Patient has had no prior foot problems. Evaluation to date: none. Treatment to date: none. Patients rates pain 5/10 on pain scale.    Review of Systems   Constitutional: Negative for chills, fever and malaise/fatigue.   HENT:  Negative for hearing loss.    Cardiovascular:  Positive for leg swelling. Negative for claudication.   Respiratory:  Negative for shortness of breath.    Skin:  Negative for flushing and rash.   Musculoskeletal:  Negative for joint pain and myalgias.   Neurological:  Negative for loss of balance, numbness, paresthesias and sensory change.   Psychiatric/Behavioral:  Negative for altered mental status.    Allergic/Immunologic: Negative for hives.           Objective:      Physical Exam  Vitals reviewed.   Cardiovascular:      Pulses:           Dorsalis pedis pulses are 2+ on the right side and 2+ on the left side.        Posterior tibial pulses are 2+ on the right side and 2+ on the left side.      Comments: Varicosities noted b/L    Musculoskeletal:         General: Swelling present. Normal range of motion.      Right lower leg: Edema present.      Left lower leg: Edema present.      Comments:        Feet:      Right foot:      Protective Sensation: 5 sites tested.  5 sites sensed.      Left foot:      Protective Sensation: 5 sites tested.  5 sites sensed.   Skin:     General: Skin is warm.      Capillary Refill: Capillary refill takes 2 to 3 seconds.      Comments: Normal skin tugor noted.   No open lesion noted b/L  Skin temp  "is warm to warm from proximal to distal b/L.  Webspaces clean, dry, and intact     Neurological:      Mental Status: He is alert and oriented to person, place, and time.      Comments: Gross sensation intact b/L       Decreased height of medial arches noted with loading of the foot b/L  Pronation noted when foot is loaded          Assessment:       Encounter Diagnoses   Name Primary?    Bilateral foot pain     Swelling Yes         Plan:       Jose Vigil" was seen today for foot pain.    Diagnoses and all orders for this visit:    Swelling  -     ORTHOTIC DEVICE (DME)  -     COMPRESSION STOCKINGS    Bilateral foot pain  -     Ambulatory referral/consult to Podiatry  -     ORTHOTIC DEVICE (DME)  -     COMPRESSION STOCKINGS      I counseled the patient on his conditions, their implications and medical management.        Pt advised that the pain in his feet is likely caused by flat foot type.   Pt also advised pain could be due from excessive standing/walking  Rx compression for swelling  Rx custom orthotics   Shoe modification advised for the pt. Pt was advised to obtain shoes will accommodate foot deformities.   Call or return to clinic prn if these symptoms worsen or fail to improve as anticipated.            "

## 2025-08-15 ENCOUNTER — OFFICE VISIT (OUTPATIENT)
Dept: ORTHOPEDICS | Facility: CLINIC | Age: 50
End: 2025-08-15
Payer: COMMERCIAL

## 2025-08-15 VITALS
HEART RATE: 66 BPM | HEIGHT: 73 IN | RESPIRATION RATE: 16 BRPM | WEIGHT: 257.13 LBS | SYSTOLIC BLOOD PRESSURE: 150 MMHG | TEMPERATURE: 99 F | DIASTOLIC BLOOD PRESSURE: 80 MMHG | BODY MASS INDEX: 34.08 KG/M2 | OXYGEN SATURATION: 97 %

## 2025-08-15 DIAGNOSIS — Z98.890 STATUS POST CARPAL TUNNEL RELEASE: Primary | ICD-10-CM

## 2025-08-15 PROCEDURE — 99999 PR PBB SHADOW E&M-EST. PATIENT-LVL V: CPT | Mod: PBBFAC,,, | Performed by: PHYSICIAN ASSISTANT

## 2025-08-17 RX ORDER — LOSARTAN POTASSIUM AND HYDROCHLOROTHIAZIDE 25; 100 MG/1; MG/1
1 TABLET ORAL DAILY
Qty: 90 TABLET | Refills: 0 | Status: SHIPPED | OUTPATIENT
Start: 2025-08-17

## 2025-09-05 ENCOUNTER — PATIENT MESSAGE (OUTPATIENT)
Dept: PRIMARY CARE CLINIC | Facility: CLINIC | Age: 50
End: 2025-09-05
Payer: COMMERCIAL

## (undated) DEVICE — TOWEL OR DISP STRL BLUE 4/PK

## (undated) DEVICE — TOURNIQUET SB QC DP 18X4IN

## (undated) DEVICE — ADHESIVE DERMABOND ADVANCED

## (undated) DEVICE — NDL 22GA X1 1/2 REG BEVEL

## (undated) DEVICE — COVER CAMERA OPERATING ROOM

## (undated) DEVICE — SUT V-LOC 90 UD GS22 2-0 9IN

## (undated) DEVICE — SCRUB 10% POVIDONE IODINE 4OZ

## (undated) DEVICE — SPONGE COTTON TRAY 4X4IN

## (undated) DEVICE — GLOVE BIOGEL ECLIPSE SZ 7

## (undated) DEVICE — DRAPE STERI-DRAPE 1000 17X11IN

## (undated) DEVICE — ELECTRODE REM PLYHSV RETURN 9

## (undated) DEVICE — SYR B-D DISP CONTROL 10CC100/C

## (undated) DEVICE — SUT STRATAFIX 0 PDS+ 23CM 9IN

## (undated) DEVICE — SOL POVIDONE SCRUB IODINE 4 OZ

## (undated) DEVICE — CORD FOR BIPOLAR FORCEPS 12

## (undated) DEVICE — NDL HYPO STD REG BVL 18GX1.5IN

## (undated) DEVICE — KIT WING PAD POSITIONING

## (undated) DEVICE — COVER TIP CURVED SCISSORS XI

## (undated) DEVICE — PAD PINK TRENDELENBURG POS XL

## (undated) DEVICE — SEAL UNIVERSAL 5MM-8MM XI

## (undated) DEVICE — GLOVE BIOGEL PI MICRO INDIC 7

## (undated) DEVICE — Device

## (undated) DEVICE — GLOVE ULTRATOUCH PLYSPHRN 7.5

## (undated) DEVICE — DRESSING N ADH OIL EMUL 3X3

## (undated) DEVICE — CONTAINER SPECIMEN OR STER 4OZ

## (undated) DEVICE — BANDAGE BULKEE II 2.25INX3YD

## (undated) DEVICE — GOWN ECLIPSE REINF LVL4 TWL XL

## (undated) DEVICE — SYR 10CC LUER LOCK

## (undated) DEVICE — DRAPE ARM DAVINCI XI

## (undated) DEVICE — NDL MONOJECT BLACK 22GA 1.5IN

## (undated) DEVICE — SLING ARM LARGE FOAM STRAP

## (undated) DEVICE — SOL IRR SOD CHL .9% POUR

## (undated) DEVICE — OBTURATOR BLADELESS 8MM XI CLR

## (undated) DEVICE — GLOVE SURGICAL LATEX SZ 7

## (undated) DEVICE — BANDAGE GAUZE 6PLY FLUFF 2X3

## (undated) DEVICE — BLADE SURG #15 CARBON STEEL

## (undated) DEVICE — BANDAGE MATRIX HK LOOP 2IN 5YD

## (undated) DEVICE — SUT MCRYL PLUS 4-0 PS2 27IN

## (undated) DEVICE — COVER LIGHT HANDLE 80/CA

## (undated) DEVICE — SUT 4/0 18IN ETHILON BL P3

## (undated) DEVICE — COVER MAYO STND XL 30X57IN

## (undated) DEVICE — FORCEP STRAIGHT DISP